# Patient Record
Sex: FEMALE | Race: WHITE | NOT HISPANIC OR LATINO | Employment: STUDENT | URBAN - METROPOLITAN AREA
[De-identification: names, ages, dates, MRNs, and addresses within clinical notes are randomized per-mention and may not be internally consistent; named-entity substitution may affect disease eponyms.]

---
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
## 2019-01-04 ENCOUNTER — OFFICE VISIT (OUTPATIENT)
Dept: OPHTHALMOLOGY | Facility: CLINIC | Age: 19
End: 2019-01-04
Attending: OPHTHALMOLOGY
Payer: COMMERCIAL

## 2019-01-04 DIAGNOSIS — Z96.1 PSEUDOPHAKIA, RIGHT EYE: ICD-10-CM

## 2019-01-04 DIAGNOSIS — M08.40 JIA (JUVENILE IDIOPATHIC ARTHRITIS), OLIGOARTHRITIS, PERSISTENT (H): ICD-10-CM

## 2019-01-04 DIAGNOSIS — H20.9 UVEITIS: ICD-10-CM

## 2019-01-04 DIAGNOSIS — H20.11 CHRONIC ANTERIOR UVEITIS OF RIGHT EYE: Primary | ICD-10-CM

## 2019-01-04 PROCEDURE — G0463 HOSPITAL OUTPT CLINIC VISIT: HCPCS | Mod: ZF

## 2019-01-04 RX ORDER — FOLIC ACID 1 MG/1
1 TABLET ORAL DAILY
Qty: 30 TABLET | Refills: 11 | Status: SHIPPED | OUTPATIENT
Start: 2019-01-04 | End: 2020-01-06

## 2019-01-04 RX ORDER — PREDNISOLONE ACETATE 10 MG/ML
1 SUSPENSION/ DROPS OPHTHALMIC 2 TIMES DAILY
Qty: 5 ML | Refills: 8 | Status: SHIPPED | OUTPATIENT
Start: 2019-01-04 | End: 2020-06-19

## 2019-01-04 ASSESSMENT — TONOMETRY
OD_IOP_MMHG: 22
IOP_METHOD: TONOPEN
OS_IOP_MMHG: 23

## 2019-01-04 ASSESSMENT — EXTERNAL EXAM - RIGHT EYE: OD_EXAM: NORMAL

## 2019-01-04 ASSESSMENT — CUP TO DISC RATIO
OD_RATIO: 0.5
OS_RATIO: 0.5

## 2019-01-04 ASSESSMENT — VISUAL ACUITY
OS_SC+: -1
OD_SC+: -1
METHOD: SNELLEN - LINEAR
OS_SC: 20/25
OD_SC: 20/20

## 2019-01-04 ASSESSMENT — CONF VISUAL FIELD
OS_NORMAL: 1
OD_NORMAL: 1

## 2019-01-04 ASSESSMENT — SLIT LAMP EXAM - LIDS
COMMENTS: NORMAL
COMMENTS: NORMAL

## 2019-01-04 ASSESSMENT — EXTERNAL EXAM - LEFT EYE: OS_EXAM: NORMAL

## 2019-01-04 NOTE — PROGRESS NOTES
HPI: Colette Marcos is a 18 year old female with a history of OMERO-associated anterior uveitis right eye here for follow up. No changes or eye concerns since last visit. No eye redness or pain. She has chronic floaters and has not noticed a change in the floaters. She has been unable to establish eye care in [place] because of difficulty with insurance coverage.    Current medications: prednisolone acetate twice a day right eye, oral methotrexate 15 mg (6 tab)/wk Saturday or Sunday (has been on and off methotrexate since age 8-9 years), folic acid 1 mg/day, Humira q2wk (started 6.7.18). Also takes meloxicam as needed for joints (not really taking).  IMT prescribed by rheum: Dr. Es Arnold at [facility]    Prior medications have included various oral NSAIDs for joints and a short course of oral prednisone for joints (patient did not tolerate well, felt sick per mother's report, patient does not recall because it was so long ago). For ease of use she switched to Humira from Remicade 700 mg (~9.5 mg/kg) q4wk (Fall 2013 - 5.7.18).    Ocular history:   1. 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 Joint disease (RF-negative, polyarticular) first diagnosed at age 5 years (though mother reports that she may have been symptomatic as early as age 2 years with ankle pain and swelling). Follows with Dr. Arnold ([facility] rheum) for joint disease and immunomodulatory therapy. Uveitis first diagnosed at age 8-9 years. Patient and mother report no history of uveitis left eye. Review of electronic records here (through 01/2014) shows no cell left eye.    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   Most recently had 0.5+ anterior chamber cell right eye in August 2017 (low trace, ~1 cell/hpf, was holding MTX at time of exam) and definite 0.5+ anterior chamber cell right eye in August 2016 (2-6 cells/hpf).  2. History of cystoid macular edema right eye, s/p intravitreal  corticosteroid right eye in September 2013 (Dr. Fofana).  3. Pseudophakia right eye s/p cataract extraction/IOL 12.09.14 ([facility]), s/p YAG capsulotomy 04.24.17.    Ocular Imaging:  Macular OCT 1.13.17: within normal limits both eyes    Impression/Plan:  1. OMERO-associated anterior uveitis right eye. Quiet today. Since most recent activity was in August 2016 (2-6 AC cells/hpf), tapering one of her medications is reasonable. Colette, Colette's mother, and I had an extensive discussion about the increased risk of Colette's uveitis flaring with a medication taper. Especially since Colette's uveitis is asymptomatic, it is critical that she have regular ophthalmology follow up during a medication taper.   - Continue methotrexate, will start tapering (12.5 mg/week for 1 month then 10 mg/week until next eye clinic visit)   - Continue Humira at q2wk   - Continue Predforte right eye twice a day   - Follow up in 2 months, over spring break. Will plan for future follow up at that visit, either here at [facility] or with an ophthalmologist near school (has had difficulty establishing care because of insurance coverage)    2. Pseudophakia right eye, stable.   - Monitor    Return to clinic in 2 months (over spring break) V/T. If there is no uveitis activity, plan to complete methotrexate taper (7.5 mg/week for 1 month then stop) with follow up in another 2 months - though may need to delay taper depending on scheduling.    Attending Physician Attestation:  Complete documentation of historical and exam elements from today's encounter can be found in the full encounter summary report (not reduplicated in this progress note).  I personally obtained the chief complaint(s) and history of present illness.  I confirmed and edited as necessary the review of systems, past medical/surgical history, family history, social history, and examination findings as documented by others; and I examined the patient myself.  I personally reviewed the relevant tests,  images, and reports as documented above.  I formulated and edited as necessary the assessment and plan and discussed the findings and management plan with the patient and family.  - Yaima Maynard M.D.

## 2019-01-04 NOTE — Clinical Note
Colette's uveitis was quiescent, so I think it is reasonable to try tapering the methotrexate. Please let me know if you do not agree with the proposed taper.Thank you, Yaima

## 2019-01-04 NOTE — PATIENT INSTRUCTIONS
Decrease methotrexate to 5 pills per week for 1 month then 4 pills per week until next eye clinic visit  Continue Predforte twice a day right eye  Continue Humira every 2 weeks

## 2019-03-14 ENCOUNTER — OFFICE VISIT (OUTPATIENT)
Dept: RHEUMATOLOGY | Facility: CLINIC | Age: 19
End: 2019-03-14
Attending: INTERNAL MEDICINE
Payer: COMMERCIAL

## 2019-03-14 VITALS
HEART RATE: 68 BPM | WEIGHT: 165.12 LBS | SYSTOLIC BLOOD PRESSURE: 130 MMHG | BODY MASS INDEX: 28.19 KG/M2 | TEMPERATURE: 98 F | HEIGHT: 64 IN | DIASTOLIC BLOOD PRESSURE: 82 MMHG

## 2019-03-14 DIAGNOSIS — H20.041 SECONDARY IRIDOCYCLITIS, NONINFECTIOUS, RIGHT: ICD-10-CM

## 2019-03-14 DIAGNOSIS — M08.40 JIA (JUVENILE IDIOPATHIC ARTHRITIS), OLIGOARTHRITIS, PERSISTENT (H): Primary | ICD-10-CM

## 2019-03-14 PROCEDURE — G0463 HOSPITAL OUTPT CLINIC VISIT: HCPCS | Mod: ZF

## 2019-03-14 ASSESSMENT — MIFFLIN-ST. JEOR: SCORE: 1513

## 2019-03-14 ASSESSMENT — PAIN SCALES - GENERAL: PAINLEVEL: NO PAIN (0)

## 2019-03-14 NOTE — PATIENT INSTRUCTIONS
[facility]    For Help:  The Pediatric Call Center at [phone] can help with scheduling of routine follow up visits.  Phil Baeza is the  for the Division of Pediatric Rheumatology and is available Monday through Friday from 7:00am to 3:30pm.  Please call Phil at [phone] to:    Schedule joint injections     Coordinate your follow up visits with other specialties or procedure for the same day    Request a call back from a nurse or your child s doctor    Request refills or lab and x-ray orders    Forward medical records    Schedule or cancel infusions (please give us 72 hours so other patients can benefit from this opening). Please try to schedule infusions 3 months in advance. Note: Insurance authorization must be obtained before any infusion can be scheduled. If you change health insurance, you must notify our office as soon as possible, so that the infusion can be reauthorized.  Amelia Don and Samantha Wynn are the Nurse Coordinators for the Division of Pediatric Rheumatology and can be reached directly at [phone]. They can help with questions about your child s rheumatic condition, medications, and test results.   For emergencies after hours or on the weekends, please call the page  at [phone] and ask to speak to the physician on-call for Pediatric Rheumatology. Please do not use [employer] for urgent requests.  Main  Services:  [phone]  o Hmong/[place]/Citizen of [place]: [phone]  o Senegalese: [phone]  [place]: [phone]

## 2019-03-14 NOTE — LETTER
3/14/2019    RE: Coletet Marcos  [address]  [facility] [address]         Rheumatology History:     Date of symptom onset:  4/1/2002  Date of first visit to center:  8/1/2005  Date of OMERO diagnosis:  8/1/2005  ILAR category:  polyarticular (RF-negative)  . 8/18/2017   WILLIAN Status Negative     . 8/18/2017   Rheumatoid Factor Status Negative         Ophthalmology History:     . 12/29/2018   (COIN) Iritis/Uveitis comorbidity? Yes     . 12/29/2018   (COIN) Active Uveitis? No     Uveitis chronicity:     Date of last eye exam: 1/4/1919  In compliance with eye screening (y/n):             Medications:   As of completion of this visit:  Current Outpatient Medications   Medication Sig Dispense Refill     adalimumab (HUMIRA PEN) 40 MG/0.8ML pen kit Inject 0.8 mLs (40 mg) Subcutaneous every 14 days 2 kit 3     folic acid (FOLVITE) 1 MG tablet Take 1 tablet (1 mg) by mouth daily 30 tablet 11     methotrexate 2.5 MG tablet Take 6 tablets (15 mg) by mouth once a week (Patient taking differently: Take 15 mg by mouth once a week Patient now taking 4 tablets=10mg once a week) 24 tablet 6     prednisoLONE acetate (PRED FORTE) 1 % ophthalmic suspension Place 1 drop into the right eye 2 times daily 5 mL 8     skin refrigerant (GEBAUERS PAIN EASE) AERO Use as directed prior to Humira injeciton. 103.5 mL 3     Prescribed medications have been administered regularly, without missed doses, and the medications have been tolerated well, without side effects.         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 Arnold at [facility]    The rheumatologist she will see while in college is:   Dr. Kelly Barney   Rheumatology   [facility]   [address]   [place] [address]   Phone: [phone]             Subjective:     Colette is a 18 year old female who was seen in Pediatric Rheumatology clinic today for follow up. Colette is accompanied today by her mom.  The primary encounter diagnosis was OMERO (juvenile idiopathic arthritis), oligoarthritis, persistent (H). A diagnosis of Secondary iridocyclitis, noninfectious, right was also pertinent to this visit. At the last visit 3 months ago, she was doing well thus we planned to taper methotrexate if Dr. Maynard agreed. She saw Dr. Maynard two months ago who agreed with tapering the methotrexate. Colette is now down to 10 mg weekly.    Since that time she has been doing well. She has not had joint pain or stiffness. She has her next eye exam tomorrow and Colette states that the plan was to stop methotrexate if the uveitis remains quiet. They are establishing care with a ophthalmologist in [place]. 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 She will be back for a short time in August and maybe May. School and swimming are going well. 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     She sprained her wrist while doing a crab walk in [place]. It is getting better.     A 14-point review of systems was negative.          Examination:   Blood pressure 130/82, pulse 68, temperature 98  F (36.7  C), temperature source Oral, height 1.632 m (5' 4.25\"), weight 74.9 kg (165 lb 2 oz), not currently breastfeeding.  90 %ile based on CDC (Girls, 2-20 Years) " weight-for-age data based on Weight recorded on 3/14/2019.  Blood pressure percentiles are not available for patients who are 18 years or older.  Gen: Pleasant, well-appearing, NAD  HEENT/Neck: TM's clear bilaterally, oropharynx is clear without lesions, neck is supple with no lymphadenopathy                  CV: Regular rate and rhythm, normal S1, S2, no murmurs  Resp: Clear to ascultation bilaterally  Abd: Soft, non-tender, non-distended, no hepatosplenomegaly  Skin: Clear, there is no rash  MSK: All joints were examined including TMJ, sternoclavicular, acromioclavicular, neck, shoulder, elbow, wrist, hips, knees, ankles, fingers, and toes, and all were normal except as follows:   JA Exam Details:  (COIN) Sacroiliac tenderness:: No  (COIN) Positive VELMA test:: No  (COIN) Modified Schober's Test:: No        Entheses  (COIN) Tender Entheses count: 0  Positive VELMA test:  No  Modified Schober s (yes/no, cm):  No    Total active joints:  0  Total limited joints:  0  Tender entheses count:  0       Imaging/ Lab Results:     None          Assessment:     Colette is a 18 year old female with 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 Colette is treated with oral methotrexate and Humira. We are mainly managing her uveitis. Two months ago her methtorexate was weaned down to 10 mg. She has a follow-up eye appointment tomorrow. If her uveitis remains quiet the plan is to stop her methotrexate. Colette, her mom, and me are all in agreement that if she is able to get off the methotrexate, we are in no hurry to get off the Humira any time soon.       Change Since Last Visit: Same  ACR Functional Class: Normal  (COIN) Provider Global Assessment Of Disease Activity: 0  (This is measured on the scale of 0 - 10)                  Plan:     1. Labs will be obtained in 1-2 months locally only if she does not stop methotrexate. She will find out tomorrow if she can stop methotrexate.  No need for labs if she gets of the methotrexate. We only do annual labs if she is on Humira only.   2. She has an eye exam tomorrow with Dr. Maynard.   3. Continue Humira.   4. Continue routine eye exams as per problem list above.   5. Return in about 5 months (around 8/14/2019). Call sooner with any concerns.     If there are any new questions or concerns, I would be glad to help and can be reached through our main office at [phone] or our paging  at [phone].    sE Arnold MD  Pediatric Rheumatology  [facility]  Patient Care Team:  Yaima Lopez MD as PCP - General (Family Practice)  Jaydon Fofana MD as MD (Ophthalmology)  Roula Matthews PT as Physical Therapist (Physical Therapy)  Es Arnold MD as MD (Pediatric Rheumatology)  Félix Anna MD (Ophthalmology)  Yaima Lopez MD as Assigned PCP  Jenny Laguerre MD as Referring Physician (Pediatrics)  Yaima Maynard MD as MD (Ophthalmology)  YAIMA LOPEZ    Copy to patient  Trisha Marcos Peter  [address] [facility] [address]

## 2019-03-14 NOTE — NURSING NOTE
"Chief Complaint   Patient presents with     Arthritis     OMERO       /82 (BP Location: Left arm, Patient Position: Sitting, Cuff Size: Adult Regular)   Pulse 68   Temp 98  F (36.7  C) (Oral)   Ht 5' 4.25\" (163.2 cm)   Wt 165 lb 2 oz (74.9 kg)   BMI 28.12 kg/m      Sheryl Guerrero CMA  March 14, 2019  "

## 2019-03-14 NOTE — PROGRESS NOTES
Rheumatology History:     Date of symptom onset:  4/1/2002  Date of first visit to center:  8/1/2005  Date of OMERO diagnosis:  8/1/2005  ILAR category:  polyarticular (RF-negative)  . 8/18/2017   WILLIAN Status Negative     . 8/18/2017   Rheumatoid Factor Status Negative         Ophthalmology History:     . 12/29/2018   (COIN) Iritis/Uveitis comorbidity? Yes     . 12/29/2018   (COIN) Active Uveitis? No     Uveitis chronicity:     Date of last eye exam: 1/4/1919  In compliance with eye screening (y/n):             Medications:   As of completion of this visit:  Current Outpatient Medications   Medication Sig Dispense Refill     adalimumab (HUMIRA PEN) 40 MG/0.8ML pen kit Inject 0.8 mLs (40 mg) Subcutaneous every 14 days 2 kit 3     folic acid (FOLVITE) 1 MG tablet Take 1 tablet (1 mg) by mouth daily 30 tablet 11     methotrexate 2.5 MG tablet Take 6 tablets (15 mg) by mouth once a week (Patient taking differently: Take 15 mg by mouth once a week Patient now taking 4 tablets=10mg once a week) 24 tablet 6     prednisoLONE acetate (PRED FORTE) 1 % ophthalmic suspension Place 1 drop into the right eye 2 times daily 5 mL 8     skin refrigerant (GEBAUERS PAIN EASE) AERO Use as directed prior to Humira injeciton. 103.5 mL 3     Prescribed medications have been administered regularly, without missed doses, and the medications have been tolerated well, without side effects.         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 Arnold at [facility]    The rheumatologist she will see while in college is:   Dr. Kelly Barney   Rheumatology   [facility]   [address]   [place] [address]   Phone: [phone]             Subjective:     Colette is a 18 year old female who was seen in Pediatric Rheumatology clinic today for follow up. Colette is accompanied today by her mom.  The primary encounter diagnosis was OMERO (juvenile idiopathic arthritis), oligoarthritis, persistent (H). A diagnosis of Secondary iridocyclitis, noninfectious, right was also pertinent to this visit. At the last visit 3 months ago, she was doing well thus we planned to taper methotrexate if Dr. Maynard agreed. She saw Dr. Maynard two months ago who agreed with tapering the methotrexate. Colette is now down to 10 mg weekly.    Since that time she has been doing well. She has not had joint pain or stiffness. She has her next eye exam tomorrow and Colette states that the plan was to stop methotrexate if the uveitis remains quiet. They are establishing care with a ophthalmologist in [place]. 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 She will be back for a short time in August and maybe May. School and swimming are going well. 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     She sprained her wrist while doing a crab walk in [place]. It is getting better.     A 14-point review of systems was negative.          Examination:   Blood pressure 130/82, pulse 68, temperature 98  F (36.7  C), temperature source Oral, height 1.632 m (5' 4.25\"), weight 74.9 kg (165 lb 2 oz), not currently breastfeeding.  90 %ile based on CDC (Girls, 2-20 Years) weight-for-age data based on Weight recorded on 3/14/2019.  Blood pressure percentiles are not " available for patients who are 18 years or older.  Gen: Pleasant, well-appearing, NAD  HEENT/Neck: TM's clear bilaterally, oropharynx is clear without lesions, neck is supple with no lymphadenopathy                  CV: Regular rate and rhythm, normal S1, S2, no murmurs  Resp: Clear to ascultation bilaterally  Abd: Soft, non-tender, non-distended, no hepatosplenomegaly  Skin: Clear, there is no rash  MSK: All joints were examined including TMJ, sternoclavicular, acromioclavicular, neck, shoulder, elbow, wrist, hips, knees, ankles, fingers, and toes, and all were normal except as follows:   JA Exam Details:  (COIN) Sacroiliac tenderness:: No  (COIN) Positive VELMA test:: No  (COIN) Modified Schober's Test:: No        Entheses  (COIN) Tender Entheses count: 0  Positive VELMA test:  No  Modified Schober s (yes/no, cm):  No    Total active joints:  0  Total limited joints:  0  Tender entheses count:  0       Imaging/ Lab Results:     None          Assessment:     Colette is a 18 year old female with 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 Colette is treated with oral methotrexate and Humira. We are mainly managing her uveitis. Two months ago her methtorexate was weaned down to 10 mg. She has a follow-up eye appointment tomorrow. If her uveitis remains quiet the plan is to stop her methotrexate. Colette, her mom, and me are all in agreement that if she is able to get off the methotrexate, we are in no hurry to get off the Humira any time soon.       Change Since Last Visit: Same  ACR Functional Class: Normal  (COIN) Provider Global Assessment Of Disease Activity: 0  (This is measured on the scale of 0 - 10)                  Plan:     1. Labs will be obtained in 1-2 months locally only if she does not stop methotrexate. She will find out tomorrow if she can stop methotrexate. No need for labs if she gets of the methotrexate. We only do annual labs if she is on Humira  only.   2. She has an eye exam tomorrow with Dr. Maynard.   3. Continue Humira.   4. Continue routine eye exams as per problem list above.   5. Return in about 5 months (around 8/14/2019). Call sooner with any concerns.     If there are any new questions or concerns, I would be glad to help and can be reached through our main office at [phone] or our paging  at [phone].    Es Arnold MD  Pediatric Rheumatology  [facility]  Patient Care Team:  Yaima Lopez MD as PCP - General (Family Practice)  Jaydon Fofana MD as MD (Ophthalmology)  Roula Matthews PT as Physical Therapist (Physical Therapy)  Es Arnold MD as MD (Pediatric Rheumatology)  Félix Anna MD (Ophthalmology)  Yaima Lopez MD as Assigned PCP  Jenny Laguerre MD as Referring Physician (Pediatrics)  Yaima Maynard MD as MD (Ophthalmology)  YAIMA LOPEZ    Copy to patient  Trisha Marcos Peter  [address] [facility] [address]

## 2019-03-15 ENCOUNTER — OFFICE VISIT (OUTPATIENT)
Dept: OPHTHALMOLOGY | Facility: CLINIC | Age: 19
End: 2019-03-15
Attending: OPHTHALMOLOGY
Payer: COMMERCIAL

## 2019-03-15 DIAGNOSIS — Z96.1 PSEUDOPHAKIA, RIGHT EYE: ICD-10-CM

## 2019-03-15 DIAGNOSIS — H20.11 CHRONIC ANTERIOR UVEITIS OF RIGHT EYE: Primary | ICD-10-CM

## 2019-03-15 PROCEDURE — G0463 HOSPITAL OUTPT CLINIC VISIT: HCPCS | Mod: ZF

## 2019-03-15 ASSESSMENT — TONOMETRY
OS_IOP_MMHG: 21
IOP_METHOD: TONOPEN
OD_IOP_MMHG: 21
OS_IOP_MMHG: 20
IOP_METHOD: ICARE
OD_IOP_MMHG: 24

## 2019-03-15 ASSESSMENT — VISUAL ACUITY
OD_SC: 20/25
METHOD: SNELLEN - LINEAR
OS_SC: 20/20

## 2019-03-15 ASSESSMENT — SLIT LAMP EXAM - LIDS
COMMENTS: NORMAL
COMMENTS: NORMAL

## 2019-03-15 ASSESSMENT — EXTERNAL EXAM - LEFT EYE: OS_EXAM: NORMAL

## 2019-03-15 ASSESSMENT — CUP TO DISC RATIO
OD_RATIO: 0.5
OS_RATIO: 0.5

## 2019-03-15 ASSESSMENT — EXTERNAL EXAM - RIGHT EYE: OD_EXAM: NORMAL

## 2019-03-15 NOTE — NURSING NOTE
Patient presents for 2mo f/u of Chronic anterior uveitis of right eye (Primary Dx); Uveitis. Since the last visit the vision has been stable. No pain or discomfort, no redness itching or tears. No f/f. Consistent w/PF eye drops. H/o Secondary iridocyclitis, noninfectious, right, Pseudophakia of right eye & Posterior capsular opacification visually significant, right. Jasmin Fowler COT 11:46 AM March 15, 2019

## 2019-03-15 NOTE — PROGRESS NOTES
HPI: Colette Marcos is a 18 year old female with a history of OMERO-associated anterior uveitis right eye here for follow up. No changes or eye concerns since last visit. No eye redness or pain. She has chronic floaters and has not noticed a change in the floaters. Since last visit, Colette has slowly tapered methotrexate as directed (was at 15 mg/wk, then 12.5 mg/wk Jan 2019, then 10 mg/wk since Feb 2019). Denies any change in symptoms with methotrexate taper. Also still on pred forte 2x/day right eye.    Current medications: prednisolone acetate twice a day right eye, oral methotrexate 10 mg (4 tab)/wk Saturday or Sunday (has been on and off methotrexate since age 8-9 years, previously on 15 mg/wk with taper starting early Jan 2019), folic acid 1 mg/day, Humira q2wk (started 6.7.18). Also takes meloxicam as needed for joints (not really taking).  IMT prescribed by rheum: Dr. Es Arnold at [facility]    Prior medications have included various oral NSAIDs for joints and a short course of oral prednisone for joints (patient did not tolerate well, felt sick per mother's report, patient does not recall because it was so long ago). For ease of use she switched to Humira from Remicade 700 mg (~9.5 mg/kg) q4wk (Fall 2013 - 5.7.18).    Ocular history:   1. 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 Joint disease (RF-negative, polyarticular) first diagnosed at age 5 years (though mother reports that she may have been symptomatic as early as age 2 years with ankle pain and swelling). Follows with Dr. Arnold ([facility] rheum) for joint disease and immunomodulatory therapy. Uveitis first diagnosed at age 8-9 years. Patient and mother report no history of uveitis left eye. Review of electronic records here (through 01/2014) shows no cell left eye.    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   Most recently had 0.5+ anterior chamber cell right eye in August 2017 (low trace,  ~1 cell/hpf, was holding MTX at time of exam) and definite 0.5+ anterior chamber cell right eye in August 2016 (2-6 cells/hpf).  2. History of cystoid macular edema right eye, s/p intravitreal corticosteroid right eye in September 2013 (Dr. Fofana).  3. Pseudophakia right eye s/p cataract extraction/IOL 12.09.14 ([facility]), s/p YAG capsulotomy 04.24.17.    Ocular Imaging:  Macular OCT 1.13.17: within normal limits both eyes    Impression/Plan:  1. OMERO-associated anterior uveitis right eye. There is new low grade 0.5+ AC cell right eye today with tapering of methotrexate.   - Continue methotrexate at 10 mg/week until next eye clinic visit. I do not recommend further taper at this time   - Continue Humira at q2wk   - Continue Predforte right eye twice a day   - Follow up in 2 months, over spring break. Will plan for future follow up at that visit, either here at [facility] or with an ophthalmologist near school (has had difficulty establishing care because of insurance coverage)    2. Pseudophakia right eye, stable.   - Monitor    Return to clinic in 2 months (over spring break) V/T. If there is no uveitis activity, plan to complete methotrexate taper (7.5 mg/week for 1 month then stop) with follow up in another 2 months - though may need to delay taper depending on scheduling.    Attending Physician Attestation:  Complete documentation of historical and exam elements from today's encounter can be found in the full encounter summary report (not reduplicated in this progress note).  I personally obtained the chief complaint(s) and history of present illness.  I confirmed and edited as necessary the review of systems, past medical/surgical history, family history, social history, and examination findings as documented by others; and I examined the patient myself.  I personally reviewed the relevant tests, images, and reports as documented above.  I formulated and edited as necessary the assessment and plan and discussed the  findings and management plan with the patient and family.  - Yaima Maynard M.D.

## 2019-03-15 NOTE — Clinical Note
I recommend continuing methotrexate for now at 10 mg/wk since there was just a hint of anterior uveitis right eye today (low grade 0.5+ AC cell), with the plan to reevaluate in May.Thanks, Yaima

## 2019-05-15 ENCOUNTER — TELEPHONE (OUTPATIENT)
Dept: RHEUMATOLOGY | Facility: CLINIC | Age: 19
End: 2019-05-15

## 2019-05-15 NOTE — TELEPHONE ENCOUNTER
Prior Authorization Approval    Authorization Effective Date: 4/14/2019  Authorization Expiration Date: 5/13/2021  Medication: Humira- Approved  Approved Dose/Quantity: 40mg/ 2  Reference #:     Insurance Company: [employer] - Phone [phone] Fax [phone]  Expected CoPay:       CoPay Card Available:      Foundation Assistance Needed:    Which Pharmacy is filling the prescription (Not needed for infusion/clinic administered): [facility] - [place], [place]  [facility] [address]  Pharmacy Notified: Yes  Patient Notified: Yes

## 2019-05-24 ENCOUNTER — OFFICE VISIT (OUTPATIENT)
Dept: OPHTHALMOLOGY | Facility: CLINIC | Age: 19
End: 2019-05-24
Attending: OPHTHALMOLOGY
Payer: COMMERCIAL

## 2019-05-24 DIAGNOSIS — Z96.1 PSEUDOPHAKIA, RIGHT EYE: ICD-10-CM

## 2019-05-24 DIAGNOSIS — Z79.899 MEDICATION MANAGEMENT: ICD-10-CM

## 2019-05-24 DIAGNOSIS — H20.11 CHRONIC ANTERIOR UVEITIS OF RIGHT EYE: Primary | ICD-10-CM

## 2019-05-24 PROCEDURE — G0463 HOSPITAL OUTPT CLINIC VISIT: HCPCS | Mod: ZF

## 2019-05-24 ASSESSMENT — VISUAL ACUITY
OD_PH_SC: 20/30
METHOD: SNELLEN - LINEAR
OD_PH_SC+: -1
OD_SC+: -2
OS_PH_SC+: -1
OS_SC: 20/30
OD_SC: 20/40
OS_PH_SC: 20/20

## 2019-05-24 ASSESSMENT — CONF VISUAL FIELD
METHOD: COUNTING FINGERS
OS_NORMAL: 1
OD_NORMAL: 1

## 2019-05-24 ASSESSMENT — TONOMETRY
OS_IOP_MMHG: 18
IOP_METHOD: APPLANATION
OS_IOP_MMHG: 18
OD_IOP_MMHG: 21
OD_IOP_MMHG: 24
IOP_METHOD: ICARE

## 2019-05-24 ASSESSMENT — EXTERNAL EXAM - LEFT EYE: OS_EXAM: NORMAL

## 2019-05-24 ASSESSMENT — SLIT LAMP EXAM - LIDS
COMMENTS: NORMAL
COMMENTS: NORMAL

## 2019-05-24 ASSESSMENT — EXTERNAL EXAM - RIGHT EYE: OD_EXAM: NORMAL

## 2019-05-24 ASSESSMENT — CUP TO DISC RATIO
OD_RATIO: 0.5
OS_RATIO: 0.5

## 2019-05-24 NOTE — Clinical Note
Colette's eyes were doing well, so I think she can try stopping the methotrexate and seeing me within a month of stopping. 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 Does this plan seem ok to you? Thanks, Yaima

## 2019-05-24 NOTE — PROGRESS NOTES
HPI: Colette Marcos is a 19 year old female with a history of OMERO-associated anterior uveitis right eye here for follow up. No changes or eye concerns since last visit. No eye redness or pain. She has chronic floaters and has not noticed a change in the floaters.    Current medications: prednisolone acetate twice a day right eye, oral methotrexate 10 mg (4 tab)/wk Saturday or Sunday (has been on and off methotrexate since age 8-9 years, previously on 15 mg/wk with taper starting early Jan 2019), folic acid 1 mg/day, Humira q2wk (started 6.7.18). Also takes meloxicam as needed for joints (not really taking).  IMT prescribed by rheum: Dr. Es Arnold at [facility]    Prior medications have included various oral NSAIDs for joints and a short course of oral prednisone for joints (patient did not tolerate well, felt sick per mother's report, patient does not recall because it was so long ago). For ease of use she switched to Humira from Remicade 700 mg (~9.5 mg/kg) q4wk (Fall 2013 - 5.7.18).    Ocular history:   1. 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 Joint disease (RF-negative, polyarticular) first diagnosed at age 5 years (though mother reports that she may have been symptomatic as early as age 2 years with ankle pain and swelling). Follows with Dr. Arnold ([facility] rheum) for joint disease and immunomodulatory therapy. Uveitis first diagnosed at age 8-9 years. Patient and mother report no history of uveitis left eye. Review of electronic records here (through 01/2014) shows no cell left eye.    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   Most recently had 0.5+ anterior chamber cell right eye in August 2017 (low trace, ~1 cell/hpf, was holding MTX at time of exam) and definite 0.5+ anterior chamber cell right eye in August 2016 (2-6 cells/hpf).  2. History of cystoid macular edema right eye, s/p intravitreal corticosteroid right eye in September 2013 (  [facility]).  3. Pseudophakia right eye s/p cataract extraction/IOL 12.09.14 ([facility]), s/p YAG capsulotomy 04.24.17.    Ocular Imaging:  Macular OCT 1.13.17: within normal limits both eyes    Impression/Plan:  1. OMERO-associated anterior uveitis right eye. There is rare AC cell right eye (not gradable level and improved from last visit), so will have Colette proceed with trial off methotrexate; she should be seen within a month of stopping methotrexate so will plan for her to stop in July.   - Continue methotrexate at 10 mg/week for now, then stop 1 month prior to next eye clinic visit   - Discussed increased risk of uveitis flare with stopping methotrexate   - Continue Humira at q2wk - Colette is due for safety labs, so ordered today - she will have these done at a [place] closer to her home   - Continue Predforte right eye twice a day   - Follow up in 3 months, V/T    2. Pseudophakia right eye, stable.   - Monitor    Attending Physician Attestation:  Complete documentation of historical and exam elements from today's encounter can be found in the full encounter summary report (not reduplicated in this progress note).  I personally obtained the chief complaint(s) and history of present illness.  I confirmed and edited as necessary the review of systems, past medical/surgical history, family history, social history, and examination findings as documented by others; and I examined the patient myself.  I personally reviewed the relevant tests, images, and reports as documented above.  I formulated and edited as necessary the assessment and plan and discussed the findings and management plan with the patient and family.  - Yaima Maynard M.D.

## 2019-05-24 NOTE — NURSING NOTE
Chief Complaint(s) and History of Present Illness(es)     Follow Up     In right eye.  Associated symptoms include Negative for dryness, eye pain, redness and tearing.              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 Pt notes no changes in vision each eye x 2 months.     SOLITARIO Lui 3:01 PM May 24, 2019

## 2019-05-29 DIAGNOSIS — H20.11 CHRONIC ANTERIOR UVEITIS OF RIGHT EYE: ICD-10-CM

## 2019-05-29 DIAGNOSIS — Z79.899 MEDICATION MANAGEMENT: ICD-10-CM

## 2019-05-29 LAB
ALBUMIN UR-MCNC: NEGATIVE MG/DL
APPEARANCE UR: CLEAR
BASOPHILS # BLD AUTO: 0 10E9/L (ref 0–0.2)
BASOPHILS NFR BLD AUTO: 0.4 %
BILIRUB UR QL STRIP: NEGATIVE
COLOR UR AUTO: YELLOW
CRP SERPL-MCNC: <2.9 MG/L (ref 0–8)
DIFFERENTIAL METHOD BLD: NORMAL
EOSINOPHIL # BLD AUTO: 0.4 10E9/L (ref 0–0.7)
EOSINOPHIL NFR BLD AUTO: 3.9 %
ERYTHROCYTE [DISTWIDTH] IN BLOOD BY AUTOMATED COUNT: 13 % (ref 10–15)
ERYTHROCYTE [SEDIMENTATION RATE] IN BLOOD BY WESTERGREN METHOD: 9 MM/H (ref 0–20)
GLUCOSE UR STRIP-MCNC: NEGATIVE MG/DL
HCT VFR BLD AUTO: 39.6 % (ref 35–47)
HGB BLD-MCNC: 13.3 G/DL (ref 11.7–15.7)
HGB UR QL STRIP: NEGATIVE
KETONES UR STRIP-MCNC: ABNORMAL MG/DL
LEUKOCYTE ESTERASE UR QL STRIP: NEGATIVE
LYMPHOCYTES # BLD AUTO: 2.1 10E9/L (ref 0.8–5.3)
LYMPHOCYTES NFR BLD AUTO: 22.3 %
MCH RBC QN AUTO: 30.5 PG (ref 26.5–33)
MCHC RBC AUTO-ENTMCNC: 33.6 G/DL (ref 31.5–36.5)
MCV RBC AUTO: 91 FL (ref 78–100)
MONOCYTES # BLD AUTO: 0.8 10E9/L (ref 0–1.3)
MONOCYTES NFR BLD AUTO: 8.5 %
NEUTROPHILS # BLD AUTO: 6 10E9/L (ref 1.6–8.3)
NEUTROPHILS NFR BLD AUTO: 64.9 %
NITRATE UR QL: NEGATIVE
PH UR STRIP: 7 PH (ref 5–7)
PLATELET # BLD AUTO: 294 10E9/L (ref 150–450)
RBC # BLD AUTO: 4.36 10E12/L (ref 3.8–5.2)
RBC #/AREA URNS AUTO: ABNORMAL /HPF
SOURCE: ABNORMAL
SP GR UR STRIP: 1.02 (ref 1–1.03)
UROBILINOGEN UR STRIP-ACNC: 0.2 EU/DL (ref 0.2–1)
WBC # BLD AUTO: 9.2 10E9/L (ref 4–11)
WBC #/AREA URNS AUTO: ABNORMAL /HPF

## 2019-05-29 PROCEDURE — 36415 COLL VENOUS BLD VENIPUNCTURE: CPT | Performed by: OPHTHALMOLOGY

## 2019-05-29 PROCEDURE — 82565 ASSAY OF CREATININE: CPT | Performed by: OPHTHALMOLOGY

## 2019-05-29 PROCEDURE — 86140 C-REACTIVE PROTEIN: CPT | Performed by: OPHTHALMOLOGY

## 2019-05-29 PROCEDURE — 85652 RBC SED RATE AUTOMATED: CPT | Performed by: OPHTHALMOLOGY

## 2019-05-29 PROCEDURE — 80076 HEPATIC FUNCTION PANEL: CPT | Performed by: OPHTHALMOLOGY

## 2019-05-29 PROCEDURE — 85025 COMPLETE CBC W/AUTO DIFF WBC: CPT | Performed by: OPHTHALMOLOGY

## 2019-05-29 PROCEDURE — 81001 URINALYSIS AUTO W/SCOPE: CPT | Performed by: OPHTHALMOLOGY

## 2019-05-30 LAB
ALBUMIN SERPL-MCNC: 3.9 G/DL (ref 3.4–5)
ALP SERPL-CCNC: 61 U/L (ref 40–150)
ALT SERPL W P-5'-P-CCNC: 29 U/L (ref 0–50)
AST SERPL W P-5'-P-CCNC: 23 U/L (ref 0–45)
BILIRUB DIRECT SERPL-MCNC: <0.1 MG/DL (ref 0–0.2)
BILIRUB SERPL-MCNC: 0.2 MG/DL (ref 0.2–1.3)
CREAT SERPL-MCNC: 0.78 MG/DL (ref 0.52–1.04)
GFR SERPL CREATININE-BSD FRML MDRD: >90 ML/MIN/{1.73_M2}
PROT SERPL-MCNC: 7.6 G/DL (ref 6.8–8.8)

## 2019-06-11 ENCOUNTER — TELEPHONE (OUTPATIENT)
Dept: RHEUMATOLOGY | Facility: CLINIC | Age: 19
End: 2019-06-11

## 2019-06-11 DIAGNOSIS — H20.9 UVEITIS: ICD-10-CM

## 2019-06-11 DIAGNOSIS — M08.40 JIA (JUVENILE IDIOPATHIC ARTHRITIS), OLIGOARTHRITIS, PERSISTENT (H): ICD-10-CM

## 2019-08-16 ENCOUNTER — OFFICE VISIT (OUTPATIENT)
Dept: OPHTHALMOLOGY | Facility: CLINIC | Age: 19
End: 2019-08-16
Attending: OPHTHALMOLOGY
Payer: COMMERCIAL

## 2019-08-16 DIAGNOSIS — H20.11 CHRONIC ANTERIOR UVEITIS OF RIGHT EYE: Primary | ICD-10-CM

## 2019-08-16 DIAGNOSIS — Z96.1 PSEUDOPHAKIA, RIGHT EYE: ICD-10-CM

## 2019-08-16 PROCEDURE — G0463 HOSPITAL OUTPT CLINIC VISIT: HCPCS | Mod: ZF

## 2019-08-16 ASSESSMENT — CONF VISUAL FIELD
OS_NORMAL: 1
OD_NORMAL: 1
METHOD: COUNTING FINGERS

## 2019-08-16 ASSESSMENT — TONOMETRY
OS_IOP_MMHG: 22
IOP_METHOD: APPLANATION
OD_IOP_MMHG: 22

## 2019-08-16 ASSESSMENT — SLIT LAMP EXAM - LIDS
COMMENTS: NORMAL
COMMENTS: NORMAL

## 2019-08-16 ASSESSMENT — VISUAL ACUITY
OD_PH_SC+: -2
OS_SC+: -2
OS_SC: 20/30
CORRECTION_TYPE: GLASSES
OD_SC: 20/40
OD_PH_SC: 20/25
METHOD: SNELLEN - LINEAR
OS_PH_SC: 20/20

## 2019-08-16 ASSESSMENT — CUP TO DISC RATIO
OD_RATIO: 0.5
OS_RATIO: 0.5

## 2019-08-16 ASSESSMENT — EXTERNAL EXAM - LEFT EYE: OS_EXAM: NORMAL

## 2019-08-16 ASSESSMENT — EXTERNAL EXAM - RIGHT EYE: OD_EXAM: NORMAL

## 2019-08-16 NOTE — NURSING NOTE
Chief Complaints and History of Present Illnesses   Patient presents with     Uveitis Follow-Up     Chief Complaint(s) and History of Present Illness(es)     Uveitis Follow-Up     Laterality: right eye    Course: stable    Associated symptoms: dryness.  Negative for eye pain, redness and tearing    Pain scale: 0/10              Comments     Chronic anterior uveitis of RE.  Pt denies pain or VA changes.  Prednisolone BID to RE.  Denies use of art tears.  Kathya Almonte COT 1:18 PM 08/16/2019

## 2019-08-16 NOTE — PROGRESS NOTES
HPI: Colette Marcos is a 19 year old female with a history of OMERO-associated anterior uveitis right eye here for follow up. No changes or eye concerns since last visit. No eye redness or pain. She has chronic floaters and has not noticed a change in the floaters.    Current medications: prednisolone acetate twice a day right eye, Humira q2wk (started 6.7.18). Also takes meloxicam as needed for joints (not really taking).  IMT prescribed by rheum: Dr. Es Arnold at [facility]    Prior medications have included various oral NSAIDs for joints and a short course of oral prednisone for joints (patient did not tolerate well, felt sick per mother's report, patient does not recall because it was so long ago). For ease of use she switched to Humira from Remicade 700 mg (~9.5 mg/kg) q4wk (Fall 2013 - 5.7.18), oral methotrexate 10 mg (was on and off methotrexate since age 8-9 years, previously on 15 mg/wk with taper starting early Jan 2019 - stopped July 20, 2019).    Ocular history:   1. 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 Joint disease (RF-negative, polyarticular) first diagnosed at age 5 years (though mother reports that she may have been symptomatic as early as age 2 years with ankle pain and swelling). Follows with Dr. Arnold ([facility] rheum) for joint disease and immunomodulatory therapy. Uveitis first diagnosed at age 8-9 years. Patient and mother report no history of uveitis left eye. Review of electronic records here (through 01/2014) shows no cell left eye.    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   Most recently had 0.5+ anterior chamber cell right eye in August 2017 (low trace, ~1 cell/hpf, was holding MTX at time of exam) and definite 0.5+ anterior chamber cell right eye in August 2016 (2-6 cells/hpf).  2. History of cystoid macular edema right eye, s/p intravitreal corticosteroid right eye in September 2013 (Dr. Fofana).  3. Pseudophakia  right eye s/p cataract extraction/IOL 12.09.14 ([facility]), s/p YAG capsulotomy 04.24.17.    Ocular Imaging:  Macular OCT 1.13.17: within normal limits both eyes    Impression/Plan:  1. OMERO-associated anterior uveitis right eye. There is rare AC cell right eye (not gradable level and stable from last visit), so will have Colette continue off methotrexate.   - Continue Humira at q2wk   - Continue Predforte right eye twice a day, may increase if needed for additional uveitis control   - Follow up in 2 months near [facility] and in 4 months here, V/T    2. Pseudophakia right eye, stable.   - Monitor    Attending Physician Attestation:  Complete documentation of historical and exam elements from today's encounter can be found in the full encounter summary report (not reduplicated in this progress note).  I personally obtained the chief complaint(s) and history of present illness.  I confirmed and edited as necessary the review of systems, past medical/surgical history, family history, social history, and examination findings as documented by others; and I examined the patient myself.  I personally reviewed the relevant tests, images, and reports as documented above.  I formulated and edited as necessary the assessment and plan and discussed the findings and management plan with the patient and family.  - Yaima Maynard M.D.

## 2019-08-22 ENCOUNTER — OFFICE VISIT (OUTPATIENT)
Dept: RHEUMATOLOGY | Facility: CLINIC | Age: 19
End: 2019-08-22
Attending: INTERNAL MEDICINE
Payer: COMMERCIAL

## 2019-08-22 VITALS
WEIGHT: 171.3 LBS | HEART RATE: 60 BPM | DIASTOLIC BLOOD PRESSURE: 73 MMHG | TEMPERATURE: 98 F | BODY MASS INDEX: 29.24 KG/M2 | HEIGHT: 64 IN | SYSTOLIC BLOOD PRESSURE: 119 MMHG

## 2019-08-22 DIAGNOSIS — H20.041 SECONDARY IRIDOCYCLITIS, NONINFECTIOUS, RIGHT: ICD-10-CM

## 2019-08-22 DIAGNOSIS — M08.40 JIA (JUVENILE IDIOPATHIC ARTHRITIS), OLIGOARTHRITIS, PERSISTENT (H): Primary | ICD-10-CM

## 2019-08-22 PROCEDURE — G0463 HOSPITAL OUTPT CLINIC VISIT: HCPCS | Mod: ZF

## 2019-08-22 ASSESSMENT — MIFFLIN-ST. JEOR: SCORE: 1536

## 2019-08-22 ASSESSMENT — PAIN SCALES - GENERAL: PAINLEVEL: NO PAIN (0)

## 2019-08-22 NOTE — PROGRESS NOTES
Rheumatology History:     Date of symptom onset:  4/1/2002  Date of first visit to center:  8/1/2005  Date of OMERO diagnosis:  8/1/2005  ILAR category:  polyarticular (RF-negative)  . 8/18/2017   WILLIAN Status Negative     . 8/18/2017   Rheumatoid Factor Status Negative         Ophthalmology History:     . 12/29/2018   (COIN) Iritis/Uveitis comorbidity? Yes     . 12/29/2018   (COIN) Active Uveitis? No     Date of last eye exam: 1/4/1919  In compliance with eye screening (y/n):             Medications:   As of completion of this visit:  Current Outpatient Medications   Medication Sig Dispense Refill     adalimumab (HUMIRA PEN) 40 MG/0.8ML pen kit Inject 0.8 mLs (40 mg) Subcutaneous every 14 days 2 kit 3     folic acid (FOLVITE) 1 MG tablet Take 1 tablet (1 mg) by mouth daily 30 tablet 11     prednisoLONE acetate (PRED FORTE) 1 % ophthalmic suspension Place 1 drop into the right eye 2 times daily 5 mL 8     skin refrigerant (GEBAUERS PAIN EASE) AERO Use as directed prior to Humira injeciton. 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 Arnold at [facility]    The rheumatologist she will see while in college is:   Dr. Kelly Barney   Rheumatology   [facility]   [address]   [place],  "[place] [address]   Phone: [phone]             Subjective:     Colette is a 19 year old female who was seen in Pediatric Rheumatology clinic today for follow up. Colette is unaccompanied today.  The primary encounter diagnosis was OMERO (juvenile idiopathic arthritis), oligoarthritis, persistent (H). A diagnosis of Secondary iridocyclitis, noninfectious, right was also pertinent to this visit. At the last visit 5 months ago, she was doing well thus we planned to stop the methotrexate if her upcoming eye exam was stable. Since that time she has been doing well. She's been off methotrexate for one month. She will see ophthalmology in October near school then she will see Dr. Maynard again in December.     A 14-point review of systems was negative.     Information per our standardized questionnaire is as below:   Self Report  (COIN) Patient Pain Status: 0  (COIN) Patient Global Assessment Of Disease Activity: 0  Score Reported By: Self  Arthritis History  (COIN) Morning stiffness in the past week: no stiffness  Has your arthritis stopped from trying any athletic or rigorous activities, or interfaced with your ability to do these activities: No  Have you been limited your ability to do normal daily activities in the past week: No  Did you needed help from other people to do normal activities in the past week: No  Have you used any aids or devices to help you do normal daily activities in the past week: No            Examination:   Blood pressure 119/73, pulse 60, temperature 98  F (36.7  C), temperature source Oral, height 1.632 m (5' 4.25\"), weight 77.7 kg (171 lb 4.8 oz), not currently breastfeeding.  Normalized weight-for-age data not available for patients older than 20 years.  Blood pressure percentiles are not available for patients who are 18 years or older.  Gen: Pleasant, well-appearing, NAD  HEENT/Neck: TM's clear bilaterally, oropharynx is clear without lesions, neck is supple with no lymphadenopathy                "   CV: Regular rate and rhythm, normal S1, S2, no murmurs  Resp: Clear to ascultation bilaterally  Abd: Soft, non-tender, non-distended, no hepatosplenomegaly  Skin: Clear, there is no rash  MSK: All joints were examined including TMJ, sternoclavicular, acromioclavicular, neck, shoulder, elbow, wrist, hips, knees, ankles, fingers, and toes, and all were normal except as follows:   JA Exam Details:  (COIN) Sacroiliac tenderness:: No  (COIN) Positive VELMA test:: No  (COIN) Modified Schober's Test:: No        Entheses  (COIN) Tender Entheses count: 0  Positive VELMA test:  No  Modified Schober s (yes/no, cm):  No    Total active joints:  0  Total limited joints:  0  Tender entheses count:  0       Imaging/ Lab Results:     No visits with results within 1 Day(s) from this visit.   Latest known visit with results is:   Orders Only on 05/29/2019   Component Date Value Ref Range Status     Creatinine 05/29/2019 0.78  0.52 - 1.04 mg/dL Final     GFR Estimate 05/29/2019 >90  >60 mL/min/[1.73_m2] Final    Comment: Non  GFR Calc  Starting 12/18/2018, serum creatinine based estimated GFR (eGFR) will be   calculated using the Chronic Kidney Disease Epidemiology Collaboration   (CKD-EPI) equation.       GFR Estimate If Black 05/29/2019 >90  >60 mL/min/[1.73_m2] Final    Comment:  GFR Calc  Starting 12/18/2018, serum creatinine based estimated GFR (eGFR) will be   calculated using the Chronic Kidney Disease Epidemiology Collaboration   (CKD-EPI) equation.       Sed Rate 05/29/2019 9  0 - 20 mm/h Final     Bilirubin Direct 05/29/2019 <0.1  0.0 - 0.2 mg/dL Final     Bilirubin Total 05/29/2019 0.2  0.2 - 1.3 mg/dL Final     Albumin 05/29/2019 3.9  3.4 - 5.0 g/dL Final     Protein Total 05/29/2019 7.6  6.8 - 8.8 g/dL Final     Alkaline Phosphatase 05/29/2019 61  40 - 150 U/L Final     ALT 05/29/2019 29  0 - 50 U/L Final     AST 05/29/2019 23  0 - 45 U/L Final     CRP Inflammation 05/29/2019 <2.9  0.0 -  8.0 mg/L Final     WBC 05/29/2019 9.2  4.0 - 11.0 10e9/L Final     RBC Count 05/29/2019 4.36  3.8 - 5.2 10e12/L Final     Hemoglobin 05/29/2019 13.3  11.7 - 15.7 g/dL Final     Hematocrit 05/29/2019 39.6  35.0 - 47.0 % Final     MCV 05/29/2019 91  78 - 100 fl Final     MCH 05/29/2019 30.5  26.5 - 33.0 pg Final     MCHC 05/29/2019 33.6  31.5 - 36.5 g/dL Final     RDW 05/29/2019 13.0  10.0 - 15.0 % Final     Platelet Count 05/29/2019 294  150 - 450 10e9/L Final     % Neutrophils 05/29/2019 64.9  % Final     % Lymphocytes 05/29/2019 22.3  % Final     % Monocytes 05/29/2019 8.5  % Final     % Eosinophils 05/29/2019 3.9  % Final     % Basophils 05/29/2019 0.4  % Final     Absolute Neutrophil 05/29/2019 6.0  1.6 - 8.3 10e9/L Final     Absolute Lymphocytes 05/29/2019 2.1  0.8 - 5.3 10e9/L Final     Absolute Monocytes 05/29/2019 0.8  0.0 - 1.3 10e9/L Final     Absolute Eosinophils 05/29/2019 0.4  0.0 - 0.7 10e9/L Final     Absolute Basophils 05/29/2019 0.0  0.0 - 0.2 10e9/L Final     Diff Method 05/29/2019 Automated Method   Final     Color Urine 05/29/2019 Yellow   Final     Appearance Urine 05/29/2019 Clear   Final     Glucose Urine 05/29/2019 Negative  NEG^Negative mg/dL Final     Bilirubin Urine 05/29/2019 Negative  NEG^Negative Final     Ketones Urine 05/29/2019 Trace* NEG^Negative mg/dL Final     Specific Gravity Urine 05/29/2019 1.020  1.003 - 1.035 Final     pH Urine 05/29/2019 7.0  5.0 - 7.0 pH Final     Protein Albumin Urine 05/29/2019 Negative  NEG^Negative mg/dL Final     Urobilinogen Urine 05/29/2019 0.2  0.2 - 1.0 EU/dL Final     Nitrite Urine 05/29/2019 Negative  NEG^Negative Final     Blood Urine 05/29/2019 Negative  NEG^Negative Final     Leukocyte Esterase Urine 05/29/2019 Negative  NEG^Negative Final     Source 05/29/2019 Midstream Urine   Final     WBC Urine 05/29/2019 0 - 5  OTO5^0 - 5 /HPF Final     RBC Urine 05/29/2019 O - 2  OTO2^O - 2 /HPF Final          Assessment:     Colette is a 19 year old female  with 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 Colette is treated with Humira. She stopped her methotrexate ~5 months ago. Her OMERO and uveitis remain under good control. We've decided at this point to continue Humira monotherapy for the time being.          Plan:     1. Continue current therapies.   2. Continue routine eye exams per ophthalmology.  3. Return in about 4 months (around 12/22/2019). Call sooner with any concerns.     If there are any new questions or concerns, I would be glad to help and can be reached through our main office at [phone] or our paging  at [phone].    Es Arnold MD  Pediatric Rheumatology  [facility]          CC  Patient Care Team:  Yaima Lopez MD as PCP - General (Family Practice)  Jaydon Fofana MD as MD (Ophthalmology)  Roula Matthews PT as Physical Therapist (Physical Therapy)  Es Arnold MD as MD (Pediatric Rheumatology)  Félix Anna MD (Ophthalmology)  Yaima Lopez MD as Assigned PCP  Jenny Laguerre MD as Referring Physician (Pediatrics)  Yaima Maynard MD as MD (Ophthalmology)  YAIMA LOPEZ    Copy to patient  HemantTrisha Peter  [address] [facility] [address]

## 2019-08-22 NOTE — LETTER
8/22/2019      RE: Colette Marcos  [address]  [place] [address]           Rheumatology History:     Date of symptom onset:  4/1/2002  Date of first visit to center:  8/1/2005  Date of OMERO diagnosis:  8/1/2005  ILAR category:  polyarticular (RF-negative)  . 8/18/2017   WILLIAN Status Negative     . 8/18/2017   Rheumatoid Factor Status Negative         Ophthalmology History:     . 12/29/2018   (COIN) Iritis/Uveitis comorbidity? Yes     . 12/29/2018   (COIN) Active Uveitis? No     Date of last eye exam: 1/4/1919  In compliance with eye screening (y/n):             Medications:   As of completion of this visit:  Current Outpatient Medications   Medication Sig Dispense Refill     adalimumab (HUMIRA PEN) 40 MG/0.8ML pen kit Inject 0.8 mLs (40 mg) Subcutaneous every 14 days 2 kit 3     folic acid (FOLVITE) 1 MG tablet Take 1 tablet (1 mg) by mouth daily 30 tablet 11     prednisoLONE acetate (PRED FORTE) 1 % ophthalmic suspension Place 1 drop into the right eye 2 times daily 5 mL 8     skin refrigerant (GEBAUERS PAIN EASE) AERO Use as directed prior to Humira injeciton. 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 Arnold at [facility]    The rheumatologist she will see while in college is:   Dr. Kelly Barney  "  Rheumatology   [facility]   [address]   [place] [address]   Phone: [phone]             Subjective:     Colette is a 19 year old female who was seen in Pediatric Rheumatology clinic today for follow up. Colette is unaccompanied today.  The primary encounter diagnosis was OMERO (juvenile idiopathic arthritis), oligoarthritis, persistent (H). A diagnosis of Secondary iridocyclitis, noninfectious, right was also pertinent to this visit. At the last visit 5 months ago, she was doing well thus we planned to stop the methotrexate if her upcoming eye exam was stable. Since that time she has been doing well. She's been off methotrexate for one month. She will see ophthalmology in October near school then she will see Dr. Maynard again in December.     A 14-point review of systems was negative.     Information per our standardized questionnaire is as below:   Self Report  (COIN) Patient Pain Status: 0  (COIN) Patient Global Assessment Of Disease Activity: 0  Score Reported By: Self  Arthritis History  (COIN) Morning stiffness in the past week: no stiffness  Has your arthritis stopped from trying any athletic or rigorous activities, or interfaced with your ability to do these activities: No  Have you been limited your ability to do normal daily activities in the past week: No  Did you needed help from other people to do normal activities in the past week: No  Have you used any aids or devices to help you do normal daily activities in the past week: No            Examination:   Blood pressure 119/73, pulse 60, temperature 98  F (36.7  C), temperature source Oral, height 1.632 m (5' 4.25\"), weight 77.7 kg (171 lb 4.8 oz), not currently breastfeeding.  Normalized weight-for-age data not available for patients older than 20 years.  Blood pressure percentiles are not available for patients who are 18 years or older.  Gen: Pleasant, well-appearing, NAD  HEENT/Neck: TM's clear " bilaterally, oropharynx is clear without lesions, neck is supple with no lymphadenopathy                  CV: Regular rate and rhythm, normal S1, S2, no murmurs  Resp: Clear to ascultation bilaterally  Abd: Soft, non-tender, non-distended, no hepatosplenomegaly  Skin: Clear, there is no rash  MSK: All joints were examined including TMJ, sternoclavicular, acromioclavicular, neck, shoulder, elbow, wrist, hips, knees, ankles, fingers, and toes, and all were normal except as follows:   JA Exam Details:  (COIN) Sacroiliac tenderness:: No  (COIN) Positive VELMA test:: No  (COIN) Modified Schober's Test:: No        Entheses  (COIN) Tender Entheses count: 0  Positive VELMA test:  No  Modified Schober s (yes/no, cm):  No    Total active joints:  0  Total limited joints:  0  Tender entheses count:  0       Imaging/ Lab Results:     No visits with results within 1 Day(s) from this visit.   Latest known visit with results is:   Orders Only on 05/29/2019   Component Date Value Ref Range Status     Creatinine 05/29/2019 0.78  0.52 - 1.04 mg/dL Final     GFR Estimate 05/29/2019 >90  >60 mL/min/[1.73_m2] Final    Comment: Non  GFR Calc  Starting 12/18/2018, serum creatinine based estimated GFR (eGFR) will be   calculated using the Chronic Kidney Disease Epidemiology Collaboration   (CKD-EPI) equation.       GFR Estimate If Black 05/29/2019 >90  >60 mL/min/[1.73_m2] Final    Comment:  GFR Calc  Starting 12/18/2018, serum creatinine based estimated GFR (eGFR) will be   calculated using the Chronic Kidney Disease Epidemiology Collaboration   (CKD-EPI) equation.       Sed Rate 05/29/2019 9  0 - 20 mm/h Final     Bilirubin Direct 05/29/2019 <0.1  0.0 - 0.2 mg/dL Final     Bilirubin Total 05/29/2019 0.2  0.2 - 1.3 mg/dL Final     Albumin 05/29/2019 3.9  3.4 - 5.0 g/dL Final     Protein Total 05/29/2019 7.6  6.8 - 8.8 g/dL Final     Alkaline Phosphatase 05/29/2019 61  40 - 150 U/L Final     ALT 05/29/2019  29  0 - 50 U/L Final     AST 05/29/2019 23  0 - 45 U/L Final     CRP Inflammation 05/29/2019 <2.9  0.0 - 8.0 mg/L Final     WBC 05/29/2019 9.2  4.0 - 11.0 10e9/L Final     RBC Count 05/29/2019 4.36  3.8 - 5.2 10e12/L Final     Hemoglobin 05/29/2019 13.3  11.7 - 15.7 g/dL Final     Hematocrit 05/29/2019 39.6  35.0 - 47.0 % Final     MCV 05/29/2019 91  78 - 100 fl Final     MCH 05/29/2019 30.5  26.5 - 33.0 pg Final     MCHC 05/29/2019 33.6  31.5 - 36.5 g/dL Final     RDW 05/29/2019 13.0  10.0 - 15.0 % Final     Platelet Count 05/29/2019 294  150 - 450 10e9/L Final     % Neutrophils 05/29/2019 64.9  % Final     % Lymphocytes 05/29/2019 22.3  % Final     % Monocytes 05/29/2019 8.5  % Final     % Eosinophils 05/29/2019 3.9  % Final     % Basophils 05/29/2019 0.4  % Final     Absolute Neutrophil 05/29/2019 6.0  1.6 - 8.3 10e9/L Final     Absolute Lymphocytes 05/29/2019 2.1  0.8 - 5.3 10e9/L Final     Absolute Monocytes 05/29/2019 0.8  0.0 - 1.3 10e9/L Final     Absolute Eosinophils 05/29/2019 0.4  0.0 - 0.7 10e9/L Final     Absolute Basophils 05/29/2019 0.0  0.0 - 0.2 10e9/L Final     Diff Method 05/29/2019 Automated Method   Final     Color Urine 05/29/2019 Yellow   Final     Appearance Urine 05/29/2019 Clear   Final     Glucose Urine 05/29/2019 Negative  NEG^Negative mg/dL Final     Bilirubin Urine 05/29/2019 Negative  NEG^Negative Final     Ketones Urine 05/29/2019 Trace* NEG^Negative mg/dL Final     Specific Gravity Urine 05/29/2019 1.020  1.003 - 1.035 Final     pH Urine 05/29/2019 7.0  5.0 - 7.0 pH Final     Protein Albumin Urine 05/29/2019 Negative  NEG^Negative mg/dL Final     Urobilinogen Urine 05/29/2019 0.2  0.2 - 1.0 EU/dL Final     Nitrite Urine 05/29/2019 Negative  NEG^Negative Final     Blood Urine 05/29/2019 Negative  NEG^Negative Final     Leukocyte Esterase Urine 05/29/2019 Negative  NEG^Negative Final     Source 05/29/2019 Midstream Urine   Final     WBC Urine 05/29/2019 0 - 5  OTO5^0 - 5 /HPF Final      RBC Urine 05/29/2019 O - 2  OTO2^O - 2 /HPF Final          Assessment:     Colette is a 19 year old female with 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 Colette is treated with Humira. She stopped her methotrexate ~5 months ago. Her OMERO and uveitis remain under good control. We've decided at this point to continue Humira monotherapy for the time being.          Plan:     1. Continue current therapies.   2. Continue routine eye exams per ophthalmology.  3. Return in about 4 months (around 12/22/2019). Call sooner with any concerns.     If there are any new questions or concerns, I would be glad to help and can be reached through our main office at [phone] or our paging  at [phone].    Es Arnold MD  Pediatric Rheumatology  [facility]    CC  Patient Care Team:  Yaima Jaime MD as PCP - General (Family Practice)  Jaydon Fofana MD as MD (Ophthalmology)  Roula Matthews PT as Physical Therapist (Physical Therapy)  Félix Anna MD (Ophthalmology)  Jenny Laguerre MD as Referring Physician (Pediatrics)  Yaima Maynard MD as MD (Ophthalmology)    Copy to patient  Trisha Marcos Peter  [address] [facility] [address]

## 2019-08-22 NOTE — PATIENT INSTRUCTIONS
[facility] Pediatric Rheumatology    For Help:  The Pediatric Call Center at [phone] can help with scheduling of routine follow up visits.  Samantha Wynn and Darlyn Ramires are the Nurse Coordinators for the Division of Pediatric Rheumatology and can be reached directly at [phone]. They can help with questions about your child s rheumatic condition, medications, and test results.  For emergencies after hours or on the weekends, please call the page  at [phone] and ask to speak to the physician on-call for Pediatric Rheumatology. Please do not use Call Loop for urgent requests.  Main  Services:  [phone]  o Hmong/Estonian/Estonian: [phone]  o Marshallese: [phone]  o Kinyarwanda: [phone]    For Patient Education Materials:  [email or web address]

## 2019-08-22 NOTE — NURSING NOTE
"Chief Complaint   Patient presents with     Follow Up     OMERO     Vitals:    08/22/19 1103   BP: 119/73   BP Location: Left arm   Patient Position: Sitting   Cuff Size: Adult Large   Pulse: 60   Temp: 98  F (36.7  C)   TempSrc: Oral   Weight: 171 lb 4.8 oz (77.7 kg)   Height: 5' 4.25\" (163.2 cm)     Thea Peck LPN  August 22, 2019  "

## 2019-10-25 ENCOUNTER — TRANSFERRED RECORDS (OUTPATIENT)
Dept: HEALTH INFORMATION MANAGEMENT | Facility: CLINIC | Age: 19
End: 2019-10-25

## 2019-12-20 ENCOUNTER — OFFICE VISIT (OUTPATIENT)
Dept: OPHTHALMOLOGY | Facility: CLINIC | Age: 19
End: 2019-12-20
Attending: OPHTHALMOLOGY
Payer: COMMERCIAL

## 2019-12-20 DIAGNOSIS — H20.11 CHRONIC ANTERIOR UVEITIS OF RIGHT EYE: Primary | ICD-10-CM

## 2019-12-20 DIAGNOSIS — Z96.1 PSEUDOPHAKIA, RIGHT EYE: ICD-10-CM

## 2019-12-20 PROCEDURE — G0463 HOSPITAL OUTPT CLINIC VISIT: HCPCS | Mod: ZF

## 2019-12-20 ASSESSMENT — CUP TO DISC RATIO
OD_RATIO: 0.5
OS_RATIO: 0.5

## 2019-12-20 ASSESSMENT — EXTERNAL EXAM - RIGHT EYE: OD_EXAM: NORMAL

## 2019-12-20 ASSESSMENT — TONOMETRY
OS_IOP_MMHG: 20
IOP_METHOD: TONOPEN
OD_IOP_MMHG: 24
OS_IOP_MMHG: 25
OD_IOP_MMHG: 22
IOP_METHOD: TONOPEN

## 2019-12-20 ASSESSMENT — VISUAL ACUITY
OS_PH_SC: 20/20
OS_SC: 20/30
METHOD: SNELLEN - LINEAR
OD_SC+: -3
OD_SC: 20/25
OS_SC+: -2

## 2019-12-20 ASSESSMENT — CONF VISUAL FIELD
OD_NORMAL: 1
OS_NORMAL: 1

## 2019-12-20 ASSESSMENT — SLIT LAMP EXAM - LIDS
COMMENTS: NORMAL
COMMENTS: NORMAL

## 2019-12-20 ASSESSMENT — EXTERNAL EXAM - LEFT EYE: OS_EXAM: NORMAL

## 2019-12-20 NOTE — PROGRESS NOTES
HPI: Colette Marcos is a 19 year old female with a history of OMERO-associated anterior uveitis right eye here for follow up. No changes or eye concerns since last visit. No eye redness or pain. She has chronic floaters and has not noticed a change in the floaters. She has established eye care near [facility], for easier access if she develops symptoms of a uveitis flare.    Current medications: prednisolone acetate twice a day right eye, Humira q2wk (started 6.7.18). Also takes meloxicam as needed for joints (not really taking). IMT prescribed by rheum: Dr. Es Arnold at [facility].    Prior medications have included various oral NSAIDs for joints and a short course of oral prednisone for joints (patient did not tolerate well, felt sick per mother's report, patient does not recall because it was so long ago). For ease of use she switched to Humira from Remicade 700 mg (~9.5 mg/kg) q4wk (Fall 2013 - 5.7.18), oral methotrexate 10 mg (was on and off methotrexate since age 8-9 years, previously on 15 mg/wk with taper starting early Jan 2019 - stopped July 20, 2019).    Ocular history:   1. 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 Joint disease (RF-negative, polyarticular) first diagnosed at age 5 years (though mother reports that she may have been symptomatic as early as age 2 years with ankle pain and swelling). Follows with Dr. Arnold ([facility] rheum) for joint disease and immunomodulatory therapy. Uveitis first diagnosed at age 8-9 years. Patient and mother report no history of uveitis left eye. Review of electronic records here (through 01/2014) shows no cell left eye.    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   Most recently had 0.5+ anterior chamber cell right eye in August 2017 (low trace, ~1 cell/hpf, was holding MTX at time of exam) and definite 0.5+ anterior chamber cell right eye in August 2016 (2-6 cells/hpf).  2. History of cystoid  macular edema right eye, s/p intravitreal corticosteroid right eye in September 2013 (Dr. Fofana).  3. Pseudophakia right eye s/p cataract extraction/IOL 12.09.14 ([facility]), s/p YAG capsulotomy 04.24.17.    Ocular Imaging:  Macular OCT 1.13.17: within normal limits both eyes    Impression/Plan:  1. OMERO-associated anterior uveitis right eye. There is rare AC cell right eye (not gradable level and stable from last visit), so will have Colette continue with current medications.   - Continue Humira q2wk   - Continue Predforte right eye twice a day, may increase if needed for additional uveitis control   - Follow up in 3 months here, V/T    2. Pseudophakia right eye, stable.   - Monitor    Attending Physician Attestation:  Complete documentation of historical and exam elements from today's encounter can be found in the full encounter summary report (not reduplicated in this progress note).  I personally obtained the chief complaint(s) and history of present illness.  I confirmed and edited as necessary the review of systems, past medical/surgical history, family history, social history, and examination findings as documented by others; and I examined the patient myself.  I personally reviewed the relevant tests, images, and reports as documented above.  I formulated and edited as necessary the assessment and plan and discussed the findings and management plan with the patient and family.  - Yaima Maynard M.D.

## 2019-12-20 NOTE — NURSING NOTE
Chief Complaint(s) and History of Present Illness(es)     Uveitis Follow-Up     In right eye.  Associated symptoms include Negative for dryness, redness and tearing.  Pain was noted as 0/10.              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 Pt notes vision has been good x the last 4 months. Pt denies any other changes.     Ocular meds: prednisolone acetate twice a day right eye, Humira q2wk    SOLITARIO Lui 1:31 PM December 20, 2019

## 2020-01-02 ENCOUNTER — OFFICE VISIT (OUTPATIENT)
Dept: RHEUMATOLOGY | Facility: CLINIC | Age: 20
End: 2020-01-02
Attending: INTERNAL MEDICINE
Payer: COMMERCIAL

## 2020-01-02 VITALS
TEMPERATURE: 97.5 F | SYSTOLIC BLOOD PRESSURE: 122 MMHG | WEIGHT: 177.47 LBS | HEART RATE: 67 BPM | HEIGHT: 64 IN | DIASTOLIC BLOOD PRESSURE: 73 MMHG | BODY MASS INDEX: 30.3 KG/M2

## 2020-01-02 DIAGNOSIS — H20.041 SECONDARY IRIDOCYCLITIS, NONINFECTIOUS, RIGHT: ICD-10-CM

## 2020-01-02 DIAGNOSIS — M08.3 JIA (JUVENILE IDIOPATHIC ARTHRITIS), POLYARTHRITIS, RHEUMAT FACTOR NEG (H): Primary | ICD-10-CM

## 2020-01-02 PROCEDURE — G0463 HOSPITAL OUTPT CLINIC VISIT: HCPCS | Mod: ZF

## 2020-01-02 PROCEDURE — 25000128 H RX IP 250 OP 636: Mod: ZF

## 2020-01-02 PROCEDURE — 90686 IIV4 VACC NO PRSV 0.5 ML IM: CPT | Mod: ZF

## 2020-01-02 PROCEDURE — G0008 ADMIN INFLUENZA VIRUS VAC: HCPCS | Mod: ZF

## 2020-01-02 ASSESSMENT — PAIN SCALES - GENERAL: PAINLEVEL: NO PAIN (0)

## 2020-01-02 ASSESSMENT — MIFFLIN-ST. JEOR: SCORE: 1566.49

## 2020-01-02 NOTE — PROGRESS NOTES
Rheumatology History:     Date of symptom onset:  4/1/2002  Date of first visit to center:  8/1/2005  Date of OMERO diagnosis:  8/1/2005  ILAR category:  polyarticular (RF-negative)  . 8/18/2017   WILLIAN Status Negative     . 8/18/2017   Rheumatoid Factor Status Negative         Ophthalmology History:     . 12/29/2018   (COIN) Iritis/Uveitis comorbidity? Yes     . 12/29/2018   (COIN) Active Uveitis? No     Date of last eye exam: 1/4/1919  In compliance with eye screening (y/n):             Medications:   As of completion of this visit:  Current Outpatient Medications   Medication Sig Dispense Refill     adalimumab (HUMIRA PEN) 40 MG/0.8ML pen kit Inject 0.8 mLs (40 mg) Subcutaneous every 14 days 2 kit 3     prednisoLONE acetate (PRED FORTE) 1 % ophthalmic suspension Place 1 drop into the right eye 2 times daily 5 mL 8     skin refrigerant (GEBAUERS PAIN EASE) AERO Use as directed prior to Humira injeciton. 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 Arnold at [facility]    The rheumatologist she will see while in college is:   Dr. Kelly Barney   Rheumatology   [facility]   [address]   [place] [address]   Phone: [phone]             Subjective:     Colette is a 19 year old female who was seen in Pediatric Rheumatology clinic today for follow up. Colette is unaccompanied. 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 A diagnosis of Secondary iridocyclitis, noninfectious, right was also pertinent to this visit. At the last visit 4 months ago, she was doing well thus we made no changes to therapies. Since that time she has been doing well. She saw Dr. Maynard in ophthalmology on 12/20/19. Her eye disease was stable. They planned to continue current therapies.     Today she reports that she's doing well. She has not had any joint issues.     She's a math major. She had a rash around the eyes from her swimming goggles. She used OTC hydrocortisone cream and this helped. It resolved.     A 14-point review of systems was negative.     Information per our standardized questionnaire is as below:   Self Report  (COIN) Patient Pain Status: 0  (COIN) Patient Global Assessment Of Disease Activity: 0  Arthritis History  (COIN) Morning stiffness in the past week: 15 minutes or less  Has your arthritis stopped from trying any athletic or rigorous activities, or interfaced with your ability to do these activities: No  Have you been limited your ability to do normal daily activities in the past week: No  Did you needed help from other people to do normal activities in the past week: No  Have you used any aids or devices to help you do normal daily activities in the past week: No            Examination:   Blood pressure 122/73, pulse 67, temperature 97.5  F (36.4  C), temperature source Oral, height 1.636 m (5' 4.41\"), weight 80.5 kg (177 lb 7.5 oz), not currently breastfeeding.  Normalized weight-for-age data not available for patients older than 20 years.  Blood pressure percentiles are not available for " patients who are 18 years or older.  Gen: Pleasant, well-appearing, NAD  HEENT/Neck: TM's clear bilaterally, oropharynx is clear without lesions, neck is supple with no lymphadenopathy                  CV: Regular rate and rhythm, normal S1, S2, no murmurs  Resp: Clear to ascultation bilaterally  Abd: Soft, non-tender, non-distended, no hepatosplenomegaly  Skin: Clear, there is no rash  MSK: All joints were examined including TMJ, sternoclavicular, acromioclavicular, neck, shoulder, elbow, wrist, hips, knees, ankles, fingers, and toes, and all were normal except as follows:   JA Exam Details:  (COIN) Sacroiliac tenderness:: No  (COIN) Positive VELMA test:: No  (COIN) Modified Schober's Test:: No        Entheses  (COIN) Tender Entheses count: 0  Positive VELMA test:  No  Modified Schober s (yes/no, cm):  No    Total active joints:  0  Total limited joints:  0  Tender entheses count:  0       Imaging/ Lab Results:     No visits with results within 1 Day(s) from this visit.   Latest known visit with results is:   Orders Only on 05/29/2019   Component Date Value Ref Range Status     Creatinine 05/29/2019 0.78  0.52 - 1.04 mg/dL Final     GFR Estimate 05/29/2019 >90  >60 mL/min/[1.73_m2] Final    Comment: Non  GFR Calc  Starting 12/18/2018, serum creatinine based estimated GFR (eGFR) will be   calculated using the Chronic Kidney Disease Epidemiology Collaboration   (CKD-EPI) equation.       GFR Estimate If Black 05/29/2019 >90  >60 mL/min/[1.73_m2] Final    Comment:  GFR Calc  Starting 12/18/2018, serum creatinine based estimated GFR (eGFR) will be   calculated using the Chronic Kidney Disease Epidemiology Collaboration   (CKD-EPI) equation.       Sed Rate 05/29/2019 9  0 - 20 mm/h Final     Bilirubin Direct 05/29/2019 <0.1  0.0 - 0.2 mg/dL Final     Bilirubin Total 05/29/2019 0.2  0.2 - 1.3 mg/dL Final     Albumin 05/29/2019 3.9  3.4 - 5.0 g/dL Final     Protein Total 05/29/2019 7.6  6.8  - 8.8 g/dL Final     Alkaline Phosphatase 05/29/2019 61  40 - 150 U/L Final     ALT 05/29/2019 29  0 - 50 U/L Final     AST 05/29/2019 23  0 - 45 U/L Final     CRP Inflammation 05/29/2019 <2.9  0.0 - 8.0 mg/L Final     WBC 05/29/2019 9.2  4.0 - 11.0 10e9/L Final     RBC Count 05/29/2019 4.36  3.8 - 5.2 10e12/L Final     Hemoglobin 05/29/2019 13.3  11.7 - 15.7 g/dL Final     Hematocrit 05/29/2019 39.6  35.0 - 47.0 % Final     MCV 05/29/2019 91  78 - 100 fl Final     MCH 05/29/2019 30.5  26.5 - 33.0 pg Final     MCHC 05/29/2019 33.6  31.5 - 36.5 g/dL Final     RDW 05/29/2019 13.0  10.0 - 15.0 % Final     Platelet Count 05/29/2019 294  150 - 450 10e9/L Final     % Neutrophils 05/29/2019 64.9  % Final     % Lymphocytes 05/29/2019 22.3  % Final     % Monocytes 05/29/2019 8.5  % Final     % Eosinophils 05/29/2019 3.9  % Final     % Basophils 05/29/2019 0.4  % Final     Absolute Neutrophil 05/29/2019 6.0  1.6 - 8.3 10e9/L Final     Absolute Lymphocytes 05/29/2019 2.1  0.8 - 5.3 10e9/L Final     Absolute Monocytes 05/29/2019 0.8  0.0 - 1.3 10e9/L Final     Absolute Eosinophils 05/29/2019 0.4  0.0 - 0.7 10e9/L Final     Absolute Basophils 05/29/2019 0.0  0.0 - 0.2 10e9/L Final     Diff Method 05/29/2019 Automated Method   Final     Color Urine 05/29/2019 Yellow   Final     Appearance Urine 05/29/2019 Clear   Final     Glucose Urine 05/29/2019 Negative  NEG^Negative mg/dL Final     Bilirubin Urine 05/29/2019 Negative  NEG^Negative Final     Ketones Urine 05/29/2019 Trace* NEG^Negative mg/dL Final     Specific Gravity Urine 05/29/2019 1.020  1.003 - 1.035 Final     pH Urine 05/29/2019 7.0  5.0 - 7.0 pH Final     Protein Albumin Urine 05/29/2019 Negative  NEG^Negative mg/dL Final     Urobilinogen Urine 05/29/2019 0.2  0.2 - 1.0 EU/dL Final     Nitrite Urine 05/29/2019 Negative  NEG^Negative Final     Blood Urine 05/29/2019 Negative  NEG^Negative Final     Leukocyte Esterase Urine 05/29/2019 Negative  NEG^Negative Final      Source 05/29/2019 Midstream Urine   Final     WBC Urine 05/29/2019 0 - 5  OTO5^0 - 5 /HPF Final     RBC Urine 05/29/2019 O - 2  OTO2^O - 2 /HPF Final          Assessment:     Colette is a 19 year old female with 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 Colette is treated with Humira and Predforte eye drops. The disease is under good control. Therefore we will continue current management. .     Change Since Last Visit: Same  ACR Functional Class: Normal  (COIN) Provider Global Assessment Of Disease Activity: 0  (This is measured on the scale of 0 - 10)         Plan:     1. Monitoring labs were obtained today. They were normal.   2. Continue current therapies.   3. Flu shot today.   4. Continue routine eye exams as per problem list above.   5. Follow-up when home from school sometime in Spring or Summer. Call sooner with any concerns.     If there are any new questions or concerns, I would be glad to help and can be reached through our main office at [phone] or our paging  at [phone].    Es Arnold MD  Pediatric Rheumatology  [facility]  Patient Care Team:  Yaima Lopez MD as PCP - General (Family Practice)  Jaydon Fofana MD as MD (Ophthalmology)  Roula Matthews PT as Physical Therapist (Physical Therapy)  Es Arnold MD as MD (Pediatric Rheumatology)  Félix Anna MD (Ophthalmology)  Yaima Lopez MD as Assigned PCP  Jenny Laguerre MD as Referring Physician (Pediatrics)  Yaima Maynard MD as MD (Ophthalmology)  YAIMA LOPEZ    Copy to patient  Trisha Marcos Peter  [address] [place] [id number]

## 2020-01-02 NOTE — PATIENT INSTRUCTIONS
[facility] Pediatric [facility]umatology    For Help:  The Pediatric Call Center at [phone] can help with scheduling of routine follow up visits.  Samantha Wynn and Darlyn Ramires are the Nurse Coordinators for the Division of Pediatric Rheumatology and can be reached directly at [phone]. They can help with questions about your child s rheumatic condition, medications, and test results.  For emergencies after hours or on the weekends, please call the page  at [phone] and ask to speak to the physician on-call for Pediatric Rheumatology. Please do not use [email or web address] for urgent requests.  Main  Services:  [phone]  o Hmong/Uzbek/Nepali: [phone]  o Grenadian: [phone]  o Tamazight: [phone]    For Patient Education Materials:  [email or web address]/juanita

## 2020-01-02 NOTE — LETTER
1/2/2020      RE: Colette Marcos  [address] [place]  [facility] [address]           Rheumatology History:     Date of symptom onset:  4/1/2002  Date of first visit to center:  8/1/2005  Date of OMERO diagnosis:  8/1/2005  ILAR category:  polyarticular (RF-negative)  . 8/18/2017   WILLIAN Status Negative     . 8/18/2017   Rheumatoid Factor Status Negative         Ophthalmology History:     . 12/29/2018   (COIN) Iritis/Uveitis comorbidity? Yes     . 12/29/2018   (COIN) Active Uveitis? No     Date of last eye exam: 1/4/1919  In compliance with eye screening (y/n):             Medications:   As of completion of this visit:  Current Outpatient Medications   Medication Sig Dispense Refill     adalimumab (HUMIRA PEN) 40 MG/0.8ML pen kit Inject 0.8 mLs (40 mg) Subcutaneous every 14 days 2 kit 3     prednisoLONE acetate (PRED FORTE) 1 % ophthalmic suspension Place 1 drop into the right eye 2 times daily 5 mL 8     skin refrigerant (GEBAUERS PAIN EASE) AERO Use as directed prior to Humira injeciton. 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 Arnold at [facility]    The rheumatologist she will see while in  "college is:   Dr. Kelly Barney   Rheumatology   [facility] [facility]   [address]   [place] [address]   Phone: [phone]             Subjective:     Colette is a 19 year old female who was seen in Pediatric Rheumatology clinic today for follow up. Colette is unaccompanied. 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 A diagnosis of Secondary iridocyclitis, noninfectious, right was also pertinent to this visit. At the last visit 4 months ago, she was doing well thus we made no changes to therapies. Since that time she has been doing well. She saw Dr. Maynard in ophthalmology on 12/20/19. Her eye disease was stable. They planned to continue current therapies.     Today she reports that she's doing well. She has not had any joint issues.     She's a math major. She had a rash around the eyes from her swimming goggles. She used OTC hydrocortisone cream and this helped. It resolved.     A 14-point review of systems was negative.     Information per our standardized questionnaire is as below:   Self Report  (COIN) Patient Pain Status: 0  (COIN) Patient Global Assessment Of Disease Activity: 0  Arthritis History  (COIN) Morning stiffness in the past week: 15 minutes or less  Has your arthritis stopped from trying any athletic or rigorous activities, or interfaced with your ability to do these activities: No  Have you been limited your ability to do normal daily activities in the past week: No  Did you needed help from other people to do normal activities in the past week: No  Have you used any aids or devices to help you do normal daily activities in the past week: No          Examination:   Blood pressure 122/73, pulse 67, temperature 97.5  F (36.4  C), temperature source Oral, height 1.636 m (5' 4.41\"), weight 80.5 kg (177 lb 7.5 oz), not currently breastfeeding.  Normalized weight-for-age data not available for " patients older than 20 years.  Blood pressure percentiles are not available for patients who are 18 years or older.  Gen: Pleasant, well-appearing, NAD  HEENT/Neck: TM's clear bilaterally, oropharynx is clear without lesions, neck is supple with no lymphadenopathy                  CV: Regular rate and rhythm, normal S1, S2, no murmurs  Resp: Clear to ascultation bilaterally  Abd: Soft, non-tender, non-distended, no hepatosplenomegaly  Skin: Clear, there is no rash  MSK: All joints were examined including TMJ, sternoclavicular, acromioclavicular, neck, shoulder, elbow, wrist, hips, knees, ankles, fingers, and toes, and all were normal except as follows:   JA Exam Details:  (COIN) Sacroiliac tenderness:: No  (COIN) Positive VELMA test:: No  (COIN) Modified Schober's Test:: No     Entheses  (COIN) Tender Entheses count: 0  Positive VELMA test:  No  Modified Schober s (yes/no, cm):  No    Total active joints:  0  Total limited joints:  0  Tender entheses count:  0       Imaging/ Lab Results:     No visits with results within 1 Day(s) from this visit.   Latest known visit with results is:   Orders Only on 05/29/2019   Component Date Value Ref Range Status     Creatinine 05/29/2019 0.78  0.52 - 1.04 mg/dL Final     GFR Estimate 05/29/2019 >90  >60 mL/min/[1.73_m2] Final    Comment: Non  GFR Calc  Starting 12/18/2018, serum creatinine based estimated GFR (eGFR) will be   calculated using the Chronic Kidney Disease Epidemiology Collaboration   (CKD-EPI) equation.       GFR Estimate If Black 05/29/2019 >90  >60 mL/min/[1.73_m2] Final    Comment:  GFR Calc  Starting 12/18/2018, serum creatinine based estimated GFR (eGFR) will be   calculated using the Chronic Kidney Disease Epidemiology Collaboration   (CKD-EPI) equation.       Sed Rate 05/29/2019 9  0 - 20 mm/h Final     Bilirubin Direct 05/29/2019 <0.1  0.0 - 0.2 mg/dL Final     Bilirubin Total 05/29/2019 0.2  0.2 - 1.3 mg/dL Final      Albumin 05/29/2019 3.9  3.4 - 5.0 g/dL Final     Protein Total 05/29/2019 7.6  6.8 - 8.8 g/dL Final     Alkaline Phosphatase 05/29/2019 61  40 - 150 U/L Final     ALT 05/29/2019 29  0 - 50 U/L Final     AST 05/29/2019 23  0 - 45 U/L Final     CRP Inflammation 05/29/2019 <2.9  0.0 - 8.0 mg/L Final     WBC 05/29/2019 9.2  4.0 - 11.0 10e9/L Final     RBC Count 05/29/2019 4.36  3.8 - 5.2 10e12/L Final     Hemoglobin 05/29/2019 13.3  11.7 - 15.7 g/dL Final     Hematocrit 05/29/2019 39.6  35.0 - 47.0 % Final     MCV 05/29/2019 91  78 - 100 fl Final     MCH 05/29/2019 30.5  26.5 - 33.0 pg Final     MCHC 05/29/2019 33.6  31.5 - 36.5 g/dL Final     RDW 05/29/2019 13.0  10.0 - 15.0 % Final     Platelet Count 05/29/2019 294  150 - 450 10e9/L Final     % Neutrophils 05/29/2019 64.9  % Final     % Lymphocytes 05/29/2019 22.3  % Final     % Monocytes 05/29/2019 8.5  % Final     % Eosinophils 05/29/2019 3.9  % Final     % Basophils 05/29/2019 0.4  % Final     Absolute Neutrophil 05/29/2019 6.0  1.6 - 8.3 10e9/L Final     Absolute Lymphocytes 05/29/2019 2.1  0.8 - 5.3 10e9/L Final     Absolute Monocytes 05/29/2019 0.8  0.0 - 1.3 10e9/L Final     Absolute Eosinophils 05/29/2019 0.4  0.0 - 0.7 10e9/L Final     Absolute Basophils 05/29/2019 0.0  0.0 - 0.2 10e9/L Final     Diff Method 05/29/2019 Automated Method   Final     Color Urine 05/29/2019 Yellow   Final     Appearance Urine 05/29/2019 Clear   Final     Glucose Urine 05/29/2019 Negative  NEG^Negative mg/dL Final     Bilirubin Urine 05/29/2019 Negative  NEG^Negative Final     Ketones Urine 05/29/2019 Trace* NEG^Negative mg/dL Final     Specific Gravity Urine 05/29/2019 1.020  1.003 - 1.035 Final     pH Urine 05/29/2019 7.0  5.0 - 7.0 pH Final     Protein Albumin Urine 05/29/2019 Negative  NEG^Negative mg/dL Final     Urobilinogen Urine 05/29/2019 0.2  0.2 - 1.0 EU/dL Final     Nitrite Urine 05/29/2019 Negative  NEG^Negative Final     Blood Urine 05/29/2019 Negative  NEG^Negative  Final     Leukocyte Esterase Urine 05/29/2019 Negative  NEG^Negative Final     Source 05/29/2019 Midstream Urine   Final     WBC Urine 05/29/2019 0 - 5  OTO5^0 - 5 /HPF Final     RBC Urine 05/29/2019 O - 2  OTO2^O - 2 /HPF Final          Assessment:     Colette is a 19 year old female with 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 Colette is treated with Humira and Predforte eye drops. The disease is under good control. Therefore we will continue current management. .     Change Since Last Visit: Same  ACR Functional Class: Normal  (COIN) Provider Global Assessment Of Disease Activity: 0  (This is measured on the scale of 0 - 10)         Plan:     1. Monitoring labs were obtained today. They were normal.   2. Continue current therapies.   3. Flu shot today.   4. Continue routine eye exams as per problem list above.   5. Follow-up when home from school sometime in Spring or Summer. Call sooner with any concerns.     If there are any new questions or concerns, I would be glad to help and can be reached through our main office at [phone] or our paging  at [phone].    Es Arnold MD  Pediatric Rheumatology  [facility]    CC  Patient Care Team:  Yaima Jaime MD as PCP - General (Family Practice)  Jaydon Fofana MD as MD (Ophthalmology)  Roula Matthews PT as Physical Therapist (Physical Therapy)  Félix Anna MD (Ophthalmology)  Jenny Laguerre MD as Referring Physician (Pediatrics)  Yaima Maynard MD as MD (Ophthalmology)    Copy to patient  Trisha Marcos Peter  [address] [facility] [address]

## 2020-01-02 NOTE — NURSING NOTE
"Chief Complaint   Patient presents with     RECHECK     OMERO     /73 (BP Location: Left arm, Patient Position: Sitting, Cuff Size: Adult Large)   Pulse 67   Temp 97.5  F (36.4  C) (Oral)   Ht 5' 4.41\" (163.6 cm)   Wt 177 lb 7.5 oz (80.5 kg)   BMI 30.08 kg/m      Jenelle Palomino LPN    "

## 2020-01-24 DIAGNOSIS — H20.9 UVEITIS: ICD-10-CM

## 2020-01-24 DIAGNOSIS — M08.40 JIA (JUVENILE IDIOPATHIC ARTHRITIS), OLIGOARTHRITIS, PERSISTENT (H): ICD-10-CM

## 2020-02-17 ENCOUNTER — HEALTH MAINTENANCE LETTER (OUTPATIENT)
Age: 20
End: 2020-02-17

## 2020-03-13 ENCOUNTER — OFFICE VISIT (OUTPATIENT)
Dept: OPHTHALMOLOGY | Facility: CLINIC | Age: 20
End: 2020-03-13
Attending: OPHTHALMOLOGY
Payer: COMMERCIAL

## 2020-03-13 DIAGNOSIS — Z96.1 PSEUDOPHAKIA, RIGHT EYE: ICD-10-CM

## 2020-03-13 DIAGNOSIS — H20.11 CHRONIC ANTERIOR UVEITIS OF RIGHT EYE: Primary | ICD-10-CM

## 2020-03-13 PROCEDURE — G0463 HOSPITAL OUTPT CLINIC VISIT: HCPCS | Mod: ZF

## 2020-03-13 ASSESSMENT — TONOMETRY
IOP_METHOD: TONOPEN
OD_IOP_MMHG: 16
OS_IOP_MMHG: 18

## 2020-03-13 ASSESSMENT — CONF VISUAL FIELD
OS_NORMAL: 1
OD_NORMAL: 1

## 2020-03-13 ASSESSMENT — EXTERNAL EXAM - RIGHT EYE: OD_EXAM: NORMAL

## 2020-03-13 ASSESSMENT — CUP TO DISC RATIO
OD_RATIO: 0.5
OS_RATIO: 0.5

## 2020-03-13 ASSESSMENT — VISUAL ACUITY
OS_SC: 20/25
OS_SC+: +2
OD_SC: 20/25
METHOD: SNELLEN - LINEAR

## 2020-03-13 ASSESSMENT — SLIT LAMP EXAM - LIDS
COMMENTS: NORMAL
COMMENTS: NORMAL

## 2020-03-13 ASSESSMENT — EXTERNAL EXAM - LEFT EYE: OS_EXAM: NORMAL

## 2020-03-13 NOTE — Clinical Note
Colette continues to do well with Humira and twice a day Predforte right eye. I recommend that she continue with her current treatment. Thanks, Yaima

## 2020-03-13 NOTE — PROGRESS NOTES
HPI: Colette Marcos is a 19 year old female with a history of OMERO-associated anterior uveitis right eye here for routine follow up. No changes or eye concerns since last visit. No eye redness or pain. She has chronic floaters and has not noticed a change in the floaters. She has established eye care near [facility], for easier access if she develops symptoms of a uveitis flare. She will be taking classes remotely at least through April. She reports that her dilated exam in [place] in Oct 2019 was normal.    Current medications: prednisolone acetate twice a day right eye, Humira q2wk (started 6.7.18). Also takes meloxicam as needed for joints (not really taking). IMT prescribed by rheum: Dr. Es Arnold at [facility].    Prior medications have included various oral NSAIDs for joints and a short course of oral prednisone for joints (patient did not tolerate well, felt sick per mother's report, patient does not recall because it was so long ago). For ease of use she switched to Humira from Remicade 700 mg (~9.5 mg/kg) q4wk (Fall 2013 - 5.7.18), oral methotrexate 10 mg (was on and off methotrexate since age 8-9 years, previously on 15 mg/wk with taper starting early Jan 2019 - stopped July 20, 2019).    Ocular history:   1. 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 Joint disease (RF-negative, polyarticular) first diagnosed at age 5 years (though mother reports that she may have been symptomatic as early as age 2 years with ankle pain and swelling). Follows with Dr. Arnold ([facility] rheum) for joint disease and immunomodulatory therapy. Uveitis first diagnosed at age 8-9 years. Patient and mother report no history of uveitis left eye. Review of electronic records here (through 01/2014) shows no cell left eye.    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   Most recently had 0.5+ anterior chamber cell right eye in August 2017 (low trace, ~1 cell/hpf, was holding  MTX at time of exam) and definite 0.5+ anterior chamber cell right eye in August 2016 (2-6 cells/hpf).  2. History of cystoid macular edema right eye, s/p intravitreal corticosteroid right eye in September 2013 (Dr. Fofana).  3. Pseudophakia right eye s/p cataract extraction/IOL 12.09.14 (Bothun), s/p YAG capsulotomy 04.24.17.    Ocular Imaging:  Macular OCT 1.13.17: within normal limits both eyes    Impression/Plan:   1. OMERO-associated anterior uveitis right eye. There is rare AC cell right eye (not gradable level and stable from last visit), so will have Colette continue with current medications.   - Continue Humira q2wk   - Continue Predforte right eye twice a day, may increase if needed for additional uveitis control   - Follow up in 3 months here, V/T, sooner with changes    2. Pseudophakia right eye, stable.   - Monitor    Attending Physician Attestation:  Complete documentation of historical and exam elements from today's encounter can be found in the full encounter summary report (not reduplicated in this progress note).  I personally obtained the chief complaint(s) and history of present illness.  I confirmed and edited as necessary the review of systems, past medical/surgical history, family history, social history, and examination findings as documented by others; and I examined the patient myself.  I personally reviewed the relevant tests, images, and reports as documented above.  I formulated and edited as necessary the assessment and plan and discussed the findings and management plan with the patient and family.  - Yaima Maynard M.D.

## 2020-03-13 NOTE — NURSING NOTE
Chief Complaints and History of Present Illnesses   Patient presents with     Uveitis Follow-Up     Chief Complaint(s) and History of Present Illness(es)     Uveitis Follow-Up     Laterality: right eye    Associated symptoms: Negative for eye pain, floaters, flashes and photophobia    Treatments tried: eye drops    Pain scale: 0/10              Comments     3 month follow up   Pt reports no change since last visit  Prednisolone bid CHAMP APPIAH 2:06 PM March 13, 2020

## 2020-06-19 ENCOUNTER — OFFICE VISIT (OUTPATIENT)
Dept: OPHTHALMOLOGY | Facility: CLINIC | Age: 20
End: 2020-06-19
Attending: OPHTHALMOLOGY
Payer: COMMERCIAL

## 2020-06-19 DIAGNOSIS — H20.11 CHRONIC ANTERIOR UVEITIS OF RIGHT EYE: Primary | ICD-10-CM

## 2020-06-19 DIAGNOSIS — H40.052 BORDERLINE GLAUCOMA OF LEFT EYE WITH OCULAR HYPERTENSION: ICD-10-CM

## 2020-06-19 PROCEDURE — G0463 HOSPITAL OUTPT CLINIC VISIT: HCPCS | Mod: ZF

## 2020-06-19 ASSESSMENT — VISUAL ACUITY
METHOD: SNELLEN - LINEAR
CORRECTION_TYPE: GLASSES
OS_CC: 20/20
OD_CC: 20/25

## 2020-06-19 ASSESSMENT — SLIT LAMP EXAM - LIDS
COMMENTS: NORMAL
COMMENTS: NORMAL

## 2020-06-19 ASSESSMENT — CUP TO DISC RATIO
OS_RATIO: 0.5
OD_RATIO: 0.5

## 2020-06-19 ASSESSMENT — CONF VISUAL FIELD
OS_NORMAL: 1
OD_NORMAL: 1
METHOD: COUNTING FINGERS

## 2020-06-19 ASSESSMENT — EXTERNAL EXAM - RIGHT EYE: OD_EXAM: NORMAL

## 2020-06-19 ASSESSMENT — TONOMETRY
IOP_METHOD: TONOPEN
OS_IOP_MMHG: 23
OS_IOP_MMHG: 24
OD_IOP_MMHG: X
OD_IOP_MMHG: 19
IOP_METHOD: TONOPEN

## 2020-06-19 ASSESSMENT — EXTERNAL EXAM - LEFT EYE: OS_EXAM: NORMAL

## 2020-06-19 NOTE — NURSING NOTE
Chief Complaints and History of Present Illnesses   Patient presents with     Uveitis Follow-Up     Chief Complaint(s) and History of Present Illness(es)     Uveitis Follow-Up     Laterality: right eye    Associated symptoms: Negative for eye pain, redness, burning, photophobia and discharge    Pain scale: 0/10              Comments     Humira q2wk  Pred forte right eye twice a day, may increase if needed for additional uveitis. States has not needed to administer due to any new flare ups.  States no changes since last visit.    Kathya Almonte, COT COT 9:10 AM 06/19/2020

## 2020-06-19 NOTE — PROGRESS NOTES
HPI: Colette Marcos is a 20 year old female with a history of OMERO-associated anterior uveitis right eye here for routine follow up. No changes or eye concerns since last visit. No eye redness or pain. She has chronic floaters and has not noticed them lately. She has established eye care near [facility], for easier access if she develops symptoms of a uveitis flare. She will be returning to classes in September. She reports that her dilated exam in [place] in Oct 2019 was normal.    Current medications: prednisolone acetate twice a day right eye, Humira q2wk (started 6.7.18). Also takes meloxicam as needed for joints (not really taking). IMT prescribed by rheum: Dr. Es Arnold at [facility].    Prior medications have included various oral NSAIDs for joints and a short course of oral prednisone for joints (patient did not tolerate well, felt sick per mother's report, patient does not recall because it was so long ago). For ease of use she switched to Humira from Remicade 700 mg (~9.5 mg/kg) q4wk (Fall 2013 - 5.7.18), oral methotrexate 10 mg (was on and off methotrexate since age 8-9 years, previously on 15 mg/wk with taper starting early Jan 2019 - stopped July 20, 2019).    Ocular history:   1. 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 Joint disease (RF-negative, polyarticular) first diagnosed at age 5 years (though mother reports that she may have been symptomatic as early as age 2 years with ankle pain and swelling). Follows with Dr. Arnold (peds rheum) for joint disease and immunomodulatory therapy. Uveitis first diagnosed at age 8-9 years. Patient and mother report no history of uveitis left eye. Review of electronic records here (through 01/2014) shows no cell left eye.    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   Most recently had 0.5+ anterior chamber cell right eye in August 2017 (low trace, ~1 cell/hpf, was holding MTX at time of exam) and  0.5+ anterior chamber cell right eye in March 2019 (0-2 cells/hpf) and June 2020 (1 cell/hpf).  2. History of cystoid macular edema right eye, s/p intravitreal corticosteroid right eye in September 2013 (Dr. Fofana).  3. Pseudophakia right eye s/p cataract extraction/IOL 12.09.14 ([facility]), s/p YAG capsulotomy 04.24.17.    Ocular Imaging:  Macular OCT 1.13.17: within normal limits both eyes    Impression/Plan:   1. OMERO-associated anterior uveitis right eye. There is low 0.5+ AC cell right eye today.   - Continue Humira q2wk   - Increase Predforte right eye to three times a day   - Follow up in 3 months here, V/T/D/Optos fluorescein angiography transit right eye, sooner with changes    2. Ocular hypertension left eye.   - Retinal nerve fiber layer OCT and 24-2 morales visual field (HVF) next visit    3. Pseudophakia right eye, stable.   - Monitor    return to clinic in 2 months, V/T/RNFL OCT/24-2HVF, sooner with changes    Attending Physician Attestation:  Complete documentation of historical and exam elements from today's encounter can be found in the full encounter summary report (not reduplicated in this progress note).  I personally obtained the chief complaint(s) and history of present illness.  I confirmed and edited as necessary the review of systems, past medical/surgical history, family history, social history, and examination findings as documented by others; and I examined the patient myself.  I personally reviewed the relevant tests, images, and reports as documented above.  I formulated and edited as necessary the assessment and plan and discussed the findings and management plan with the patient and family.  - Yaima Maynard M.D.

## 2020-08-12 DIAGNOSIS — H20.9 UVEITIS: ICD-10-CM

## 2020-08-12 DIAGNOSIS — M08.40 JIA (JUVENILE IDIOPATHIC ARTHRITIS), OLIGOARTHRITIS, PERSISTENT (H): ICD-10-CM

## 2020-08-14 ENCOUNTER — OFFICE VISIT (OUTPATIENT)
Dept: OPHTHALMOLOGY | Facility: CLINIC | Age: 20
End: 2020-08-14
Attending: OPHTHALMOLOGY
Payer: COMMERCIAL

## 2020-08-14 DIAGNOSIS — H20.9 UVEITIS: Primary | ICD-10-CM

## 2020-08-14 DIAGNOSIS — H40.052 BORDERLINE GLAUCOMA OF LEFT EYE WITH OCULAR HYPERTENSION: ICD-10-CM

## 2020-08-14 PROCEDURE — G0463 HOSPITAL OUTPT CLINIC VISIT: HCPCS | Mod: ZF

## 2020-08-14 PROCEDURE — 92015 DETERMINE REFRACTIVE STATE: CPT | Mod: ZF

## 2020-08-14 PROCEDURE — 92133 CPTRZD OPH DX IMG PST SGM ON: CPT | Mod: ZF | Performed by: OPHTHALMOLOGY

## 2020-08-14 PROCEDURE — 92083 EXTENDED VISUAL FIELD XM: CPT | Mod: ZF | Performed by: OPHTHALMOLOGY

## 2020-08-14 RX ORDER — PREDNISOLONE ACETATE 10 MG/ML
1 SUSPENSION/ DROPS OPHTHALMIC 3 TIMES DAILY
Qty: 5 ML | Refills: 1 | Status: SHIPPED | OUTPATIENT
Start: 2020-08-14 | End: 2021-10-29

## 2020-08-14 ASSESSMENT — TONOMETRY
OS_IOP_MMHG: 24
OS_IOP_MMHG: 21
OD_IOP_MMHG: 25
IOP_METHOD: APPLANATION
IOP_METHOD: TONOPEN
OD_IOP_MMHG: 22

## 2020-08-14 ASSESSMENT — REFRACTION_MANIFEST
OD_SPHERE: -0.50
OD_CYLINDER: SPHERE
OS_CYLINDER: SPHERE
OS_SPHERE: -0.75

## 2020-08-14 ASSESSMENT — PACHYMETRY
OD_CT(UM): 571
OS_CT(UM): 569

## 2020-08-14 ASSESSMENT — CONF VISUAL FIELD
METHOD: COUNTING FINGERS
OS_NORMAL: 1
OD_NORMAL: 1

## 2020-08-14 ASSESSMENT — REFRACTION_WEARINGRX
OS_SPHERE: -0.50
OD_SPHERE: -0.50
OD_CYLINDER: +1.50
OS_CYLINDER: SPHERE
OD_AXIS: 060

## 2020-08-14 ASSESSMENT — CUP TO DISC RATIO
OS_RATIO: 0.5
OD_RATIO: 0.5

## 2020-08-14 ASSESSMENT — VISUAL ACUITY
OS_CC: 20/20
CORRECTION_TYPE: GLASSES
OD_CC: 20/30
METHOD: SNELLEN - LINEAR

## 2020-08-14 ASSESSMENT — EXTERNAL EXAM - RIGHT EYE: OD_EXAM: NORMAL

## 2020-08-14 ASSESSMENT — SLIT LAMP EXAM - LIDS
COMMENTS: NORMAL
COMMENTS: NORMAL

## 2020-08-14 ASSESSMENT — EXTERNAL EXAM - LEFT EYE: OS_EXAM: NORMAL

## 2020-08-14 NOTE — PROGRESS NOTES
HPI: Colette Marcos is a 20 year old female with a history of OMERO-associated anterior uveitis right eye here for routine follow up. She is returning to [place] soon for her last year of college. No new eye concerns today. Using her Prednisolone as prescribed - no problems.  No changes or eye concerns since last visit. No eye redness or pain. She has chronic floaters and has not noticed them lately. She has established eye care near [facility], for easier access if she develops symptoms of a uveitis flare. She will be returning to classes in August 29th.     Current medications: prednisolone acetate three times a day right eye, Humira q2wk (started 6.7.18). Also takes meloxicam as needed for joints (not really taking). IMT prescribed by rheum: Dr. Es Arnold at [facility].    Prior medications have included various oral NSAIDs for joints and a short course of oral prednisone for joints (patient did not tolerate well, felt sick per mother's report, patient does not recall because it was so long ago). For ease of use she switched to Humira from Remicade 700 mg (~9.5 mg/kg) q4wk (Fall 2013 - 5.7.18), oral methotrexate 10 mg (was on and off methotrexate since age 8-9 years, previously on 15 mg/wk with taper starting early Jan 2019 - stopped July 20, 2019).    Ocular history:   1. 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 Joint disease (RF-negative, polyarticular) first diagnosed at age 5 years (though mother reports that she may have been symptomatic as early as age 2 years with ankle pain and swelling). Follows with Dr. Arnold (peds rheum) for joint disease and immunomodulatory therapy. Uveitis first diagnosed at age 8-9 years. Patient and mother report no history of uveitis left eye. Review of electronic records here (through 01/2014) shows no cell left eye.    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   Most recently had 0.5+ anterior chamber cell right  eye in August 2017 (low trace, ~1 cell/hpf, was holding MTX at time of exam) and 0.5+ anterior chamber cell right eye in March 2019 (0-2 cells/hpf) and June/Aug 2020 (1 cell/hpf).  2. History of cystoid macular edema right eye, s/p intravitreal corticosteroid right eye in September 2013 (Dr. Fofana).  3. Pseudophakia right eye s/p cataract extraction/IOL 12.09.14 (Bothun), s/p YAG capsulotomy 04.24.17.    Ocular Imaging:  Macular OCT 8.14.20: within normal limits both eyes    Central corneal thickness 8.14.20: 571/569  RNFL OCT 8.14.20: WNL both eyes  OVF 24-2 8.14.20:  OD - reliable. Nonspecific inferior defects. MD -3.3   OS - reliable. Normal. MD 0.1    Impression/Plan:   1. OMERO-associated anterior uveitis right eye. There is low 0.5+ AC cell right eye today, which is tolerable in this post-surgical eye.   - Continue Humira q2wk   - Continue Predforte right eye three times a day   - Follow up in 4-5 months here, V/T/D/Optos fluorescein angiography transit right eye, sooner with changes    2. Ocular hypertension both eyes. Glaucoma testing 8.14.20 (morales visual field (HVF) and retinal nerve fiber layer OCT) show no abnormality.   - Monitor    3. Pseudophakia right eye, stable.   - Monitor    Return to clinic in 4-5 months, over winter break, V/T  Will follow up in [place] in 2-3 months for Anterior chamber and intraocular pressure check.    Lusi Manuel Olguin MD  Ophthalmology PGY-3  [facility]     Attending Physician Attestation:  Complete documentation of historical and exam elements from today's encounter can be found in the full encounter summary report (not reduplicated in this progress note).  I personally obtained the chief complaint(s) and history of present illness.  I confirmed and edited as necessary the review of systems, past medical/surgical history, family history, social history, and examination findings as documented by others; and I examined the patient myself.  I personally  reviewed the relevant tests, images, and reports as documented above.  I formulated and edited as necessary the assessment and plan and discussed the findings and management plan with the patient and family.  - Yaima Maynard M.D.

## 2020-08-14 NOTE — NURSING NOTE
Chief Complaints and History of Present Illnesses   Patient presents with     Uveitis Follow-Up     Chief Complaint(s) and History of Present Illness(es)     Uveitis Follow-Up     Laterality: right eye    Onset: gradual    Onset: months ago    Quality: States va is the same since last visit      Associated symptoms: floaters (in the RE occ).  Negative for flashes and photophobia    Pain scale: 0/10              Comments     Chronic anterior uveitis of right eye   PF RE NIK Olson COT 2:27 PM August 14, 2020

## 2020-10-19 ENCOUNTER — TELEPHONE (OUTPATIENT)
Dept: RHEUMATOLOGY | Facility: CLINIC | Age: 20
End: 2020-10-19

## 2020-10-19 DIAGNOSIS — M08.3 JIA (JUVENILE IDIOPATHIC ARTHRITIS), POLYARTHRITIS, RHEUMAT FACTOR NEG (H): Primary | ICD-10-CM

## 2020-10-19 NOTE — TELEPHONE ENCOUNTER
[facility] Call Center    Phone Message    May a detailed message be left on voicemail: yes     Reason for Call: Other: Request for provider to send order to facility       Mom called and said that pt is in college in [place]. Pt is having blood work done tomorrow at [facility] and parent mentioned that Dr. Arnold created an order for pt. Mom requests that Dr. Arnold sends order to the facility that pt is getting blood work done at. Parent requested to send as urgent, as lab appt is tomorrow at 1:15 eastern time.     Fax # is [phone] & phone # is [phone].    Address: [address] [place] [address] [address] [place] [address]     Action Taken: Message routed to:  Other: Peds rheumatology    Travel Screening: Not Applicable

## 2020-10-23 ENCOUNTER — DOCUMENTATION ONLY (OUTPATIENT)
Dept: RHEUMATOLOGY | Facility: CLINIC | Age: 20
End: 2020-10-23

## 2020-10-23 LAB
ABSOLUTE LYMPHOCYTES (EXTERNAL): 2.4 (ref 0.7–3.1)
ABSOLUTE NEUTROPHILS (EXTERNAL): 5 (ref 1.4–7)
ALBUMIN (EXTERNAL): 4.2 (ref 3.9–5)
ALT SERPL-CCNC: 19 U/L (ref 0–32)
AST SERPL-CCNC: 21 U/L (ref 0–40)
BILIRUB SERPL-MCNC: <0.2 MG/DL (ref 0–1.2)
HEMOGLOBIN: 12.1 G/DL (ref 11.1–15.9)
PLATELET # BLD AUTO: 300 10^9/L (ref 150–450)
WBC # BLD AUTO: 8.4 10^9/L (ref 3.4–10.8)

## 2020-11-29 ENCOUNTER — HEALTH MAINTENANCE LETTER (OUTPATIENT)
Age: 20
End: 2020-11-29

## 2020-12-29 DIAGNOSIS — H20.9 UVEITIS: ICD-10-CM

## 2020-12-29 DIAGNOSIS — M08.40 JIA (JUVENILE IDIOPATHIC ARTHRITIS), OLIGOARTHRITIS, PERSISTENT (H): ICD-10-CM

## 2021-01-07 ENCOUNTER — VIRTUAL VISIT (OUTPATIENT)
Dept: RHEUMATOLOGY | Facility: CLINIC | Age: 21
End: 2021-01-07
Attending: INTERNAL MEDICINE
Payer: COMMERCIAL

## 2021-01-07 DIAGNOSIS — H20.041 SECONDARY IRIDOCYCLITIS, NONINFECTIOUS, RIGHT: ICD-10-CM

## 2021-01-07 DIAGNOSIS — M08.40 JIA (JUVENILE IDIOPATHIC ARTHRITIS), OLIGOARTHRITIS, PERSISTENT (H): Primary | ICD-10-CM

## 2021-01-07 PROCEDURE — 99213 OFFICE O/P EST LOW 20 MIN: CPT | Mod: GT | Performed by: INTERNAL MEDICINE

## 2021-01-07 NOTE — PROGRESS NOTES
Rheumatology History:     Date of symptom onset:  4/1/2002  Date of first visit to center:  8/1/2005  Date of OMERO diagnosis:  8/1/2005  ILAR category:  polyarticular (RF-negative)  . 8/18/2017   WILLIAN Status Negative     . 8/18/2017   Rheumatoid Factor Status Negative         Ophthalmology History:     . 12/29/2018   (COIN) Iritis/Uveitis comorbidity? Yes     . 12/29/2018   (COIN) Current Active Uveitis? No     Date of last eye exam: 1/4/1919  In compliance with eye screening (y/n):             Medications:   As of completion of this visit:  Current Outpatient Medications   Medication Sig Dispense Refill     adalimumab (HUMIRA *CF*) 40 MG/0.4ML pen kit Inject 0.4 mLs (40 mg) Subcutaneous every 14 days 2 each 1     prednisoLONE acetate (PRED FORTE) 1 % ophthalmic suspension Place 1 drop into the right eye 3 times daily 5 mL 1     skin refrigerant (GEBAUERS PAIN EASE) AERO Use as directed prior to Humira injeciton. 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 Arnold at [facility]    The rheumatologist she will see while in college is:   Dr. Kelly Barney   Rheumatology   [facility]   [address]   [place] [address]   Phone: [phone]      Overview:   Sees Dr Arnold, rheumatologist at [facility].             Subjective:     Colette is a 20 year old female who was seen as a virtual visit at the Pediatric Rheumatology clinic today for follow up. Colette is unaccompanied today by her.  There were no encounter diagnoses. At the last visit 12 months ago, she was doing well thus we made no changes to therapies. She had been scheduled to see me as a virtual visit but she was out of state and so we had to cancel it. She last saw Dr. Maynard on 8/14/20 and at that point was doing well and no changes were made. Her next ophthalmology appointment is tomorrow.     Colette reports doing well. No joint concerns. No other issues. She's a senior in college. Does not know where she will be working after college but knows she will be finding a job. No missed doses of Humira. Tolerating it well.     A 14-point review of systems was negative.            Examination:     Gen: Pleasant, well-appearing, NAD  HEENT/Neck: Normal eye movements           CV: Extremities appear warm and well-perfused  Resp: Breathing normally, no labored breathing  Skin: Clear, there is no rash on visible skin  MSK: Joints were examined using the PGALS technique over the video screan including TMJ, sternoclavicular, acromioclavicular, neck, shoulder, elbow, wrist, hips, knees, ankles, fingers, and toes. I assessed for visible swelling, ROM, and pain with ROM. All were normal. No arthritis.        Imaging/ Lab Results:     No visits with results within 1 Day(s) from this visit.   Latest known visit with results is:   Documentation Only on 10/23/2020   Component Date Value Ref Range Status     WBC 10/20/2020 8.4  3.4 - 10.8 10^9/L Final     Hemoglobin 10/20/2020 12.1  11.1 - 15.9 g/dL Final     Platelet Count 10/20/2020 300  150 - 450 10^9/L Final     Absolute Neutrophils (External) 10/20/2020 5.0  1.4 - 7.0 Final     Absolute Lymphocytes (External) 10/20/2020 2.4  0.7 - 3.1 Final      Albumin (External) 10/20/2020 4.2  3.9 - 5.0 Final     Bilirubin Total (External) 10/20/2020 <0.2  0.0 - 1.2 Final     AST (External) 10/20/2020 21  0 - 40 Final     ALT (External) 10/20/2020 19  0 - 32 Final                Assessment:     Colette is a 20 year old female with 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 Her OMERO has been under good control, but she requires treatment for her uveitis. Colette is treated with Humira. Colette was seen in a virtual visit today. The uveitis is under good control. Therefore we will continue current management. She follows with Dr. Maynard here and has an appointment tomorrow. She also sees an ophthalmologist in [place] and her last visit there was 2 months ago. I do not have those records, but per Colette there were no concerns at that point.     Lab work was normal.     Colette is a senior in college and is 20. She will finish college in the Spring and plans to get a job but she does not know where yet. We discussed that as soon as she knows where she will be living she should find an adult rheumatologist. I'll continue to see her through at least through the summer to help with this transition.            Plan:     1. Monitoring labs will be obtained annually. She will be due next October.   2. Continue current therapies.  3. Continue eye exams as per ophthalmology.   4. We discussed a transition plan to adult rheumatology as described above.   5. Return in about 6 months (around 7/7/2021). We discussed that I'd like to see her in the summer, but she does not know if/when she'll be back in [place] in the summer. I asked that she at least give me a call with updates at that point so that I can help with or be aware of a transition plan to adult rheumatology. Call sooner with any concerns.     If there are any new questions or concerns, I would be glad to help and can be reached through our main office  at [phone] or our paging  at [phone].    Es Arnold MD  Pediatric Rheumatology  [facility]          CC  Patient Care Team:  Yaima Lopez MD as PCP - General (Family Practice)  Jaydon Fofana MD as MD (Ophthalmology)  Roula Matthews PT as Physical Therapist (Physical Therapy)  Es Arnold MD as MD (Pediatric Rheumatology)  Félix Anna MD (Ophthalmology)  Jenny Laguerre MD as Referring Physician (Pediatrics)  Yaima Maynard MD as MD (Ophthalmology)  Es Arnold MD as Assigned Pediatric Specialist Provider  Yaima Maynard MD as Assigned Surgical Provider  YAIMA LOPEZ    Copy to patient  Trisha Marcos Peter  [address] [facility] [address]

## 2021-01-07 NOTE — PATIENT INSTRUCTIONS
For Patient Education Materials:  z.[facility]/juanita       [facility] Pediatric Rheumatology    For Help:  The Pediatric Call Center at [phone] can help with scheduling of routine follow up visits.  Samantha Wynn and Darlyn Ramires are the Nurse Coordinators for the Division of Pediatric Rheumatology and can be reached by phone at [phone] or through [employer] ([email or web address]). They can help with questions about your child s rheumatic condition, medications, and test results.  For emergencies after hours or on the weekends, please call the page  at [phone] and ask to speak to the physician on-call for Pediatric Rheumatology. Please do not use LIANAI for urgent requests.  Main  Services:  [phone]  o Hmong/Citizen of [place]: [phone]  o Jamaican: [phone]  o Greek: [phone]    Internal Referrals: If we refer your child to another physician/team within [facility], you should receive a call to set this up. If you do not hear anything within a week, please call the Call Center at [phone].    External Referrals: If we refer your child to a physician/team outside of [facility], our team will send the referral order and relevant records to them. We ask that you call the place where your child is being referred to ensure they received the needed information and notify our team coordinators if not.    Imaging: If your child needs an imaging study that is not being performed the day of your clinic appointment, please call to set this up. For xrays, ultrasounds, and echocardiogram call [phone]. For CT or MRI call [phone].     MyChart: We encourage you to sign up for [facility] at [email or web address]. For assistance or questions, call [phone]. If your child is 12 years or older, a consent for proxy/parent access needs to be signed so please discuss this with your physician at the next visit.

## 2021-01-07 NOTE — PROGRESS NOTES
Colette Marcos is a 20 year old female who is being evaluated via a billable video visit.      How would you like to obtain your AVS? Maggyhart  If the video visit is dropped, the invitation should be resent by: Karina  Will anyone else be joining your video visit? No

## 2021-01-07 NOTE — LETTER
1/7/2021      RE: Colette Marcos  [address]  [facility] [address]       Colette Marcos is a 20 year old female who is being evaluated via a billable video visit.      How would you like to obtain your AVS? Emma  If the video visit is dropped, the invitation should be resent by: Emma  Will anyone else be joining your video visit? No            Rheumatology History:     Date of symptom onset:  4/1/2002  Date of first visit to center:  8/1/2005  Date of OMERO diagnosis:  8/1/2005  ILAR category:  polyarticular (RF-negative)  . 8/18/2017   WILLIAN Status Negative     . 8/18/2017   Rheumatoid Factor Status Negative         Ophthalmology History:     . 12/29/2018   (COIN) Iritis/Uveitis comorbidity? Yes     . 12/29/2018   (COIN) Current Active Uveitis? No     Date of last eye exam: 1/4/1919  In compliance with eye screening (y/n):             Medications:   As of completion of this visit:  Current Outpatient Medications   Medication Sig Dispense Refill     adalimumab (HUMIRA *CF*) 40 MG/0.4ML pen kit Inject 0.4 mLs (40 mg) Subcutaneous every 14 days 2 each 1     prednisoLONE acetate (PRED FORTE) 1 % ophthalmic suspension Place 1 drop into the right eye 3 times daily 5 mL 1     skin refrigerant (GEBAUERS PAIN EASE) AERO Use as directed prior to Humira injeciton. 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 Arnold at [facility]    The rheumatologist she will see while in college is:   Dr. Kelly Barney   Rheumatology   [facility]   [address]   [place] [address]   Phone: [phone]     Overview:   Sees Dr Arnold, rheumatologist at [facility].             Subjective:     Colette is a 20 year old female who was seen as a virtual visit at the Pediatric Rheumatology clinic today for follow up. Colette is unaccompanied today by her.  There were no encounter diagnoses. At the last visit 12 months ago, she was doing well thus we made no changes to therapies. She had been scheduled to see me as a virtual visit but she was out of state and so we had to cancel it. She last saw Dr. Maynard on 8/14/20 and at that point was doing well and no changes were made. Her next ophthalmology appointment is tomorrow.     Colette reports doing well. No joint concerns. No other issues. She's a senior in college. Does not know where she will be working after college but knows she will be finding a job. No missed doses of Humira. Tolerating it well.     A 14-point review of systems was negative.            Examination:     Gen: Pleasant, well-appearing, NAD  HEENT/Neck: Normal eye movements           CV: Extremities appear warm and well-perfused  Resp: Breathing normally, no labored breathing  Skin: Clear, there is no rash on visible skin  MSK: Joints were examined using the PGALS technique over the video screan including TMJ, sternoclavicular, acromioclavicular, neck, shoulder, elbow, wrist, hips, knees, ankles, fingers, and toes. I assessed for visible swelling, ROM, and pain with ROM. All were normal. No arthritis.        Imaging/ Lab Results:     No visits with results within 1 Day(s) from this visit.   Latest known visit with results is:   Documentation Only  on 10/23/2020   Component Date Value Ref Range Status     WBC 10/20/2020 8.4  3.4 - 10.8 10^9/L Final     Hemoglobin 10/20/2020 12.1  11.1 - 15.9 g/dL Final     Platelet Count 10/20/2020 300  150 - 450 10^9/L Final     Absolute Neutrophils (External) 10/20/2020 5.0  1.4 - 7.0 Final     Absolute Lymphocytes (External) 10/20/2020 2.4  0.7 - 3.1 Final     Albumin (External) 10/20/2020 4.2  3.9 - 5.0 Final     Bilirubin Total (External) 10/20/2020 <0.2  0.0 - 1.2 Final     AST (External) 10/20/2020 21  0 - 40 Final     ALT (External) 10/20/2020 19  0 - 32 Final                Assessment:     Colette is a 20 year old female with 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 Her OMERO has been under good control, but she requires treatment for her uveitis. Colette is treated with Humira. Colette was seen in a virtual visit today. The uveitis is under good control. Therefore we will continue current management. She follows with Dr. Maynard here and has an appointment tomorrow. She also sees an ophthalmologist in [place] and her last visit there was 2 months ago. I do not have those records, but per Colette there were no concerns at that point.     Lab work was normal.     Colette is a senior in college and is 20. She will finish college in the Spring and plans to get a job but she does not know where yet. We discussed that as soon as she knows where she will be living she should find an adult rheumatologist. I'll continue to see her through at least through the summer to help with this transition.            Plan:     1. Monitoring labs will be obtained annually. She will be due next October.   2. Continue current therapies.  3. Continue eye exams as per ophthalmology.   4. We discussed a transition plan to adult rheumatology as described above.   5. Return in about 6 months (around 7/7/2021). We discussed that I'd like to see her in the summer, but she does  not know if/when she'll be back in [place] in the summer. I asked that she at least give me a call with updates at that point so that I can help with or be aware of a transition plan to adult rheumatology. Call sooner with any concerns.     If there are any new questions or concerns, I would be glad to help and can be reached through our main office at [phone] or our paging  at [phone].    Es Arnold MD  Pediatric Rheumatology  [facility]          CC  Patient Care Team:  Yaima Jaime MD as PCP - General (Family Practice)  Jaydon Fofana MD as MD (Ophthalmology)  Roula Matthews PT as Physical Therapist (Physical Therapy)  Es Arnold MD as MD (Pediatric Rheumatology)  Félix Anna MD (Ophthalmology)  Jenny Laguerre MD as Referring Physician (Pediatrics)  Yaima Maynard MD as MD (Ophthalmology)    Copy to patient  Trisha Marcos Peter  [address] [facility] [address]

## 2021-01-07 NOTE — NURSING NOTE
Chief Complaint   Patient presents with     Follow Up     JRA     There were no vitals filed for this visit.  Thea Peck LPN  January 7, 2021

## 2021-01-08 ENCOUNTER — OFFICE VISIT (OUTPATIENT)
Dept: OPHTHALMOLOGY | Facility: CLINIC | Age: 21
End: 2021-01-08
Attending: OPHTHALMOLOGY
Payer: COMMERCIAL

## 2021-01-08 DIAGNOSIS — H40.052 BORDERLINE GLAUCOMA OF LEFT EYE WITH OCULAR HYPERTENSION: ICD-10-CM

## 2021-01-08 DIAGNOSIS — Z96.1 PSEUDOPHAKIA, RIGHT EYE: ICD-10-CM

## 2021-01-08 DIAGNOSIS — H20.11 CHRONIC ANTERIOR UVEITIS OF RIGHT EYE: Primary | ICD-10-CM

## 2021-01-08 DIAGNOSIS — H20.9 UVEITIS: ICD-10-CM

## 2021-01-08 PROCEDURE — G0463 HOSPITAL OUTPT CLINIC VISIT: HCPCS

## 2021-01-08 PROCEDURE — 99213 OFFICE O/P EST LOW 20 MIN: CPT | Performed by: OPHTHALMOLOGY

## 2021-01-08 ASSESSMENT — CONF VISUAL FIELD
OD_NORMAL: 1
OS_NORMAL: 1

## 2021-01-08 ASSESSMENT — VISUAL ACUITY
OS_SC+: +2
OD_SC: 20/25
OS_SC: 20/30
OD_SC+: -1
METHOD: SNELLEN - LINEAR

## 2021-01-08 ASSESSMENT — TONOMETRY
OS_IOP_MMHG: 15
OD_IOP_MMHG: 14
IOP_METHOD: APPLANATION

## 2021-01-08 ASSESSMENT — CUP TO DISC RATIO
OS_RATIO: 0.5
OD_RATIO: 0.5

## 2021-01-08 ASSESSMENT — SLIT LAMP EXAM - LIDS
COMMENTS: NORMAL
COMMENTS: NORMAL

## 2021-01-08 ASSESSMENT — EXTERNAL EXAM - RIGHT EYE: OD_EXAM: NORMAL

## 2021-01-08 ASSESSMENT — EXTERNAL EXAM - LEFT EYE: OS_EXAM: NORMAL

## 2021-01-08 NOTE — PROGRESS NOTES
HPI: Colette Marcos is a 20 year old female with a history of OMERO-associated anterior uveitis right eye here for routine follow up. She is returning to [place] in a few weeks for her last year of college. No eye changes or concerns today. Using her Prednisolone as prescribed - no problems.  No changes or eye concerns since last visit.     Current medications: prednisolone acetate three times a day right eye, Humira q2wk (started 6.7.18). IMT prescribed by rheum: Dr. Es Arnold at [facility].    Prior medications have included various oral NSAIDs for joints and a short course of oral prednisone for joints (patient did not tolerate well, felt sick per mother's report, patient does not recall because it was so long ago). For ease of use she switched to Humira from Remicade 700 mg (~9.5 mg/kg) q4wk (Fall 2013 - 5.7.18), oral methotrexate 10 mg (was on and off methotrexate since age 8-9 years, previously on 15 mg/wk with taper starting early Jan 2019 - stopped July 20, 2019).    Ocular history:   1. 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 Joint disease (RF-negative, polyarticular) first diagnosed at age 5 years (though mother reports that she may have been symptomatic as early as age 2 years with ankle pain and swelling). Follows with Dr. Arnold ([facility] rheum) for joint disease and immunomodulatory therapy. Uveitis first diagnosed at age 8-9 years. Patient and mother report no history of uveitis left eye. Review of electronic records here (through 01/2014) shows no cell left eye.    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   Most recently had 0.5+ anterior chamber cell right eye in August 2017 (low trace, ~1 cell/hpf, was holding MTX at time of exam) and 0.5+ anterior chamber cell right eye in March 2019 (0-2 cells/hpf) and June/Aug 2020 (1 cell/hpf).  2. History of cystoid macular edema right eye, s/p intravitreal corticosteroid right eye in September 2013 (  Brigido).  3. Pseudophakia right eye s/p cataract extraction/IOL 12.09.14 (Both[facility]), s/p YAG capsulotomy 04.24.17.    Ocular Imaging:  Macular OCT 8.14.20: within normal limits both eyes    Central corneal thickness 8.14.20: 571/569  RNFL OCT 8.14.20: WNL both eyes  OVF 24-2 8.14.20:  OD - reliable. Nonspecific inferior defects. MD -3.3   OS - reliable. Normal. MD 0.1    Impression/Plan:   1. OMERO-associated anterior uveitis right eye. There is low 0.5+ AC white blood cell right eye today, which is tolerable in this post-surgical eye.   - Continue Humira q2wk   - Continue Predforte right eye three times a day    2. Ocular hypertension both eyes. Glaucoma testing 8.14.20 (morales visual field (HVF) and retinal nerve fiber layer OCT) show no abnormality.   - Monitor    3. Pseudophakia right eye, stable.   - Monitor    Will follow up in [place] in 2-3 months for Anterior chamber and intraocular pressure check.  Follow up in 4-5 months here, V/T/D/Optos fluorescein angiography transit right eye, sooner with changes      Attending Physician Attestation:  Complete documentation of historical and exam elements from today's encounter can be found in the full encounter summary report (not reduplicated in this progress note).  I personally obtained the chief complaint(s) and history of present illness.  I confirmed and edited as necessary the review of systems, past medical/surgical history, family history, social history, and examination findings as documented by others; and I examined the patient myself.  I personally reviewed the relevant tests, images, and reports as documented above.  I formulated and edited as necessary the assessment and plan and discussed the findings and management plan with the patient and family.  - Yaima Maynard M.D.

## 2021-01-08 NOTE — NURSING NOTE
Chief Complaints and History of Present Illnesses   Patient presents with     Uveitis Follow-Up     Chief Complaint(s) and History of Present Illness(es)     Uveitis Follow-Up     Laterality: right eye    Associated symptoms: floaters (RE).  Negative for eye pain, flashes, tearing, dryness, itching and discharge    Pain scale: 0/10              Comments     Colette is here to continue care for Uveitis - Right Eye. She states no vision change since last visit.     Jaydon Silveira COT 12:02 PM January 8, 2021

## 2021-01-12 ENCOUNTER — TELEPHONE (OUTPATIENT)
Dept: RHEUMATOLOGY | Facility: CLINIC | Age: 21
End: 2021-01-12

## 2021-01-12 NOTE — TELEPHONE ENCOUNTER
[facility] Call Center    Phone Message    May a detailed message be left on voicemail: yes     Reason for Call: Medication Refill Request    Has the patient contacted the pharmacy for the refill? Yes   Name of medication being requested: Seferino  Provider who prescribed the medication: Catalina  Pharmacy:    [facility] - [place], [place] - [address]     Date medication is needed: Mom is wondering a new PA can be sent due to patients insurance changing at the beginning of 2021, please reach out to them with questions         Action Taken: Other: rheumatology    Travel Screening: Not Applicable

## 2021-01-14 NOTE — TELEPHONE ENCOUNTER
Prior Authorization Not Needed per Insurance    Medication: Humira Pen  Insurance Company:  [employer]   Expected CoPay:    $200- $5 with copay card  Pharmacy Filling the Rx:  [facility]   Pharmacy Notified:  Yes  Patient Notified:  No     Per Test claim no PA needed at this time

## 2021-03-22 DIAGNOSIS — H20.9 UVEITIS: ICD-10-CM

## 2021-03-22 DIAGNOSIS — M08.40 JIA (JUVENILE IDIOPATHIC ARTHRITIS), OLIGOARTHRITIS, PERSISTENT (H): Primary | ICD-10-CM

## 2021-04-10 ENCOUNTER — HEALTH MAINTENANCE LETTER (OUTPATIENT)
Age: 21
End: 2021-04-10

## 2021-06-25 ENCOUNTER — OFFICE VISIT (OUTPATIENT)
Dept: OPHTHALMOLOGY | Facility: CLINIC | Age: 21
End: 2021-06-25
Attending: OPHTHALMOLOGY
Payer: COMMERCIAL

## 2021-06-25 DIAGNOSIS — H20.11 CHRONIC ANTERIOR UVEITIS OF RIGHT EYE: Primary | ICD-10-CM

## 2021-06-25 DIAGNOSIS — H43.9: ICD-10-CM

## 2021-06-25 DIAGNOSIS — H20.9 UVEITIS: ICD-10-CM

## 2021-06-25 PROCEDURE — 92235 FLUORESCEIN ANGRPH MLTIFRAME: CPT | Performed by: OPHTHALMOLOGY

## 2021-06-25 PROCEDURE — 99214 OFFICE O/P EST MOD 30 MIN: CPT | Performed by: OPHTHALMOLOGY

## 2021-06-25 PROCEDURE — G0463 HOSPITAL OUTPT CLINIC VISIT: HCPCS

## 2021-06-25 ASSESSMENT — SLIT LAMP EXAM - LIDS
COMMENTS: NORMAL
COMMENTS: NORMAL

## 2021-06-25 ASSESSMENT — VISUAL ACUITY
OS_SC+: -2
OS_SC: 20/25
METHOD: SNELLEN - LINEAR
OD_SC: 20/20

## 2021-06-25 ASSESSMENT — TONOMETRY
IOP_METHOD: TONOPEN
OS_IOP_MMHG: 19
OD_IOP_MMHG: 21

## 2021-06-25 ASSESSMENT — CONF VISUAL FIELD
METHOD: COUNTING FINGERS
OS_NORMAL: 1

## 2021-06-25 ASSESSMENT — EXTERNAL EXAM - RIGHT EYE: OD_EXAM: NORMAL

## 2021-06-25 ASSESSMENT — CUP TO DISC RATIO
OS_RATIO: 0.5
OD_RATIO: 0.5

## 2021-06-25 ASSESSMENT — EXTERNAL EXAM - LEFT EYE: OS_EXAM: NORMAL

## 2021-06-25 NOTE — PROGRESS NOTES
HPI: Colette Marcos is a 21 year old female with a history of OMERO-associated anterior uveitis right eye here for routine follow up.  Using her Prednisolone as prescribed - no problems.  No eye changes or concerns since last visit.     Current medications: prednisolone acetate three times a day right eye, Humira q2wk (started 6.7.18). IMT prescribed by rheum: Dr. Es Arnold at [facility].    Prior medications have included various oral NSAIDs for joints and a short course of oral prednisone for joints (patient did not tolerate well, felt sick per mother's report, patient does not recall because it was so long ago). For ease of use she switched to Humira from Remicade 700 mg (~9.5 mg/kg) q4wk (Fall 2013 - 5.7.18), oral methotrexate 10 mg (was on and off methotrexate since age 8-9 years, previously on 15 mg/wk with taper starting early Jan 2019 - stopped July 20, 2019).    Ocular history:   1. 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 Joint disease (RF-negative, polyarticular) first diagnosed at age 5 years (though mother reports that she may have been symptomatic as early as age 2 years with ankle pain and swelling). [facility]. Arnold ([facility]) for joint disease and immunomodulatory therapy. Uveitis first diagnosed at age 8-9 years. Patient and mother report no history of uveitis left eye. Review of electronic records here (through 01/2014) shows no cell left eye.    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   Most recently had 0.5+ anterior chamber cell right eye in August 2017 (low trace, ~1 cell/hpf, was holding MTX at time of exam) and 0.5+ anterior chamber cell right eye in March 2019 (0-2 cells/hpf) and June/Aug 2020 (1 cell/hpf).  2. History of cystoid macular edema right eye, s/p intravitreal corticosteroid right eye in September 2013 (Dr. Fofana).  3. Pseudophakia right eye s/p cataract extraction/IOL 12.09.14 ([facility]), s/p YAG  capsulotomy 04.24.17.    Ocular Imaging:  Optos fluorescein angiography 6.25.21: no leakage in either eye    Macular OCT 8.14.20: within normal limits both eyes    Central corneal thickness 8.14.20: 571/569  RNFL OCT 8.14.20: WNL both eyes  OVF 24-2 8.14.20:  OD - reliable. Nonspecific inferior defects. MD -3.3   OS - reliable. Normal. MD 0.1    Impression/Plan:   1. OMERO-associated anterior uveitis right eye, quiescent today. No retinal vasculitis on fluorescein angiography today.   - Continue Humira q2wk   - Continue Predforte right eye three times a day    2. Ocular hypertension both eyes. Glaucoma testing 8.14.20 (morales visual field (HVF) and retinal nerve fiber layer OCT) show no abnormality.   - Monitor    3. Pseudophakia right eye, stable.   - Monitor    4. Posterior vitreous detachment (PVD) right eye, stable.   - Monitor    Follow up in 4 months, V/T, sooner with changes      Attending Physician Attestation:  Complete documentation of historical and exam elements from today's encounter can be found in the full encounter summary report (not reduplicated in this progress note).  I personally obtained the chief complaint(s) and history of present illness.  I confirmed and edited as necessary the review of systems, past medical/surgical history, family history, social history, and examination findings as documented by others; and I examined the patient myself.  I personally reviewed the relevant tests, images, and reports as documented above.  I formulated and edited as necessary the assessment and plan and discussed the findings and management plan with the patient and family.  - Yaima Maynard M.D.

## 2021-06-25 NOTE — LETTER
6/25/2021      RE: Colette Marcos  [address] [facility] [address]       HPI: Colette Marcos is a 21 year old female with a history of OMERO-associated anterior uveitis right eye here for routine follow up.  Using her Prednisolone as prescribed - no problems.  No eye changes or concerns since last visit.     Current medications: prednisolone acetate three times a day right eye, Humira q2wk (started 6.7.18). IMT prescribed by rheum: Dr. Es Arnold at [facility].    Prior medications have included various oral NSAIDs for joints and a short course of oral prednisone for joints (patient did not tolerate well, felt sick per mother's report, patient does not recall because it was so long ago). For ease of use she switched to Humira from Remicade 700 mg (~9.5 mg/kg) q4wk (Fall 2013 - 5.7.18), oral methotrexate 10 mg (was on and off methotrexate since age 8-9 years, previously on 15 mg/wk with taper starting early Jan 2019 - stopped July 20, 2019).    Ocular history:   1. 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 Joint disease (RF-negative, polyarticular) first diagnosed at age 5 years (though mother reports that she may have been symptomatic as early as age 2 years with ankle pain and swelling). Follows with Dr. Arnold (peds rheum) for joint disease and immunomodulatory therapy. Uveitis first diagnosed at age 8-9 years. Patient and mother report no history of uveitis left eye. Review of electronic records here (through 01/2014) shows no cell left eye.    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   Most recently had 0.5+ anterior chamber cell right eye in August 2017 (low trace, ~1 cell/hpf, was holding  MTX at time of exam) and 0.5+ anterior chamber cell right eye in March 2019 (0-2 cells/hpf) and Melissa/Aug  2020 (1 cell/hpf).  2. History of cystoid macular edema right eye, s/p intravitreal corticosteroid right eye in September 2013 (  Brigido).  3. Pseudophakia right eye s/p cataract extraction/IOL 12.09.14 ([facility]), s/p YAG capsulotomy 04.24.17.    Ocular Imaging:  Optos fluorescein angiography 6.25.21: no leakage in either eye    Macular OCT 8.14.20: within normal limits both eyes    Central corneal thickness 8.14.20: 571/569  RNFL OCT 8.14.20: WNL both eyes  OVF 24-2 8.14.20:  OD - reliable. Nonspecific inferior defects. MD -3.3   OS - reliable. Normal. MD 0.1    Impression/Plan:   1. OMERO-associated anterior uveitis right eye, quiescent today. No retinal vasculitis on fluorescein angiography today.   - Continue Humira q2wk   - Continue Predforte right eye three times a day    2. Ocular hypertension both eyes. Glaucoma testing 8.14.20 (morales visual field (HVF) and retinal nerve fiber layer OCT) show no abnormality.   - Monitor    3. Pseudophakia right eye, stable.   - Monitor    4. Posterior vitreous detachment (PVD) right eye, stable.   - Monitor    Follow up in 4 months, V/T, sooner with changes    Attending Physician Attestation:  Complete documentation of historical and exam elements from today's encounter can be found in the full encounter summary report (not reduplicated in this progress note).  I personally obtained the chief complaint(s) and history of present illness.  I confirmed and edited as necessary the review of systems, past medical/surgical history, family history, social history, and examination findings as documented by others; and I examined the patient myself.  I personally reviewed the relevant tests, images, and reports as documented above.  I formulated and edited as necessary the assessment and plan and discussed the findings and management plan with the patient and family.  - Yaima Maynard M.D.    Sincerely,    Yaima Maynard MD

## 2021-07-16 ASSESSMENT — ENCOUNTER SYMPTOMS
HEMATOCHEZIA: 0
BREAST MASS: 0
SHORTNESS OF BREATH: 0
SORE THROAT: 0
HEADACHES: 0
ABDOMINAL PAIN: 0
HEMATURIA: 0
DIARRHEA: 0
CHILLS: 0
NERVOUS/ANXIOUS: 1
COUGH: 0
FREQUENCY: 0
NAUSEA: 0
DYSURIA: 0
MYALGIAS: 0
CONSTIPATION: 0
EYE PAIN: 0
PARESTHESIAS: 0
ARTHRALGIAS: 0
PALPITATIONS: 0
FEVER: 0
WEAKNESS: 0
HEARTBURN: 0
JOINT SWELLING: 0
DIZZINESS: 0

## 2021-07-16 NOTE — PROGRESS NOTES
SUBJECTIVE:   CC: Colette Marcos is an 21 year old woman who presents for preventive health visit.       Patient has been advised of split billing requirements and indicates understanding: Yes  Healthy Habits:     Getting at least 3 servings of Calcium per day:  Yes    Bi-annual eye exam:  Yes    Dental care twice a year:  Yes    Sleep apnea or symptoms of sleep apnea:  None    Diet:  Gluten-free/reduced    Frequency of exercise:  4-5 days/week    Duration of exercise:  15-30 minutes    Taking medications regularly:  Yes    Medication side effects:  None    PHQ-2 Total Score: 1    Additional concerns today:  No      Due for pap and td    Has stable chronic uveitis sees [facility] specialist    JRKAVEH well controlled with humira    Today's PHQ-2 Score:   PHQ-2 ( 1999 [employer]) 7/19/2021   Q1: Little interest or pleasure in doing things 0   Q2: Feeling down, depressed or hopeless 1   PHQ-2 Score 1   Q1: Little interest or pleasure in doing things Not at all   Q2: Feeling down, depressed or hopeless Several days   PHQ-2 Score 1       Abuse: Current or Past (Physical, Sexual or Emotional) - No  Do you feel safe in your environment? Yes    Have you ever done Advance Care Planning? (For example, a Health Directive, POLST, or a discussion with a medical provider or your loved ones about your wishes): No, advance care planning information given to patient to review.  Patient declined advance care planning discussion at this time.    Social History     Tobacco Use     Smoking status: Never Smoker     Smokeless tobacco: Never Used   Substance Use Topics     Alcohol use: No     Alcohol/week: 0.0 standard drinks     If you drink alcohol do you typically have >3 drinks per day or >7 drinks per week? No    Alcohol Use 7/19/2021   Prescreen: >3 drinks/day or >7 drinks/week? No       Reviewed orders with patient.  Reviewed health maintenance and updated orders accordingly - Yes  Lab work is in process    Breast Cancer  Screening:        History of abnormal Pap smear: NO - age 21-29 PAP every 3 years recommended     Reviewed and updated as needed this visit by clinical staff  Tobacco  Allergies    Med Hx            Reviewed and updated as needed this visit by Provider                Past Medical History:   Diagnosis Date     OMERO (juvenile idiopathic arthritis), oligoarthritis, extended (H) 6/7/2012     OMERO (juvenile idiopathic arthritis), oligoarthritis, persistent (H) 6/7/2012     Nonsenile cataract      Uveitis       Past Surgical History:   Procedure Laterality Date     CATARACT IOL, RT/LT       EXAM UNDER ANESTHESIA EYE(S) Bilateral 12/9/2014    Procedure: EXAM UNDER ANESTHESIA EYE(S);  Surgeon: Ramon Manuel MD;  Location: [facility]     INJECT STEROID (LOCATION)       LENSECTOMY CHILD Right 12/9/2014    Procedure: LENSECTOMY CHILD;  Surgeon: Ramon Manuel MD;  Location: [facility] OR       Review of Systems   Constitutional: Negative for chills and fever.   HENT: Positive for congestion. Negative for ear pain, hearing loss and sore throat.    Eyes: Negative for pain and visual disturbance.   Respiratory: Negative for cough and shortness of breath.    Cardiovascular: Negative for chest pain, palpitations and peripheral edema.   Gastrointestinal: Negative for abdominal pain, constipation, diarrhea, heartburn, hematochezia and nausea.   Breasts:  Negative for tenderness, breast mass and discharge.   Genitourinary: Negative for dysuria, frequency, genital sores, hematuria, pelvic pain, urgency, vaginal bleeding and vaginal discharge.   Musculoskeletal: Negative for arthralgias, joint swelling and myalgias.   Skin: Negative for rash.   Neurological: Negative for dizziness, weakness, headaches and paresthesias.   Psychiatric/Behavioral: Negative for mood changes. The patient is nervous/anxious.      CONSTITUTIONAL: NEGATIVE for fever, chills, change in weight  INTEGUMENTARU/SKIN: NEGATIVE for worrisome rashes, moles or lesions  EYES:  "NEGATIVE for vision changes or irritation  ENT: NEGATIVE for ear, mouth and throat problems  RESP: NEGATIVE for significant cough or SOB  BREAST: NEGATIVE for masses, tenderness or discharge  CV: NEGATIVE for chest pain, palpitations or peripheral edema  GI: NEGATIVE for nausea, abdominal pain, heartburn, or change in bowel habits  : NEGATIVE for unusual urinary or vaginal symptoms. Periods are regular.  MUSCULOSKELETAL: NEGATIVE for significant arthralgias or myalgia  NEURO: NEGATIVE for weakness, dizziness or paresthesias  PSYCHIATRIC: NEGATIVE for changes in mood or affect     OBJECTIVE:   /84 (BP Location: Right arm, Cuff Size: Adult Regular)   Pulse 101   Temp 99  F (37.2  C) (Tympanic)   Ht 1.626 m (5' 4\")   Wt 83.9 kg (185 lb)   SpO2 95%   BMI 31.76 kg/m    Physical Exam  GENERAL: healthy, alert and no distress  EYES: Eyes grossly normal to inspection, PERRL and conjunctivae and sclerae normal  HENT: ear canals and TM's normal, nose and mouth without ulcers or lesions  NECK: no adenopathy, no asymmetry, masses, or scars and thyroid normal to palpation  RESP: lungs clear to auscultation - no rales, rhonchi or wheezes  BREAST: normal without masses, tenderness or nipple discharge and no palpable axillary masses or adenopathy  CV: regular rate and rhythm, normal S1 S2, no S3 or S4, no murmur, click or rub, no peripheral edema and peripheral pulses strong  ABDOMEN: soft, nontender, no hepatosplenomegaly, no masses and bowel sounds normal   (female): normal female external genitalia, normal urethral meatus, vaginal mucosa, normal cervix/adnexa/uterus without masses or discharge  SKIN: no suspicious lesions or rashes  NEURO: Normal strength and tone, mentation intact and speech normal  PSYCH: mentation appears normal, affect normal/bright  LYMPH: no cervical, supraclavicular, axillary, or inguinal adenopathy    Diagnostic Test Results:  Labs reviewed in Epic    ASSESSMENT/PLAN:   1. Routine general " "medical examination at a health care facility    - Pap imaged thin layer screen with HPV - recommended age 30 - 65 years    2. Stable JRA - joints are fine takes humira for eyes   Cystoid macular degeneration of right eye   Stable uveitis on humira UTD on eye exam    3. Uveitis        Patient has been advised of split billing requirements and indicates understanding: Yes  COUNSELING:  Reviewed preventive health counseling, as reflected in patient instructions       Regular exercise       Healthy diet/nutrition    Estimated body mass index is 31.76 kg/m  as calculated from the following:    Height as of this encounter: 1.626 m (5' 4\").    Weight as of this encounter: 83.9 kg (185 lb).    Weight management plan: Discussed healthy diet and exercise guidelines    She reports that she has never smoked. She has never used smokeless tobacco.      Counseling Resources:  ATP IV Guidelines  Pooled Cohorts Equation Calculator  Breast Cancer Risk Calculator  BRCA-Related Cancer Risk Assessment: FHS-7 Tool  FRAX Risk Assessment  ICSI Preventive Guidelines  Dietary Guidelines for Americans, 2010  USDA's MyPlate  ASA Prophylaxis  Lung CA Screening    Yaima Jaime MD  [facility]  "

## 2021-07-19 ENCOUNTER — OFFICE VISIT (OUTPATIENT)
Dept: FAMILY MEDICINE | Facility: CLINIC | Age: 21
End: 2021-07-19
Payer: COMMERCIAL

## 2021-07-19 VITALS
OXYGEN SATURATION: 95 % | WEIGHT: 185 LBS | HEART RATE: 101 BPM | SYSTOLIC BLOOD PRESSURE: 137 MMHG | HEIGHT: 64 IN | DIASTOLIC BLOOD PRESSURE: 84 MMHG | TEMPERATURE: 99 F | BODY MASS INDEX: 31.58 KG/M2

## 2021-07-19 DIAGNOSIS — H20.9 UVEITIS: ICD-10-CM

## 2021-07-19 DIAGNOSIS — Z00.00 ROUTINE GENERAL MEDICAL EXAMINATION AT A HEALTH CARE FACILITY: Primary | ICD-10-CM

## 2021-07-19 DIAGNOSIS — H35.351 CYSTOID MACULAR DEGENERATION OF RIGHT EYE: ICD-10-CM

## 2021-07-19 DIAGNOSIS — M08.00 JUVENILE RHEUMATOID ARTHRITIS (H): ICD-10-CM

## 2021-07-19 PROCEDURE — 90471 IMMUNIZATION ADMIN: CPT | Performed by: FAMILY MEDICINE

## 2021-07-19 PROCEDURE — G0145 SCR C/V CYTO,THINLAYER,RESCR: HCPCS | Performed by: FAMILY MEDICINE

## 2021-07-19 PROCEDURE — 99385 PREV VISIT NEW AGE 18-39: CPT | Mod: 25 | Performed by: FAMILY MEDICINE

## 2021-07-19 PROCEDURE — 90714 TD VACC NO PRESV 7 YRS+ IM: CPT | Performed by: FAMILY MEDICINE

## 2021-07-19 ASSESSMENT — ENCOUNTER SYMPTOMS
COUGH: 0
ARTHRALGIAS: 0
FREQUENCY: 0
ABDOMINAL PAIN: 0
HEARTBURN: 0
MYALGIAS: 0
NAUSEA: 0
PALPITATIONS: 0
SHORTNESS OF BREATH: 0
JOINT SWELLING: 0
PARESTHESIAS: 0
DIZZINESS: 0
DYSURIA: 0
FEVER: 0
HEMATOCHEZIA: 0
WEAKNESS: 0
CHILLS: 0
EYE PAIN: 0
BREAST MASS: 0
HEMATURIA: 0
HEADACHES: 0
CONSTIPATION: 0
DIARRHEA: 0
SORE THROAT: 0
NERVOUS/ANXIOUS: 1

## 2021-07-19 ASSESSMENT — MIFFLIN-ST. JEOR: SCORE: 1584.15

## 2021-07-22 ENCOUNTER — PATIENT OUTREACH (OUTPATIENT)
Dept: FAMILY MEDICINE | Facility: CLINIC | Age: 21
End: 2021-07-22
Payer: COMMERCIAL

## 2021-07-22 PROBLEM — R87.619 ABNORMAL PAP SMEAR OF CERVIX: Status: ACTIVE | Noted: 2021-07-19

## 2021-07-22 LAB
BKR LAB AP GYN ADEQUACY: ABNORMAL
BKR LAB AP GYN INTERPRETATION: ABNORMAL
BKR LAB AP HPV REFLEX: NO
BKR LAB AP PREVIOUS ABNORMAL: ABNORMAL
PATH REPORT.COMMENTS IMP SPEC: ABNORMAL
PATH REPORT.RELEVANT HX SPEC: ABNORMAL

## 2021-07-22 PROCEDURE — G0124 SCREEN C/V THIN LAYER BY MD: HCPCS | Performed by: PATHOLOGY

## 2021-07-23 NOTE — RESULT ENCOUNTER NOTE
ASCUS pap letter sent through My Chart results. Plan to repeat cytology alone in 1 year.     Liseth Dominguez, RN, PHN, BSN  Pap Tracking Nurse

## 2021-08-06 DIAGNOSIS — H20.9 UVEITIS: ICD-10-CM

## 2021-08-06 DIAGNOSIS — M08.40 JIA (JUVENILE IDIOPATHIC ARTHRITIS), OLIGOARTHRITIS, PERSISTENT (H): ICD-10-CM

## 2021-08-09 DIAGNOSIS — M08.40 JIA (JUVENILE IDIOPATHIC ARTHRITIS), OLIGOARTHRITIS, PERSISTENT (H): Primary | ICD-10-CM

## 2021-08-10 DIAGNOSIS — M08.40 JIA (JUVENILE IDIOPATHIC ARTHRITIS), OLIGOARTHRITIS, PERSISTENT (H): Primary | ICD-10-CM

## 2021-08-10 DIAGNOSIS — H20.9 UVEITIS: ICD-10-CM

## 2021-08-12 DIAGNOSIS — M08.40 JIA (JUVENILE IDIOPATHIC ARTHRITIS), OLIGOARTHRITIS, PERSISTENT (H): ICD-10-CM

## 2021-08-12 DIAGNOSIS — H20.9 UVEITIS: ICD-10-CM

## 2021-08-12 NOTE — PROGRESS NOTES
Cloette is a 21 year old who is being evaluated via a billable video visit.      How would you like to obtain your AVS? MyChart  If the video visit is dropped, the invitation should be resent by: Text to cell phone: [phone]  Will anyone else be joining your video visit? No     Video Start Time: 4:58    Assessment & Plan     Anxiety  Discussed starting medications reviewed potential side effects risks and benefits.  Would like to follow-up with her in 1 month to see how she is doing also discussed other self-help cares in the after visit summary  - sertraline (ZOLOFT) 25 MG tablet; Take 1 tablet (25 mg) by mouth daily    Prescription drug management  18 minutes spent on the date of the encounter doing chart review, history and exam, documentation and further activities per the note        Follow-up in 4 weeks    Return in about 4 weeks (around 9/13/2021) for depression/Anxiety recheck.    Yaima Jaime MD  [facility]   Colette is a 21 year old who presents for the following health issues     HPI     Depression and Anxiety Follow-Up    How are you doing with your depression since your last visit? No change    symptoms present for a few weeks    Anxiety present her whole life    Recently graduated college    Trying to work through this change, find a job    Lives alone    Sees therapist currently    feels this is working    She lives in [place]  Will need to transfer care to a new provider due to change in insurance    Her mother has depression is on zoloft  Brother and sister have adhd/ anxiety  One is also on zoloft  Working well      How are you doing with your anxiety since your last visit?  No change    Are you having other symptoms that might be associated with depression or anxiety? No    Have you had a significant life event? No     Do you have any concerns with your use of alcohol or other drugs? No    Social History     Tobacco Use     Smoking status: Never Smoker      Smokeless tobacco: Never Used   Substance Use Topics     Alcohol use: No     Alcohol/week: 0.0 standard drinks     Drug use: No     PHQ 7/7/2016 8/16/2021   PHQ-9 Total Score 2 9   Q9: Thoughts of better off dead/self-harm past 2 weeks Not at all Not at all     SAMEER-7 SCORE 8/16/2021   Total Score 16 (severe anxiety)   Total Score 16     Last PHQ-9 8/16/2021   1.  Little interest or pleasure in doing things 1   2.  Feeling down, depressed, or hopeless 2   3.  Trouble falling or staying asleep, or sleeping too much 1   4.  Feeling tired or having little energy 1   5.  Poor appetite or overeating 0   6.  Feeling bad about yourself 2   7.  Trouble concentrating 1   8.  Moving slowly or restless 1   Q9: Thoughts of better off dead/self-harm past 2 weeks 0   PHQ-9 Total Score 9   Difficulty at work, home, or with people -     SAMEER-7  8/16/2021   1. Feeling nervous, anxious, or on edge 3   2. Not being able to stop or control worrying 3   3. Worrying too much about different things 3   4. Trouble relaxing 2   5. Being so restless that it is hard to sit still 1   6. Becoming easily annoyed or irritable 2   7. Feeling afraid, as if something awful might happen 2   SAMEER-7 Total Score 16       Suicide Assessment Five-step Evaluation and Treatment (SAFE-T)      How many servings of fruits and vegetables do you eat daily?  4 or more    On average, how many sweetened beverages do you drink each day (Examples: soda, juice, sweet tea, etc.  Do NOT count diet or artificially sweetened beverages)?   0    How many days per week do you exercise enough to make your heart beat faster? 5    How many minutes a day do you exercise enough to make your heart beat faster? 30 - 60    How many days per week do you miss taking your medication? 0        Review of Systems   Constitutional, HEENT, cardiovascular, pulmonary, gi and gu systems are negative, except as otherwise noted.      Objective           Vitals:  No vitals were obtained today due to  virtual visit.    Physical Exam   GENERAL: Healthy, alert and no distress  EYES: Eyes grossly normal to inspection.  No discharge or erythema, or obvious scleral/conjunctival abnormalities.  RESP: No audible wheeze, cough, or visible cyanosis.  No visible retractions or increased work of breathing.    SKIN: Visible skin clear. No significant rash, abnormal pigmentation or lesions.  NEURO: Cranial nerves grossly intact.  Mentation and speech appropriate for age.  PSYCH: Mentation appears normal, affect normal/bright, judgement and insight intact, normal speech and appearance well-groomed.                Video-Visit Details    Type of service:  Video Visit    Video End Time:5:13    Originating Location (pt. Location): Home    Distant Location (provider location):  [facility]     Platform used for Video Visit: CampusTap  Answers for HPI/ROS submitted by the patient on 8/16/2021  If you checked off any problems, how difficult have these problems made it for you to do your work, take care of things at home, or get along with other people?: Very difficult  PHQ9 TOTAL SCORE: 9  SAMEER 7 TOTAL SCORE: 16

## 2021-08-16 ENCOUNTER — VIRTUAL VISIT (OUTPATIENT)
Dept: FAMILY MEDICINE | Facility: CLINIC | Age: 21
End: 2021-08-16
Payer: COMMERCIAL

## 2021-08-16 DIAGNOSIS — F41.9 ANXIETY: Primary | ICD-10-CM

## 2021-08-16 PROCEDURE — 99213 OFFICE O/P EST LOW 20 MIN: CPT | Mod: GT | Performed by: FAMILY MEDICINE

## 2021-08-16 RX ORDER — SERTRALINE HYDROCHLORIDE 25 MG/1
25 TABLET, FILM COATED ORAL DAILY
Qty: 30 TABLET | Refills: 1 | Status: SHIPPED | OUTPATIENT
Start: 2021-08-16 | End: 2021-09-13 | Stop reason: DRUGHIGH

## 2021-08-16 ASSESSMENT — ANXIETY QUESTIONNAIRES
1. FEELING NERVOUS, ANXIOUS, OR ON EDGE: NEARLY EVERY DAY
6. BECOMING EASILY ANNOYED OR IRRITABLE: MORE THAN HALF THE DAYS
5. BEING SO RESTLESS THAT IT IS HARD TO SIT STILL: SEVERAL DAYS
3. WORRYING TOO MUCH ABOUT DIFFERENT THINGS: NEARLY EVERY DAY
7. FEELING AFRAID AS IF SOMETHING AWFUL MIGHT HAPPEN: MORE THAN HALF THE DAYS
8. IF YOU CHECKED OFF ANY PROBLEMS, HOW DIFFICULT HAVE THESE MADE IT FOR YOU TO DO YOUR WORK, TAKE CARE OF THINGS AT HOME, OR GET ALONG WITH OTHER PEOPLE?: VERY DIFFICULT
7. FEELING AFRAID AS IF SOMETHING AWFUL MIGHT HAPPEN: MORE THAN HALF THE DAYS
GAD7 TOTAL SCORE: 16
GAD7 TOTAL SCORE: 16
2. NOT BEING ABLE TO STOP OR CONTROL WORRYING: NEARLY EVERY DAY
4. TROUBLE RELAXING: MORE THAN HALF THE DAYS
GAD7 TOTAL SCORE: 16

## 2021-08-16 ASSESSMENT — PATIENT HEALTH QUESTIONNAIRE - PHQ9
SUM OF ALL RESPONSES TO PHQ QUESTIONS 1-9: 9
SUM OF ALL RESPONSES TO PHQ QUESTIONS 1-9: 9
10. IF YOU CHECKED OFF ANY PROBLEMS, HOW DIFFICULT HAVE THESE PROBLEMS MADE IT FOR YOU TO DO YOUR WORK, TAKE CARE OF THINGS AT HOME, OR GET ALONG WITH OTHER PEOPLE: VERY DIFFICULT

## 2021-08-16 NOTE — PATIENT INSTRUCTIONS
"I think counseling would be very helpful for you.  The best way to find out if a counselor is covered under your insurance is to call the mental health line on the back of your card and see where they cover.  Here are some other good people or clinics that I've worked with:    There is the [facility], which is free,  It's run by volunteers - both people who are fully licensed and people who are still under supervision.    [email or web address]  [facility] [place] [address]  [phone]    [email or web address] - this is a search engine to look for bios on therapists    [employer]   and POC talk therapy  Several sites  [phone]    Lisandra Griffin:  [employer]LLER[employer] consulting      Lyric Gallego  Transracial and international adoption    Jenna Ulloa:  POC interest    [facility] -  therapist group    Kesma  African American therapist      Centered  Care:  [facility]    Dr. Franklin Tenorio  \"Chemistry of Zenaida\"  [facility]  Does not take insurance    Rekha Winter:  [phone] - no insurance  [phone]    Lisandra Echols -- Art of Counseling:  [facility]  Takes insurance  Sees teens    Beatriz Harsha:  Psych recovery  [address]  [address]  [place], [place]  [phone]      Alina Morgan PhD, LP  Licensed Psychologist  [phone]  [email or web address]    Natalie Velásquez MA  [facility]  [address]  [place], [place] [address] [phone]    [facility]  [phone]  Halima Santos or others    [facility]  [address]  [place]   [phone] (answered 24 hours)      Glory Young  Specializes in Eating Disorders  [address]  [phone]    Joy Overton   EMDR provider      Obdulio Schmidt  [facility] G[facility], Loss and " Transition  [address] [place] [address] [phone]    [facility]  Marilyn Alvarado LP OR Carly Gershone [facility])  [phone]  They are very good and have therapists who focus on grief, anxiety and adjustment    Yaima Clark M.A., JASON  Specialties:  Adjustment Problems/Stress, Anxiety, Death and Dying, Depression, Grief and Loss, Physical/Chronic Health Issues, Posttraumatic Stress Disorder, Self Esteem, Trauma, Work Issues  Services:  Couples Psychotherapy, EMDR, Group Therapy, Individual psychotherapy  Ages Served:  Adolescent (age 13-17), Adult (age 18-64), Elder (age 65+)    Private Practice  [address] [address]  [facility]  [address] [place] [phone]    Ping GREGORY  Individual and Group Psychotherapy  Dialectic Behavior Therapy  Teens, Adults  [facility]  [phone]  [email or web address]  [employer]  [facility] [phone]    [facility]  [phone]  [phone]  Dr. Suzette Torres  Therapist  [facility]  [phone]    Yaima Ba: [phone]    Tabby Navarro: [phone]    Bernadette Bloom:  [phone]    [facility]:  [phone]    Dr. Krystle Reid  Child psychologist (0-8+)  [phone]  [address] [place] [address]    Dheeraj Doshi Belt  [phone]  Peds Psychiatry     [facility]/Eating disorders  Candice Deal  [phone]  [email or web address]  General number  [phone] SLP    Mental health emergency:    [facility] mobile crisis teams:  Adults - COPE [phone]    CHildren 17 and under - [phone]      DEPRESSION: PATIENT SELF CARE PLAN    Self-Care for Depression  Here s the deal. Your body and mind are really not as separate as most people think.  What you do and think affects how you feel and how you feel influences what you do and think. This means if you  do things that people who feel good do, it will help you feel better.  Sometimes this is all it takes.  There is also a place for medication and therapy depending on how severe your depression is, so be sure to consult with your medical provider and/ or Behavioral Health Consultant if your symptoms are worsening or not improving.     In order to better manage my stress, I will:   Exercise  Get some form of exercise, every day. This will help reduce pain and release endorphins, the  feel good  chemicals in your brain. This is almost as good as taking antidepressants!  This is not the same as joining a gym and then never going! (they count on that by the way ) It can be as simple as just going for a walk or doing some gardening, anything that will get you moving.      I plan to to exercise in the form of hiking, biking, walking, swimming, running, jogging, tennis, golf, rollerblading, skiing and yoga, with a frequency of 4x per week.  I realize that some days I will miss, but I will commit to getting back on schedule for my own good.  Hygiene   Maintain good hygiene (Get out of bed in the morning, Make your bed, Brush your teeth, Take a shower, and Get dressed like you were going to work, even if you are unemployed).  If your clothes don't fit try to get ones that do.    Diet  I will strive to eat foods that are good for me, drink plenty of water, and avoid excessive sugar, caffeine, alcohol, and other mood-altering substances.  Some foods that are helpful in depression are: complex carbohydrates, B vitamins, flaxseed, fish or fish oil, fresh fruits and vegetables.    Psychotherapy  I agree to participate in Individual Therapy      Medication  I agree to take my medication as prescribed.  Missing doses can result in serious side effects.  I understand that drinking alcohol may cause potential side effects.  I will not stop my medication abruptly without first discussing it with my provider.      Light Therapy  This  may be an option for some individuals with depression.  Talk with your provider about obtaining a presciption for full-spectrum lighting.    Staying Connected With Others  I will stay in touch with my friends, family members, and my primary care provider/team.    Use your imagination  Be creative.  We all have a creative side; it doesn t matter if it s oil painting, sand castles, or mud pies! This will also kick up the endorphins.  Witness Beauty  (AKA stop and smell the roses) Take a look outside, even in mid-winter. Notice colors, textures. Watch the squirrels and birds.     Service to others  Be of service to others.  There is always someone else in need.  By helping others we can  get out of ourselves  and remember the really important things.  This also provides opportunities for practicing all the other parts of the program.  Humor  Laugh and be silly!  Adjust your TV habits for less news and crime-drama and more comedy.  Control your stress  Try breathing deep, massage therapy, biofeedback, and meditation. Find time to relax each day.

## 2021-08-17 ASSESSMENT — ANXIETY QUESTIONNAIRES: GAD7 TOTAL SCORE: 16

## 2021-09-13 ENCOUNTER — VIRTUAL VISIT (OUTPATIENT)
Dept: FAMILY MEDICINE | Facility: CLINIC | Age: 21
End: 2021-09-13
Payer: COMMERCIAL

## 2021-09-13 DIAGNOSIS — F43.21 ADJUSTMENT DISORDER WITH DEPRESSED MOOD: Primary | ICD-10-CM

## 2021-09-13 PROCEDURE — 99213 OFFICE O/P EST LOW 20 MIN: CPT | Mod: GT | Performed by: FAMILY MEDICINE

## 2021-09-13 PROCEDURE — 96127 BRIEF EMOTIONAL/BEHAV ASSMT: CPT | Mod: 95 | Performed by: FAMILY MEDICINE

## 2021-09-13 ASSESSMENT — ANXIETY QUESTIONNAIRES
1. FEELING NERVOUS, ANXIOUS, OR ON EDGE: SEVERAL DAYS
7. FEELING AFRAID AS IF SOMETHING AWFUL MIGHT HAPPEN: SEVERAL DAYS
2. NOT BEING ABLE TO STOP OR CONTROL WORRYING: SEVERAL DAYS
GAD7 TOTAL SCORE: 8
GAD7 TOTAL SCORE: 8
5. BEING SO RESTLESS THAT IT IS HARD TO SIT STILL: SEVERAL DAYS
GAD7 TOTAL SCORE: 8
3. WORRYING TOO MUCH ABOUT DIFFERENT THINGS: SEVERAL DAYS
4. TROUBLE RELAXING: SEVERAL DAYS
6. BECOMING EASILY ANNOYED OR IRRITABLE: MORE THAN HALF THE DAYS
7. FEELING AFRAID AS IF SOMETHING AWFUL MIGHT HAPPEN: SEVERAL DAYS

## 2021-09-13 ASSESSMENT — PATIENT HEALTH QUESTIONNAIRE - PHQ9
SUM OF ALL RESPONSES TO PHQ QUESTIONS 1-9: 7
SUM OF ALL RESPONSES TO PHQ QUESTIONS 1-9: 7
10. IF YOU CHECKED OFF ANY PROBLEMS, HOW DIFFICULT HAVE THESE PROBLEMS MADE IT FOR YOU TO DO YOUR WORK, TAKE CARE OF THINGS AT HOME, OR GET ALONG WITH OTHER PEOPLE: SOMEWHAT DIFFICULT

## 2021-09-14 ASSESSMENT — ANXIETY QUESTIONNAIRES: GAD7 TOTAL SCORE: 8

## 2021-09-14 ASSESSMENT — PATIENT HEALTH QUESTIONNAIRE - PHQ9: SUM OF ALL RESPONSES TO PHQ QUESTIONS 1-9: 7

## 2021-09-24 NOTE — TELEPHONE ENCOUNTER
NOTES Status Details   OFFICE NOTE from referring provider Internal 08.09.2021 Es Arnold MD   OFFICE NOTE from other specialist Internal 07.19.2021   Yaima Jaime MD     04.24.2017 Zainab Mclean MD     DISCHARGE SUMMARY from hospital Care Everywhere 07.11.2021 [facility]   DISCHARGE REPORT from the ER N/A    MEDICATION LIST Internal    LABS (Any and all labs)      N/A    Biopsy/pathology (Anything related to diagnoses I.e. fluid aspirations, lip biopsy, muscle biopsy)      N/A     N/A    Imaging (All imaging related to diagnoses)     Echo N/A    HRCT N/A    CXR N/A    EMG N/A                   Scleroderma/Dermatomyositis diagnoses     Previous Cardiology notes  N/A    Previous Pulmonary notes N/A    Previous Dermatology notes N/A    Previous GI notes N/A    Lupus diagnoses     Previous Nephrology notes N/A    Previous Dermatology notes N/A    Previous Cardiology notes N/A

## 2021-09-25 ENCOUNTER — HEALTH MAINTENANCE LETTER (OUTPATIENT)
Age: 21
End: 2021-09-25

## 2021-09-27 ENCOUNTER — OFFICE VISIT (OUTPATIENT)
Dept: RHEUMATOLOGY | Facility: CLINIC | Age: 21
End: 2021-09-27
Attending: INTERNAL MEDICINE
Payer: COMMERCIAL

## 2021-09-27 ENCOUNTER — PRE VISIT (OUTPATIENT)
Dept: RHEUMATOLOGY | Facility: CLINIC | Age: 21
End: 2021-09-27

## 2021-09-27 VITALS
HEIGHT: 64 IN | DIASTOLIC BLOOD PRESSURE: 84 MMHG | WEIGHT: 188.7 LBS | HEART RATE: 71 BPM | BODY MASS INDEX: 32.21 KG/M2 | TEMPERATURE: 98.9 F | SYSTOLIC BLOOD PRESSURE: 145 MMHG

## 2021-09-27 DIAGNOSIS — Z79.899 HIGH RISK MEDICATION USE: ICD-10-CM

## 2021-09-27 DIAGNOSIS — M08.3 JIA (JUVENILE IDIOPATHIC ARTHRITIS), POLYARTHRITIS, RHEUMAT FACTOR NEG (H): Primary | ICD-10-CM

## 2021-09-27 DIAGNOSIS — M08.40 JIA (JUVENILE IDIOPATHIC ARTHRITIS), OLIGOARTHRITIS, PERSISTENT (H): ICD-10-CM

## 2021-09-27 PROCEDURE — 99214 OFFICE O/P EST MOD 30 MIN: CPT | Performed by: STUDENT IN AN ORGANIZED HEALTH CARE EDUCATION/TRAINING PROGRAM

## 2021-09-27 PROCEDURE — G0463 HOSPITAL OUTPT CLINIC VISIT: HCPCS

## 2021-09-27 ASSESSMENT — MIFFLIN-ST. JEOR: SCORE: 1600.94

## 2021-09-27 ASSESSMENT — PAIN SCALES - GENERAL: PAINLEVEL: NO PAIN (0)

## 2021-09-27 NOTE — PATIENT INSTRUCTIONS
I have taken over the adalimumab (humira) orders and sent a prescription to [facility].    Labs today and in 6 months    See you in 12 months or sooner if needed    Mychart me if anything comes!

## 2021-09-27 NOTE — LETTER
9/27/2021       RE: Colette Marcos  [address]  [facility] [address]     Dear Colleague,    Thank you for referring your patient, Colette Marcos, to the [facility] [place] at [facility]. Please see a copy of my visit note below.    [facility] Rheumatology  Date of Service: 9/27/2021     Referring provider: Dr. Es Arnold  Referral for: OMERO    CC: transfer of care from [facility] rheum    HPI: She was told that her earliest symptoms were around 2 or 2 yo when she was having persistent ankle pain following a dance class. As symptoms persisted over the years she was evaluated for lyme disease and eventually was formally diagnosed with OMERO at 5 years old and was found to have eye involvement. She was treated with infliximab and methotrexate for some time, eventually able to taper off methotrexate successfully. Because she was going away for college she switched from infliximab to adalimumab and that was also done successfully with no recurrence of joint or eye disease.    Most recently she was having some trouble with her right wrist but she notes that that is related to abnormal growth of certain bones in her wrist. Her wrists hurt when she is writing a lot or playing the violin. Physical therapy has helped but not significantly. When symptoms bother her her wrists feel very tight.    Her eye exams have been stable. No rashes, no joint pain, no swollen joints. No morning stiffness.    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   ALLERGIES: Patient has no known allergies.   MEDS: personally reviewed, notable for Humira 40 mg every 14 days and predforte eye drops.    PRIOR RHEUM  "MEDS:  infliximab infusion - [id number] transitioned because went to college  Methotrexate - started in elementary school. Weekly injections. weaned off slowly first year of college, would vomit day after methotrexate an/ord often felt nauseas the next day    PMHx:  Past Medical History:   Diagnosis Date     Abnormal Pap smear of cervix 07/19/2021     OMERO (juvenile idiopathic arthritis), oligoarthritis, extended (H) 06/07/2012     OMERO (juvenile idiopathic arthritis), oligoarthritis, persistent (H) 06/07/2012     Nonsenile cataract      Uveitis     - left ankle fracture 2012  - history of positive CMV 2015    PSHx:  Past Surgical History:   Procedure Laterality Date     CATARACT IOL, RT/LT       EXAM UNDER ANESTHESIA EYE(S) Bilateral 12/9/2014    Procedure: EXAM UNDER ANESTHESIA EYE(S);  Surgeon: Ramon Manuel MD;  Location: [facility] OR     INJECT STEROID (LOCATION)       LENSECTOMY CHILD Right 12/9/2014    Procedure: LENSECTOMY CHILD;  Surgeon: Ramon Manuel MD;  [facility] OR       SocHx:  Social History     Tobacco Use     Smoking status: Never Smoker     Smokeless tobacco: Never Used   Substance Use Topics     Alcohol use: No     Alcohol/week: 0.0 standard drinks     Drug use: No   never smoker  Just finished college, majored in math, 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 No known family history of autoimmune disease    PHYSICAL EXAM  Vitals: BP (!) 145/84   Pulse 71   Temp 98.9  F (37.2  C) (Oral)   Ht 1.626 m (5' 4\")   Wt 85.6 kg (188 lb 11.2 oz)   BMI 32.39 kg/m    BMI= Body mass index is 32.39 kg/m .   General: NAD  HEENT: face symmetric, moist mucosa, no oral ulcers  Resp: comfortably breathing on room air  CV: regular, warm extremities  MSK: no pain or tenderness and normal ROM in DIPs, PIPs, MCPs, wrists, elbows, shoulders, ankles, knees, hips, SIJ.     LAB REVIEW:  RHEUM RESULTS Latest Ref Rng & Units 7/9/2008 " 8/20/2008 10/8/2008   ALBUMIN 3.4 - 5.0 g/dL 4.3 4.2 4.6   ALBUMIN (EXTERNAL) 3.9 - 5.0 - - -   ALT 0 - 50 U/L 11 11 14   AST 0 - 45 U/L 32 32 35   CREATININE 0.52 - 1.04 mg/dL 0.50 - 0.44   CREATININE (EXTERNAL) 0.6 - 1.1 - - -   CRP 0.0 - 8.0 mg/L - 8.8(H) <3.0   SARITA - - <1.0 -   GFR ESTIMATE, IF BLACK >60 mL/min/[1.73:m2] GFR not calculated, patient <16 years old. - GFR not calculated, patient <16 years old.   GFR ESTIMATE >60 mL/min/[1.73:m2] GFR not calculated, patient <16 years old. - GFR not calculated, patient <16 years old.   HEMATOCRIT 35.0 - 47.0 % 35.7 34.2 38.0   HEMOGLOBIN 11.1 - 15.9 g/dL 11.4 10.7 12.1   HEPBANG NEG - Negative -   HCVAB NEG - Negative -   WBC 3.4 - 10.8 10:9/L 7.4 6.8 6.6   RBC 3.8 - 5.2 10e12/L 4.47 4.32 4.48   RDW 10.0 - 15.0 % 13.3 14.9 18.4(H)   MCHC 31.5 - 36.5 g/dL 31.9 31.3(L) 31.8   MCV 78 - 100 fl 80 79 85   PLATELET COUNT 150 - 450 10:9/L 523(H) 446 373   RHEUMATOID FACTOR 0 - 14 IU/mL - 9 -   ESR 0 - 20 mm/h 54(H) 34(H) 9     Lab Results   Component Value Date/Time    RHF 9 08/20/2008 12:41 PM     HLA B27 negative  2008    Lab Results   Component Value Date/Time    HEPBANG Negative 08/20/2008 12:41 PM        IMAGING, the following have been personally reviewed:  7/2016 XR feet: Normal radiograph of the feet.  7/2016 XR wrists: Improved negative ulnar variance on the right. No new erosions.  7/2016 XR ankles: Normal right and left ankles      ASSESSMENT: 22 yo with RF-WILLIAN- polyarticular OMERO involving right knee, right ankle, right midfoot, and right eye who has had stable disease on adalimumab 40 mg every 14 days. She continues to require predforte eyedrops and has elevated eye pressures.    PLAN:  -- get baseline xrays of hands & feet, baseline disease activity labs, update infectious screens  -- repeat medication monitoring labs in 6 months  -- take over adalimumab 40 mg every 14 days  -- will obtain WILLIAN by IF (previously done by EIA) and CCP to round our serological  status.   -- continue monitoring per ophthamology    RTC in 1 year    Josiane Carolina MD, PhD  Rheumatology

## 2021-09-27 NOTE — PROGRESS NOTES
[facility] Rheumatology  Date of Service: 9/27/2021     Referring provider: Dr. Es Arnold  Referral for: OMERO    CC: transfer of care from peds rheum    HPI: She was told that her earliest symptoms were around 2 or 4 yo when she was having persistent ankle pain following a dance class. As symptoms persisted over the years she was evaluated for lyme disease and eventually was formally diagnosed with OMERO at 5 years old and was found to have eye involvement. She was treated with infliximab and methotrexate for some time, eventually able to taper off methotrexate successfully. Because she was going away for college she switched from infliximab to adalimumab and that was also done successfully with no recurrence of joint or eye disease.    Most recently she was having some trouble with her right wrist but she notes that that is related to abnormal growth of certain bones in her wrist. Her wrists hurt when she is writing a lot or playing the violin. Physical therapy has helped but not significantly. When symptoms bother her her wrists feel very tight.    Her eye exams have been stable. No rashes, no joint pain, no swollen joints. No morning stiffness.    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   ALLERGIES: Patient has no known allergies.   MEDS: personally reviewed, notable for Humira 40 mg every 14 days and predforte eye drops.    PRIOR RHEUM MEDS:  infliximab infusion - [id number] transitioned because went to college  Methotrexate - started in elementary school. Weekly injections. weaned off slowly first year of college, would vomit day after methotrexate an/ord often felt nauseas the next day    PMHx:  Past Medical History:   Diagnosis Date     Abnormal Pap smear of  "cervix 07/19/2021     OMERO (juvenile idiopathic arthritis), oligoarthritis, extended (H) 06/07/2012     OMERO (juvenile idiopathic arthritis), oligoarthritis, persistent (H) 06/07/2012     Nonsenile cataract      Uveitis     - left ankle fracture 2012  - history of positive CMV 2015    PSHx:  Past Surgical History:   Procedure Laterality Date     CATARACT IOL, RT/LT       EXAM UNDER ANESTHESIA EYE(S) Bilateral 12/9/2014    Procedure: EXAM UNDER ANESTHESIA EYE(S);  Surgeon: Ramon Manuel MD;  [facility]     INJECT STEROID (LOCATION)       LENSECTOMY CHILD Right 12/9/2014    Procedure: LENSECTOMY CHILD;  Surgeon: Ramon Manuel MD;  [facility]       SocHx:  Social History     Tobacco Use     Smoking status: Never Smoker     Smokeless tobacco: Never Used   Substance Use Topics     Alcohol use: No     Alcohol/week: 0.0 standard drinks     Drug use: No   never smoker  Just finished college, majored in math, 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 No known family history of autoimmune disease    PHYSICAL EXAM  Vitals: BP (!) 145/84   Pulse 71   Temp 98.9  F (37.2  C) (Oral)   Ht 1.626 m (5' 4\")   Wt 85.6 kg (188 lb 11.2 oz)   BMI 32.39 kg/m    BMI= Body mass index is 32.39 kg/m .   General: NAD  HEENT: face symmetric, moist mucosa, no oral ulcers  Resp: comfortably breathing on room air  CV: regular, warm extremities  MSK: no pain or tenderness and normal ROM in DIPs, PIPs, MCPs, wrists, elbows, shoulders, ankles, knees, hips, SIJ.     LAB REVIEW:  RHEUM RESULTS Latest Ref Rng & Units 7/9/2008 8/20/2008 10/8/2008   ALBUMIN 3.4 - 5.0 g/dL 4.3 4.2 4.6   ALBUMIN (EXTERNAL) 3.9 - 5.0 - - -   ALT 0 - 50 U/L 11 11 14   AST 0 - 45 U/L 32 32 35   CREATININE 0.52 - 1.04 mg/dL 0.50 - 0.44   CREATININE (EXTERNAL) 0.6 - 1.1 - - -   CRP 0.0 - 8.0 mg/L - 8.8(H) <3.0   SARITA - - <1.0 -   GFR ESTIMATE, IF BLACK >60 mL/min/[1.73:m2] " GFR not calculated, patient <16 years old. - GFR not calculated, patient <16 years old.   GFR ESTIMATE >60 mL/min/[1.73:m2] GFR not calculated, patient <16 years old. - GFR not calculated, patient <16 years old.   HEMATOCRIT 35.0 - 47.0 % 35.7 34.2 38.0   HEMOGLOBIN 11.1 - 15.9 g/dL 11.4 10.7 12.1   HEPBANG NEG - Negative -   HCVAB NEG - Negative -   WBC 3.4 - 10.8 10:9/L 7.4 6.8 6.6   RBC 3.8 - 5.2 10e12/L 4.47 4.32 4.48   RDW 10.0 - 15.0 % 13.3 14.9 18.4(H)   MCHC 31.5 - 36.5 g/dL 31.9 31.3(L) 31.8   MCV 78 - 100 fl 80 79 85   PLATELET COUNT 150 - 450 10:9/L 523(H) 446 373   RHEUMATOID FACTOR 0 - 14 IU/mL - 9 -   ESR 0 - 20 mm/h 54(H) 34(H) 9     Lab Results   Component Value Date/Time    RHF 9 08/20/2008 12:41 PM     HLA B27 negative  2008    Lab Results   Component Value Date/Time    HEPBANG Negative 08/20/2008 12:41 PM        IMAGING, the following have been personally reviewed:  7/2016 XR feet: Normal radiograph of the feet.  7/2016 XR wrists: Improved negative ulnar variance on the right. No new erosions.  7/2016 XR ankles: Normal right and left ankles      ASSESSMENT: 22 yo with RF-WILLIAN- polyarticular OMERO involving right knee, right ankle, right midfoot, and right eye who has had stable disease on adalimumab 40 mg every 14 days. She continues to require predforte eyedrops and has elevated eye pressures.    PLAN:  -- get baseline xrays of hands & feet, baseline disease activity labs, update infectious screens  -- repeat medication monitoring labs in 6 months  -- take over adalimumab 40 mg every 14 days  -- will obtain WILLIAN by IF (previously done by EIA) and CCP to round our serological status.   -- continue monitoring per ophthamology    RTC in 1 year    Josiane Carolina MD, PhD  Rheumatology

## 2021-10-02 ENCOUNTER — LAB (OUTPATIENT)
Dept: LAB | Facility: CLINIC | Age: 21
End: 2021-10-02
Attending: STUDENT IN AN ORGANIZED HEALTH CARE EDUCATION/TRAINING PROGRAM

## 2021-10-02 ENCOUNTER — ANCILLARY PROCEDURE (OUTPATIENT)
Dept: GENERAL RADIOLOGY | Facility: CLINIC | Age: 21
End: 2021-10-02
Attending: STUDENT IN AN ORGANIZED HEALTH CARE EDUCATION/TRAINING PROGRAM
Payer: COMMERCIAL

## 2021-10-02 DIAGNOSIS — M08.3 JIA (JUVENILE IDIOPATHIC ARTHRITIS), POLYARTHRITIS, RHEUMAT FACTOR NEG (H): ICD-10-CM

## 2021-10-02 DIAGNOSIS — M08.40 JIA (JUVENILE IDIOPATHIC ARTHRITIS), OLIGOARTHRITIS, PERSISTENT (H): ICD-10-CM

## 2021-10-02 DIAGNOSIS — Z79.899 HIGH RISK MEDICATION USE: ICD-10-CM

## 2021-10-02 LAB
ALP SERPL-CCNC: 61 U/L (ref 40–150)
ALT SERPL W P-5'-P-CCNC: 49 U/L (ref 0–50)
AST SERPL W P-5'-P-CCNC: 24 U/L (ref 0–45)
BASOPHILS # BLD AUTO: 0.1 10E3/UL (ref 0–0.2)
BASOPHILS NFR BLD AUTO: 1 %
CREAT SERPL-MCNC: 0.78 MG/DL (ref 0.52–1.04)
CRP SERPL-MCNC: <2.9 MG/L (ref 0–8)
EOSINOPHIL # BLD AUTO: 0.2 10E3/UL (ref 0–0.7)
EOSINOPHIL NFR BLD AUTO: 3 %
ERYTHROCYTE [DISTWIDTH] IN BLOOD BY AUTOMATED COUNT: 12.4 % (ref 10–15)
GFR SERPL CREATININE-BSD FRML MDRD: >90 ML/MIN/1.73M2
HCT VFR BLD AUTO: 36.6 % (ref 35–47)
HGB BLD-MCNC: 12.5 G/DL (ref 11.7–15.7)
IMM GRANULOCYTES # BLD: 0 10E3/UL
IMM GRANULOCYTES NFR BLD: 0 %
LYMPHOCYTES # BLD AUTO: 1.8 10E3/UL (ref 0.8–5.3)
LYMPHOCYTES NFR BLD AUTO: 30 %
MCH RBC QN AUTO: 29.3 PG (ref 26.5–33)
MCHC RBC AUTO-ENTMCNC: 34.2 G/DL (ref 31.5–36.5)
MCV RBC AUTO: 86 FL (ref 78–100)
MONOCYTES # BLD AUTO: 0.5 10E3/UL (ref 0–1.3)
MONOCYTES NFR BLD AUTO: 8 %
NEUTROPHILS # BLD AUTO: 3.4 10E3/UL (ref 1.6–8.3)
NEUTROPHILS NFR BLD AUTO: 58 %
NRBC # BLD AUTO: 0 10E3/UL
NRBC BLD AUTO-RTO: 0 /100
PLATELET # BLD AUTO: 278 10E3/UL (ref 150–450)
RBC # BLD AUTO: 4.26 10E6/UL (ref 3.8–5.2)
WBC # BLD AUTO: 6 10E3/UL (ref 4–11)

## 2021-10-02 PROCEDURE — 86706 HEP B SURFACE ANTIBODY: CPT | Performed by: STUDENT IN AN ORGANIZED HEALTH CARE EDUCATION/TRAINING PROGRAM

## 2021-10-02 PROCEDURE — 86200 CCP ANTIBODY: CPT | Performed by: STUDENT IN AN ORGANIZED HEALTH CARE EDUCATION/TRAINING PROGRAM

## 2021-10-02 PROCEDURE — 73630 X-RAY EXAM OF FOOT: CPT | Mod: LT | Performed by: RADIOLOGY

## 2021-10-02 PROCEDURE — 82306 VITAMIN D 25 HYDROXY: CPT | Performed by: STUDENT IN AN ORGANIZED HEALTH CARE EDUCATION/TRAINING PROGRAM

## 2021-10-02 PROCEDURE — 82728 ASSAY OF FERRITIN: CPT | Performed by: STUDENT IN AN ORGANIZED HEALTH CARE EDUCATION/TRAINING PROGRAM

## 2021-10-02 PROCEDURE — 86481 TB AG RESPONSE T-CELL SUSP: CPT | Performed by: STUDENT IN AN ORGANIZED HEALTH CARE EDUCATION/TRAINING PROGRAM

## 2021-10-02 PROCEDURE — 84443 ASSAY THYROID STIM HORMONE: CPT | Performed by: STUDENT IN AN ORGANIZED HEALTH CARE EDUCATION/TRAINING PROGRAM

## 2021-10-02 PROCEDURE — 80050 GENERAL HEALTH PANEL: CPT | Performed by: PATHOLOGY

## 2021-10-02 PROCEDURE — 86038 ANTINUCLEAR ANTIBODIES: CPT | Performed by: STUDENT IN AN ORGANIZED HEALTH CARE EDUCATION/TRAINING PROGRAM

## 2021-10-02 PROCEDURE — 84460 ALANINE AMINO (ALT) (SGPT): CPT | Performed by: STUDENT IN AN ORGANIZED HEALTH CARE EDUCATION/TRAINING PROGRAM

## 2021-10-02 PROCEDURE — 83550 IRON BINDING TEST: CPT | Performed by: STUDENT IN AN ORGANIZED HEALTH CARE EDUCATION/TRAINING PROGRAM

## 2021-10-02 PROCEDURE — 86803 HEPATITIS C AB TEST: CPT | Performed by: STUDENT IN AN ORGANIZED HEALTH CARE EDUCATION/TRAINING PROGRAM

## 2021-10-02 PROCEDURE — 86704 HEP B CORE ANTIBODY TOTAL: CPT | Performed by: STUDENT IN AN ORGANIZED HEALTH CARE EDUCATION/TRAINING PROGRAM

## 2021-10-02 PROCEDURE — 36415 COLL VENOUS BLD VENIPUNCTURE: CPT | Performed by: PATHOLOGY

## 2021-10-02 PROCEDURE — 87340 HEPATITIS B SURFACE AG IA: CPT | Performed by: STUDENT IN AN ORGANIZED HEALTH CARE EDUCATION/TRAINING PROGRAM

## 2021-10-02 PROCEDURE — 73120 X-RAY EXAM OF HAND: CPT | Mod: 52 | Performed by: RADIOLOGY

## 2021-10-02 PROCEDURE — 84450 TRANSFERASE (AST) (SGOT): CPT | Performed by: STUDENT IN AN ORGANIZED HEALTH CARE EDUCATION/TRAINING PROGRAM

## 2021-10-02 PROCEDURE — 86140 C-REACTIVE PROTEIN: CPT | Performed by: PATHOLOGY

## 2021-10-02 PROCEDURE — 83735 ASSAY OF MAGNESIUM: CPT | Performed by: STUDENT IN AN ORGANIZED HEALTH CARE EDUCATION/TRAINING PROGRAM

## 2021-10-02 PROCEDURE — 82607 VITAMIN B-12: CPT | Performed by: STUDENT IN AN ORGANIZED HEALTH CARE EDUCATION/TRAINING PROGRAM

## 2021-10-03 LAB — CCP AB SER IA-ACNC: 2.6 U/ML

## 2021-10-04 LAB
GAMMA INTERFERON BACKGROUND BLD IA-ACNC: 0.05 IU/ML
HBV CORE AB SERPL QL IA: NONREACTIVE
HBV SURFACE AB SERPL IA-ACNC: 6.49 M[IU]/ML
HBV SURFACE AG SERPL QL IA: NONREACTIVE
HCV AB SERPL QL IA: NONREACTIVE
M TB IFN-G BLD-IMP: NEGATIVE
M TB IFN-G CD4+ BCKGRND COR BLD-ACNC: 9.95 IU/ML
MITOGEN IGNF BCKGRD COR BLD-ACNC: 0 IU/ML
MITOGEN IGNF BCKGRD COR BLD-ACNC: 0 IU/ML
QUANTIFERON MITOGEN: 10 IU/ML
QUANTIFERON NIL TUBE: 0.05 IU/ML
QUANTIFERON TB1 TUBE: 0.05 IU/ML
QUANTIFERON TB2 TUBE: 0.05

## 2021-10-05 LAB — ANA SER QL IF: NEGATIVE

## 2021-10-06 NOTE — PROGRESS NOTES
Colette is a 21 year old who is being evaluated via a billable video visit.      How would you like to obtain your AVS? MyChart  If the video visit is dropped, the invitation should be resent by: Text to cell phone: [phone]  Will anyone else be joining your video visit? No      Video Start Time: 4:35 PM    Assessment & Plan     Hair loss  Unclear etiology - medicine side effects vs stress/anxiety vs other etiology - will try to add labs to recent blood draw but may niles lab only appointment to add on. Trial of over the counter biotin and option for follow up with dermatology discussed with patient as well.  - Ferritin  - Iron & Iron Binding Capacity  - TSH with free T4 reflex  - Vitamin D Deficiency  - Vitamin B12  - Adult Dermatology Referral; Future  - Comprehensive metabolic panel (BMP + Alb, Alk Phos, ALT, AST, Total. Bili, TP)  - Magnesium    Bilateral calf pain  MSK vs other etiology - no red flags at this time, so would offer Physical Therapy and update labs as well; option for foam rolling and as needed magnesium discussed with patient as well. Return to clinic with any worsening or changes in symptoms or go to ER Urgent care in off hours.  - Comprehensive metabolic panel (BMP + Alb, Alk Phos, ALT, AST, Total. Bili, TP)  - Magnesium  - [facility] Referral; Future    Review of prior external note(s) from - previous routine PCP notes and rheum speciality notes  30 minutes spent on the date of the encounter doing chart review, history and exam, documentation and further activities per the note       Patient Instructions   Trial BIOTIN and/or magnesium supplements suggested.  Labs to be updated.  Option for PT and referral for dermatology placed today as well.      Did you know?      You can schedule a video visit for follow-up appointments as well as future appointments for certain conditions.  Please see the below link.     [email or web address]    If you have not already  done so,  I encourage you to sign up for Mychart ([email or web address]).  This will allow you to review your results, securely communicate with a provider, and schedule virtual visits as well.      Return in about 3 months (around 1/7/2022) for Follow up, with PCP, or sooner with worsening symptoms.    SU[facility]   Colette is a 21 year old who presents for the following health issues     HPI     Musculoskeletal problem/pain  Onset/Duration: off/on for a few years  Description  Location: calf bilateral  Joint Swelling: YES - arthritis in ankles limits ROM  Redness: no  Pain: no  Warmth: no  Intensity:  moderate  Progression of Symptoms:  waxing and waning  Accompanying signs and symptoms:   Fevers: no  Numbness/tingling/weakness: no  History  Trauma to the area: no  Recent illness:  no  Previous similar problem: no  Previous evaluation:  YES  Precipitating or alleviating factors:  Aggravating factors include: walking  Therapies tried and outcome: nothing and physical therapy  No shortness of breath, chest pain, lightheadedness, dizziness.    Labs done by rheumatology 10/2/21 - reassuring    Also discuss hair loss/thining treatment options.  Patient does take methotrexate for arthritis, changed hair texture/decreased amount.      Review of Systems   Constitutional, HEENT, cardiovascular, pulmonary, GI, , musculoskeletal, neuro, skin, endocrine and psych systems are negative, except as otherwise noted.      Objective           Vitals:  No vitals were obtained today due to virtual visit.    Physical Exam   GENERAL: Healthy, alert and no distress  EYES: Eyes grossly normal to inspection.  No discharge or erythema, or obvious scleral/conjunctival abnormalities.  RESP: No audible wheeze, cough, or visible cyanosis.  No visible retractions or increased work of breathing.    SKIN: Visible skin clear. No significant rash, abnormal pigmentation  or lesions.  NEURO: Cranial nerves grossly intact.  Mentation and speech appropriate for age.  PSYCH: Mentation appears normal, affect normal/bright, judgement and insight intact, normal speech and appearance well-groomed.            Video-Visit Details    Type of service:  Video Visit    Video End Time:4:52 PM    Originating Location (pt. Location): Home    Distant Location (provider location):  [facility]     Platform used for Video Visit: [place]

## 2021-10-07 ENCOUNTER — VIRTUAL VISIT (OUTPATIENT)
Dept: FAMILY MEDICINE | Facility: CLINIC | Age: 21
End: 2021-10-07
Payer: COMMERCIAL

## 2021-10-07 DIAGNOSIS — L65.9 HAIR LOSS: Primary | ICD-10-CM

## 2021-10-07 DIAGNOSIS — M79.662 BILATERAL CALF PAIN: ICD-10-CM

## 2021-10-07 DIAGNOSIS — M79.661 BILATERAL CALF PAIN: ICD-10-CM

## 2021-10-07 PROCEDURE — 99214 OFFICE O/P EST MOD 30 MIN: CPT | Mod: GT | Performed by: PHYSICIAN ASSISTANT

## 2021-10-07 NOTE — PATIENT INSTRUCTIONS
Trial BIOTIN and/or magnesium supplements suggested.  Labs to be updated.  Option for PT and referral for dermatology placed today as well.      Did you know?      You can schedule a video visit for follow-up appointments as well as future appointments for certain conditions.  Please see the below link.     [email or web address]    If you have not already done so,  I encourage you to sign up for [email or web address] ([email or web address]).  This will allow you to review your results, securely communicate with a provider, and schedule virtual visits as well.

## 2021-10-08 LAB
ALBUMIN SERPL-MCNC: 3.9 G/DL (ref 3.4–5)
ALP SERPL-CCNC: 64 U/L (ref 40–150)
ALT SERPL W P-5'-P-CCNC: 46 U/L (ref 0–50)
ANION GAP SERPL CALCULATED.3IONS-SCNC: 11 MMOL/L (ref 3–14)
AST SERPL W P-5'-P-CCNC: 21 U/L (ref 0–45)
BILIRUB SERPL-MCNC: 0.2 MG/DL (ref 0.2–1.3)
BUN SERPL-MCNC: 9 MG/DL (ref 7–30)
CALCIUM SERPL-MCNC: 9.1 MG/DL (ref 8.5–10.1)
CHLORIDE BLD-SCNC: 109 MMOL/L (ref 94–109)
CO2 SERPL-SCNC: 21 MMOL/L (ref 20–32)
CREAT SERPL-MCNC: 0.83 MG/DL (ref 0.52–1.04)
FERRITIN SERPL-MCNC: 37 NG/ML (ref 12–150)
GFR SERPL CREATININE-BSD FRML MDRD: >90 ML/MIN/1.73M2
GLUCOSE BLD-MCNC: 109 MG/DL (ref 70–99)
IRON SATN MFR SERPL: 26 % (ref 15–46)
IRON SERPL-MCNC: 85 UG/DL (ref 35–180)
MAGNESIUM SERPL-MCNC: 1.8 MG/DL (ref 1.6–2.3)
POTASSIUM BLD-SCNC: 4.1 MMOL/L (ref 3.4–5.3)
PROT SERPL-MCNC: 7.5 G/DL (ref 6.8–8.8)
SODIUM SERPL-SCNC: 141 MMOL/L (ref 133–144)
TIBC SERPL-MCNC: 322 UG/DL (ref 240–430)
TSH SERPL DL<=0.005 MIU/L-ACNC: 1.17 MU/L (ref 0.4–4)
VIT B12 SERPL-MCNC: 356 PG/ML (ref 193–986)

## 2021-10-09 LAB — DEPRECATED CALCIDIOL+CALCIFEROL SERPL-MC: 42 UG/L (ref 20–75)

## 2021-10-29 ENCOUNTER — OFFICE VISIT (OUTPATIENT)
Dept: OPHTHALMOLOGY | Facility: CLINIC | Age: 21
End: 2021-10-29
Attending: OPHTHALMOLOGY
Payer: COMMERCIAL

## 2021-10-29 DIAGNOSIS — H20.11 CHRONIC ANTERIOR UVEITIS OF RIGHT EYE: Primary | ICD-10-CM

## 2021-10-29 DIAGNOSIS — H20.9 UVEITIS: ICD-10-CM

## 2021-10-29 DIAGNOSIS — H40.052 BORDERLINE GLAUCOMA OF LEFT EYE WITH OCULAR HYPERTENSION: ICD-10-CM

## 2021-10-29 PROCEDURE — G0463 HOSPITAL OUTPT CLINIC VISIT: HCPCS

## 2021-10-29 PROCEDURE — 92012 INTRM OPH EXAM EST PATIENT: CPT | Performed by: OPHTHALMOLOGY

## 2021-10-29 RX ORDER — PREDNISOLONE ACETATE 10 MG/ML
1 SUSPENSION/ DROPS OPHTHALMIC 3 TIMES DAILY
Qty: 5 ML | Refills: 1 | Status: SHIPPED | OUTPATIENT
Start: 2021-10-29 | End: 2022-02-25

## 2021-10-29 ASSESSMENT — CONF VISUAL FIELD
OD_NORMAL: 1
METHOD: COUNTING FINGERS
OS_NORMAL: 1

## 2021-10-29 ASSESSMENT — TONOMETRY
OD_IOP_MMHG: 23
OS_IOP_MMHG: 19
IOP_METHOD: APPLANATION
IOP_METHOD: ICARE
OD_IOP_MMHG: 20
OS_IOP_MMHG: 27

## 2021-10-29 ASSESSMENT — VISUAL ACUITY
OS_CC: 20/20
CORRECTION_TYPE: GLASSES
METHOD: SNELLEN - LINEAR
OD_CC: 20/20

## 2021-10-29 ASSESSMENT — CUP TO DISC RATIO
OS_RATIO: 0.5
OD_RATIO: 0.5

## 2021-10-29 ASSESSMENT — EXTERNAL EXAM - LEFT EYE: OS_EXAM: NORMAL

## 2021-10-29 ASSESSMENT — SLIT LAMP EXAM - LIDS
COMMENTS: NORMAL
COMMENTS: NORMAL

## 2021-10-29 ASSESSMENT — EXTERNAL EXAM - RIGHT EYE: OD_EXAM: NORMAL

## 2021-10-29 NOTE — PROGRESS NOTES
HPI: Colette Marcos is a 21 year old female with a history of OMERO-associated anterior uveitis right eye here for routine follow up.  Using her prednisolone as prescribed - no problems.  Again today, she reports no eye changes or concerns since last visit.     Current medications: prednisolone acetate three times a day right eye, Humira q2wk (started 6.7.18). IMT prescribed by rheum: Dr. Es Arnold at [facility].    Prior medications have included various oral NSAIDs for joints and a short course of oral prednisone for joints (patient did not tolerate well, felt sick per mother's report, patient does not recall because it was so long ago). For ease of use she switched to Humira from Remicade 700 mg (~9.5 mg/kg) q4wk (Fall 2013 - 5.7.18), oral methotrexate 10 mg (was on and off methotrexate since age 8-9 years, previously on 15 mg/wk with taper starting early Jan 2019 - stopped July 20, 2019).    Ocular history:   1. 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 Joint disease (RF-negative, polyarticular) first diagnosed at age 5 years (though mother reports that she may have been symptomatic as early as age 2 years with ankle pain and swelling). Follows with Dr. Arnold ([facility] rheum) for joint disease and immunomodulatory therapy. Uveitis first diagnosed at age 8-8 years. Patient and mother report no history of uveitis left eye. Review of electronic records here (through 01/2014) shows no cell left eye.    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   Most recently had 0.5+ anterior chamber cell right eye in August 2017 (low trace, ~1 cell/hpf, was holding MTX at time of exam) and 0.5+ anterior chamber cell right eye in March 2019 (0-2 cells/hpf) and Melissa/Aug 2020 (1 cell/hpf).  2. History of cystoid macular edema right eye, s/p intravitreal corticosteroid right eye in September 2013 (Dr. Fofana).  3. Pseudophakia right eye s/p cataract extraction/IOL 12.09.14  ([facility]), s/p YAG capsulotomy 04.24.17.    Ocular Imaging:  Optos fluorescein angiography 6.25.21: no leakage in either eye    Macular OCT 8.14.20: within normal limits both eyes    Central corneal thickness 8.14.20: 571/569  RNFL OCT 8.14.20: WNL both eyes  OVF 24-2 8.14.20:  OD - reliable. Nonspecific inferior defects. MD -3.3   OS - reliable. Normal. MD 0.1    Impression/Plan:   1. OMERO-associated anterior uveitis right eye, quiescent today.   - Continue Humira q2wk   - Continue Predforte right eye three times a day    2. Ocular hypertension both eyes. Glaucoma testing 8.14.20 (morales visual field (HVF) and retinal nerve fiber layer OCT) show no abnormality.   - Intraocular pressure remains at higher end of normal   - Monitor; repeat retinal nerve fiber layer OCT next visit    3. Pseudophakia right eye, stable.   - Monitor    4. Posterior vitreous detachment (PVD) right eye, stable.   - Monitor    Follow up in 4 months, V/T/RNFL OCT, sooner with changes      Attending Physician Attestation:  Complete documentation of historical and exam elements from today's encounter can be found in the full encounter summary report (not reduplicated in this progress note).  I personally obtained the chief complaint(s) and history of present illness.  I confirmed and edited as necessary the review of systems, past medical/surgical history, family history, social history, and examination findings as documented by others; and I examined the patient myself.  I personally reviewed the relevant tests, images, and reports as documented above.  I formulated and edited as necessary the assessment and plan and discussed the findings and management plan with the patient and family.  - Yaima Maynard M.D.

## 2021-10-29 NOTE — NURSING NOTE
Chief Complaints and History of Present Illnesses   Patient presents with     Uveitis Follow-Up     Chief Complaint(s) and History of Present Illness(es)     Uveitis Follow-Up     Laterality: right eye    Onset: months ago    Associated symptoms: Negative for redness, eye pain and photophobia    Pain scale: 0/10              Comments     4 month follow up for OMERO-associated anterior uveitis right eye.   Patient states vision is stable each eye within the last few months. Denies pain, redness, or light sensitivity each eye. Patient states compliant with eye drops.     Ocular meds:   - Pred Forte TID right eye    JOLIE Arce 11:05 AM 10/29/2021

## 2021-11-03 ENCOUNTER — THERAPY VISIT (OUTPATIENT)
Dept: PHYSICAL THERAPY | Facility: CLINIC | Age: 21
End: 2021-11-03
Attending: PHYSICIAN ASSISTANT
Payer: COMMERCIAL

## 2021-11-03 DIAGNOSIS — M79.662 BILATERAL CALF PAIN: ICD-10-CM

## 2021-11-03 DIAGNOSIS — M79.661 BILATERAL CALF PAIN: ICD-10-CM

## 2021-11-03 DIAGNOSIS — M25.572 CHRONIC PAIN OF LEFT ANKLE: ICD-10-CM

## 2021-11-03 DIAGNOSIS — G89.29 CHRONIC PAIN OF LEFT ANKLE: ICD-10-CM

## 2021-11-03 DIAGNOSIS — M25.571 CHRONIC PAIN OF RIGHT ANKLE: ICD-10-CM

## 2021-11-03 DIAGNOSIS — G89.29 CHRONIC PAIN OF RIGHT ANKLE: ICD-10-CM

## 2021-11-03 PROCEDURE — 97161 PT EVAL LOW COMPLEX 20 MIN: CPT | Mod: GP | Performed by: PHYSICAL THERAPIST

## 2021-11-03 PROCEDURE — 97110 THERAPEUTIC EXERCISES: CPT | Mod: GP | Performed by: PHYSICAL THERAPIST

## 2021-11-03 NOTE — PROGRESS NOTES
[facility] Initial Evaluation     Present: no    Subjective:  Colette Marcos is a 21 year old female with a shirin calf/legs condition. Pt reports that in general she has really tight muscles. Pain in the calves after walking for short periods of time. Prior history of OMERO without symptoms for 8 years now. Prior PT for the ankles and calves. Denies vague symptoms. Would like to build up activity and prevent pain.      Symptoms commenced as a result of: unsure/other. Condition occurred in the following environment: other. Onset of symptoms: chronic. Location of symptoms: lateral calf shirin. Pain level on number scale: 8/10(at worst). Quality of pain: cramping. Associated symptoms: none. Pain frequency (constant/intermittent): intermittent. Symptoms are exacerbated by: long walks. Symptoms are relieved by: rest, avoidance, shaking the leg. Progression of symptoms since onset (same/better/worse): same. Special tests (x-ray, MRI, CT scan, EMG, bone scan): none. Previous treatment: PT. Improvement with previous treatment: some. General health as reported by patient is good. Pertinent medical history includes: See Epic. Medical allergies: see Epic. Other pertinent surgeries: see Epic. Current medications: See Epic. Occupation: . Patient is (working in normal job without restrictions/working in normal job with restrictions/working in an alternate job/not working due to present treatment problem): normal. Primary job tasks: sitting, computer work. Barriers at home/work: None reported by patient. Red flags: None reported by patient.    Objective  Gait:normal    Ankle/Foot Alignment: flat, pronated feet shirin slightly externally rotated     Screening: negative    Flexibility: gastroc/solues tight shirin      Ankle AROM/PROM (* = pain) Right Left   DF 0 2   PF 55 55   INV 35 35   EV 20 20   Great Toe EXT full full   Great Toe FL full full       Ankle Strength (* = pain) Right Left   DF 5/5 5/5    PF 4/5 4+/5   INV 5/5 5-/5   EV 5/5 5/5     Special Tests Right Left   Anterior Drawer (ATF) - -   Posterior Drawer (PTF) - -   Varus Stress (Calc Fib) - -   Valgus Stress (Deltoid) - -     Palpation Tenderness:negative    Swelling: unremarkable    Functional Squat/Balance: fair squat, fair SLS garrett     Hip ROM/Strength: Full hip ROM garrett pain free, hip abduction 4/5bil, hip extension 4+/5 garrett      Assessment/Plan:    Patient is a 21 year old female with both sides ankle complaints.    Patient has the following significant findings with corresponding treatment plan.                Diagnosis 1:  Garrett ankle/calf pain  Pain -  manual therapy, self management, education and home program  Decreased ROM/flexibility - manual therapy and therapeutic exercise  Decreased joint mobility - manual therapy and therapeutic exercise  Decreased strength - therapeutic exercise and therapeutic activities  Impaired gait - gait training  Impaired muscle performance - neuro re-education  Decreased function - therapeutic activities    Therapy Evaluation Codes:   1) History comprised of:   Personal factors that impact the plan of care:      Time since onset of symptoms.    Comorbidity factors that impact the plan of care are:      Depression and Mental illness.     Medications impacting care: Anti-depressant and Anti-inflammatory.  2) Examination of Body Systems comprised of:   Body structures and functions that impact the plan of care:      Ankle.   Activity limitations that impact the plan of care are:      Jumping, Lifting, Running, Sitting, Sports, Squatting/kneeling, Stairs, Standing and Walking.  3) Clinical presentation characteristics are:   Stable/Uncomplicated.  4) Decision-Making    Low complexity using standardized patient assessment instrument and/or measureable assessment of functional outcome.  Cumulative Therapy Evaluation is: Low complexity.    Previous and current functional limitations:  (See Goal Flow Sheet for this  information)    Short term and Long term goals: (See Goal Flow Sheet for this information)     Communication ability:  Patient appears to be able to clearly communicate and understand verbal and written communication and follow directions correctly.  Treatment Explanation - The following has been discussed with the patient:   RX ordered/plan of care  Anticipated outcomes  Possible risks and side effects  This patient would benefit from PT intervention to resume normal activities.   Rehab potential is good.    Frequency:  1 X week, once daily  Duration:  for 6 weeks  Discharge Plan:  Achieve all LTG.  Independent in home treatment program.  Reach maximal therapeutic benefit.    Please refer to the daily flowsheet for treatment today, total treatment time and time spent performing 1:1 timed codes.     Please Contact me with any questions or concerns. Thank you for for patience and cooperation.     Enoch Brown PT, DPT, CSCS  Physical Therapist  [facility] [place]  [phone]

## 2021-11-11 ENCOUNTER — THERAPY VISIT (OUTPATIENT)
Dept: PHYSICAL THERAPY | Facility: CLINIC | Age: 21
End: 2021-11-11
Payer: COMMERCIAL

## 2021-11-11 DIAGNOSIS — M25.572 CHRONIC PAIN OF LEFT ANKLE: ICD-10-CM

## 2021-11-11 DIAGNOSIS — M25.571 CHRONIC PAIN OF RIGHT ANKLE: ICD-10-CM

## 2021-11-11 DIAGNOSIS — G89.29 CHRONIC PAIN OF RIGHT ANKLE: ICD-10-CM

## 2021-11-11 DIAGNOSIS — G89.29 CHRONIC PAIN OF LEFT ANKLE: ICD-10-CM

## 2021-11-11 PROCEDURE — 97110 THERAPEUTIC EXERCISES: CPT | Mod: GP | Performed by: PHYSICAL THERAPIST

## 2021-11-23 ENCOUNTER — TELEPHONE (OUTPATIENT)
Dept: RHEUMATOLOGY | Facility: CLINIC | Age: 21
End: 2021-11-23
Payer: COMMERCIAL

## 2021-11-23 NOTE — TELEPHONE ENCOUNTER
[facility] Prior Authorization Team   Phone: [phone]  Fax: [phone]    Prior Authorization Approval    Authorization Effective Date: 10/24/2021  Authorization Expiration Date: 11/23/2022  Medication: Humira - Approved  Approved Dose/Quantity: 2  Reference #: [id number]   Insurance Company: MED[employer] - Phone [phone] Fax [phone]  Expected CoPay: 5     CoPay Card Available: Yes    Foundation Assistance Needed:    Which Pharmacy is filling the prescription (Not needed for infusion/clinic administered): [facility] - [place], [place] - [address]   Pharmacy Notified: Yes  Patient Notified: Yes

## 2021-12-15 ENCOUNTER — VIRTUAL VISIT (OUTPATIENT)
Dept: FAMILY MEDICINE | Facility: CLINIC | Age: 21
End: 2021-12-15
Payer: COMMERCIAL

## 2021-12-15 DIAGNOSIS — F43.21 ADJUSTMENT DISORDER WITH DEPRESSED MOOD: Primary | ICD-10-CM

## 2021-12-15 DIAGNOSIS — F32.81 PMDD (PREMENSTRUAL DYSPHORIC DISORDER): ICD-10-CM

## 2021-12-15 PROBLEM — M25.571 CHRONIC PAIN OF RIGHT ANKLE: Status: RESOLVED | Noted: 2021-11-03 | Resolved: 2021-12-15

## 2021-12-15 PROBLEM — G89.29 CHRONIC PAIN OF RIGHT ANKLE: Status: RESOLVED | Noted: 2021-11-03 | Resolved: 2021-12-15

## 2021-12-15 PROBLEM — G89.29 CHRONIC PAIN OF LEFT ANKLE: Status: RESOLVED | Noted: 2021-11-03 | Resolved: 2021-12-15

## 2021-12-15 PROBLEM — M25.572 CHRONIC PAIN OF LEFT ANKLE: Status: RESOLVED | Noted: 2021-11-03 | Resolved: 2021-12-15

## 2021-12-15 PROCEDURE — 99213 OFFICE O/P EST LOW 20 MIN: CPT | Mod: GT | Performed by: FAMILY MEDICINE

## 2021-12-15 RX ORDER — SERTRALINE HYDROCHLORIDE 100 MG/1
100 TABLET, FILM COATED ORAL DAILY
Qty: 90 TABLET | Refills: 3 | Status: SHIPPED | OUTPATIENT
Start: 2021-12-15 | End: 2022-12-12

## 2021-12-15 ASSESSMENT — PATIENT HEALTH QUESTIONNAIRE - PHQ9
10. IF YOU CHECKED OFF ANY PROBLEMS, HOW DIFFICULT HAVE THESE PROBLEMS MADE IT FOR YOU TO DO YOUR WORK, TAKE CARE OF THINGS AT HOME, OR GET ALONG WITH OTHER PEOPLE: VERY DIFFICULT
SUM OF ALL RESPONSES TO PHQ QUESTIONS 1-9: 10
SUM OF ALL RESPONSES TO PHQ QUESTIONS 1-9: 10

## 2021-12-15 NOTE — PROGRESS NOTES
Colette is a 21 year old who is being evaluated via a billable video visit.      How would you like to obtain your AVS? MyChart  If the video visit is dropped, the invitation should be resent by: Send to e-mail at: [email or web address]  Will anyone else be joining your video visit? No     Video Start Time: 12:53    Assessment & Plan     Adjustment disorder with depressed mood  Increased medications  reveiwed exercise talk therapy journaling and stress reduction  - sertraline (ZOLOFT) 100 MG tablet; Take 1 tablet (100 mg) by mouth daily    PMDD (premenstrual dysphoric disorder)  As noted      Depression Screening Follow Up    PHQ 12/15/2021   PHQ-9 Total Score 10   Q9: Thoughts of better off dead/self-harm past 2 weeks Not at all     Last PHQ-9 12/15/2021   1.  Little interest or pleasure in doing things 1   2.  Feeling down, depressed, or hopeless 1   3.  Trouble falling or staying asleep, or sleeping too much 2   4.  Feeling tired or having little energy 2   5.  Poor appetite or overeating 0   6.  Feeling bad about yourself 2   7.  Trouble concentrating 1   8.  Moving slowly or restless 1   Q9: Thoughts of better off dead/self-harm past 2 weeks 0   PHQ-9 Total Score 10   Difficulty at work, home, or with people -       Follow Up Actions Taken  Crisis resource information provided in After Visit Summary         Prescription drug management  15 minutes spent on the date of the encounter doing chart review, history and exam, documentation and further activities per the note        Depression Screening Follow Up    PHQ 12/15/2021   PHQ-9 Total Score 10   Q9: Thoughts of better off dead/self-harm past 2 weeks Not at all     Last PHQ-9 12/15/2021   1.  Little interest or pleasure in doing things 1   2.  Feeling down, depressed, or hopeless 1   3.  Trouble falling or staying asleep, or sleeping too much 2   4.  Feeling tired or having little energy 2   5.  Poor appetite or overeating 0   6.  Feeling bad about yourself 2   7.   Trouble concentrating 1   8.  Moving slowly or restless 1   Q9: Thoughts of better off dead/self-harm past 2 weeks 0   PHQ-9 Total Score 10   Difficulty at work, home, or with people -       Follow Up Actions Taken  Crisis resource information provided in After Visit Summary     Regular exercise    No follow-ups on file.    Yaima Jaime MD  [facility]    Kiko Alvarenga is a 21 year old who presents for the following health issues     History of Present Illness       She eats 2-3 servings of fruits and vegetables daily.She consumes 0 sweetened beverage(s) daily.She exercises with enough effort to increase her heart rate 30 to 60 minutes per day.  She exercises with enough effort to increase her heart rate 5 days per week.   She is taking medications regularly.       Depression and Anxiety Follow-Up    How are you doing with your depression since your last visit? Worsened     Really bad in 2 weeks before her period thinks this has been present for a long time - really noticed this in the spring - more anxious leading up to period  Sees a therapist  Exercises nearly daily  Feels meds are helping but just not with her periods      How are you doing with your anxiety since your last visit?  Worsened     Are you having other symptoms that might be associated with depression or anxiety? No    Have you had a significant life event? No     Do you have any concerns with your use of alcohol or other drugs? No    Social History     Tobacco Use     Smoking status: Never Smoker     Smokeless tobacco: Never Used   Substance Use Topics     Alcohol use: No     Alcohol/week: 0.0 standard drinks     Drug use: No     PHQ 8/16/2021 9/13/2021 12/13/2021   PHQ-9 Total Score 9 7 9   Q9: Thoughts of better off dead/self-harm past 2 weeks Not at all Not at all Not at all     SAMEER-7 SCORE 8/16/2021 9/13/2021 12/13/2021   Total Score 16 (severe anxiety) 8 (mild anxiety) 7 (mild anxiety)   Total Score 16 8 7      Last PHQ-9 12/13/2021   1.  Little interest or pleasure in doing things 1   2.  Feeling down, depressed, or hopeless 1   3.  Trouble falling or staying asleep, or sleeping too much 1   4.  Feeling tired or having little energy 2   5.  Poor appetite or overeating 1   6.  Feeling bad about yourself 2   7.  Trouble concentrating 1   8.  Moving slowly or restless 0   Q9: Thoughts of better off dead/self-harm past 2 weeks 0   PHQ-9 Total Score 9   Difficulty at work, home, or with people -     SAMEER-7  12/13/2021   1. Feeling nervous, anxious, or on edge 1   2. Not being able to stop or control worrying 1   3. Worrying too much about different things 1   4. Trouble relaxing 1   5. Being so restless that it is hard to sit still 1   6. Becoming easily annoyed or irritable 2   7. Feeling afraid, as if something awful might happen 0   SAMEER-7 Total Score 7       Suicide Assessment Five-step Evaluation and Treatment (SAFE-T)       How many servings of fruits and vegetables do you eat daily?  2-3    On average, how many sweetened beverages do you drink each day (Examples: soda, juice, sweet tea, etc.  Do NOT count diet or artificially sweetened beverages)?   0    How many days per week do you exercise enough to make your heart beat faster? 5    How many minutes a day do you exercise enough to make your heart beat faster? 30 - 60    How many days per week do you miss taking your medication? 0        Review of Systems   Constitutional, HEENT, cardiovascular, pulmonary, gi and gu systems are negative, except as otherwise noted.      Objective           Vitals:  No vitals were obtained today due to virtual visit.    Physical Exam   GENERAL: Healthy, alert and no distress  EYES: Eyes grossly normal to inspection.  No discharge or erythema, or obvious scleral/conjunctival abnormalities.  RESP: No audible wheeze, cough, or visible cyanosis.  No visible retractions or increased work of breathing.    SKIN: Visible skin clear. No  significant rash, abnormal pigmentation or lesions.  NEURO: Cranial nerves grossly intact.  Mentation and speech appropriate for age.  PSYCH: Mentation appears normal, affect normal/bright, judgement and insight intact, normal speech and appearance well-groomed.                Video-Visit Details    Type of service:  Video Visit    Video End Time:1:01    Originating Location (pt. Location): Home    Distant Location (provider location):  [facility]     Platform used for Video Visit: Doyenz  Answers for HPI/ROS submitted by the patient on 12/15/2021  If you checked off any problems, how difficult have these problems made it for you to do your work, take care of things at home, or get along with other people?: Very difficult  PHQ9 TOTAL SCORE: 10

## 2021-12-15 NOTE — PROGRESS NOTES
DISCHARGE SUMMARY    Colette Marcos was seen 2 times for evaluation and treatment.  Patient did not return for further treatment and current status is unknown.  Due to short treatment duration, no objective or functional changes were made.  Please see goal flow sheet from episode noted date below and initial evaluation for further information.  Patient is discharged from therapy and therapy episode is resolved as of 12/15/21.      Linked Episodes   Type: Episode: Status: Noted: Resolved: Last update: Updated by:   PHYSICAL THERAPY Garrett calf/legs 11/3/21 Active 11/3/2021  11/11/2021  4:38 PM Enoch Brown PT      Comments:     Please Contact me with any questions or concerns. Thank you for for patience and cooperation.     Enoch Brown PT, DPT, [facility]  Physical Therapist  [facility] [place]  [phone]

## 2021-12-16 ASSESSMENT — PATIENT HEALTH QUESTIONNAIRE - PHQ9: SUM OF ALL RESPONSES TO PHQ QUESTIONS 1-9: 10

## 2021-12-30 ENCOUNTER — MYC MEDICAL ADVICE (OUTPATIENT)
Dept: FAMILY MEDICINE | Facility: CLINIC | Age: 21
End: 2021-12-30
Payer: COMMERCIAL

## 2022-01-04 PROBLEM — R87.610 ATYPICAL SQUAMOUS CELLS OF UNDETERMINED SIGNIFICANCE (ASCUS) ON PAPANICOLAOU SMEAR OF CERVIX: Status: ACTIVE | Noted: 2021-07-19

## 2022-02-25 ENCOUNTER — OFFICE VISIT (OUTPATIENT)
Dept: OPHTHALMOLOGY | Facility: CLINIC | Age: 22
End: 2022-02-25
Attending: OPHTHALMOLOGY
Payer: COMMERCIAL

## 2022-02-25 DIAGNOSIS — H40.052 BORDERLINE GLAUCOMA OF LEFT EYE WITH OCULAR HYPERTENSION: ICD-10-CM

## 2022-02-25 DIAGNOSIS — H20.11 CHRONIC ANTERIOR UVEITIS OF RIGHT EYE: Primary | ICD-10-CM

## 2022-02-25 DIAGNOSIS — H20.9 UVEITIS: ICD-10-CM

## 2022-02-25 PROCEDURE — G0463 HOSPITAL OUTPT CLINIC VISIT: HCPCS | Mod: 25

## 2022-02-25 PROCEDURE — 92133 CPTRZD OPH DX IMG PST SGM ON: CPT | Performed by: OPHTHALMOLOGY

## 2022-02-25 PROCEDURE — 99214 OFFICE O/P EST MOD 30 MIN: CPT | Performed by: OPHTHALMOLOGY

## 2022-02-25 RX ORDER — PREDNISOLONE ACETATE 10 MG/ML
1 SUSPENSION/ DROPS OPHTHALMIC 3 TIMES DAILY
Qty: 5 ML | Refills: 2 | Status: SHIPPED | OUTPATIENT
Start: 2022-02-25 | End: 2022-08-26

## 2022-02-25 ASSESSMENT — SLIT LAMP EXAM - LIDS
COMMENTS: NORMAL
COMMENTS: NORMAL

## 2022-02-25 ASSESSMENT — VISUAL ACUITY
CORRECTION_TYPE: GLASSES
METHOD: SNELLEN - LINEAR
OS_CC: 20/20
OD_CC: 20/20

## 2022-02-25 ASSESSMENT — CONF VISUAL FIELD
OD_NORMAL: 1
METHOD: COUNTING FINGERS
OS_NORMAL: 1

## 2022-02-25 ASSESSMENT — TONOMETRY
IOP_METHOD: ICARE
OD_IOP_MMHG: 21
OS_IOP_MMHG: 21

## 2022-02-25 ASSESSMENT — CUP TO DISC RATIO
OD_RATIO: 0.5
OS_RATIO: 0.5

## 2022-02-25 ASSESSMENT — EXTERNAL EXAM - LEFT EYE: OS_EXAM: NORMAL

## 2022-02-25 ASSESSMENT — EXTERNAL EXAM - RIGHT EYE: OD_EXAM: NORMAL

## 2022-02-25 NOTE — PROGRESS NOTES
HPI: Colette Marcos is a 21 year old female with a history of OMERO-associated anterior uveitis right eye here for routine follow up.  Using prednisolone acetate and Humira as prescribed - no problems.  Again today, she reports no eye changes or concerns since last visit.     Current medications: prednisolone acetate three times a day right eye, Humira q2wk (started 6.7.18). IMT prescribed by rheum: Dr. Es Arnold at [facility].    Prior medications have included various oral NSAIDs for joints and a short course of oral prednisone for joints (patient did not tolerate well, felt sick per mother's report, patient does not recall because it was so long ago). For ease of use she switched to Humira from Remicade 700 mg (~9.5 mg/kg) q4wk (Fall 2013 - 5.7.18), oral methotrexate 10 mg (was on and off methotrexate since age 8-9 years, previously on 15 mg/wk with taper starting early Jan 2019 - stopped July 20, 2019).    Ocular history:   1. 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 Joint disease (RF-negative, polyarticular) first diagnosed at age 5 years (though mother reports that she may have been symptomatic as early as age 2 years with ankle pain and swelling). Follows with Dr. Arnold (peds rheum) for joint disease and immunomodulatory therapy. Uveitis first diagnosed at age 8-9 years. Patient and mother report no history of uveitis left eye. Review of electronic records here (through 01/2014) shows no cell left eye.    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   Most recently had 0.5+ anterior chamber cell right eye in August 2017 (low trace, ~1 cell/hpf, was holding MTX at time of exam) and 0.5+ anterior chamber cell right eye in March 2019 (0-2 cells/hpf) and Melissa/Aug 2020 (1 cell/hpf).  2. History of cystoid macular edema right eye, s/p intravitreal corticosteroid right eye in September 2013 (Dr. Fofana).  3. Pseudophakia right eye s/p cataract  extraction/IOL 12.09.14 (Bothun), s/p YAG capsulotomy 04.24.17.    Ocular Imaging:  Optos fluorescein angiography 6.25.21: no leakage in either eye    Macular OCT 8.14.20: within normal limits both eyes    Central corneal thickness 8.14.20: 571/569  RNFL OCT 2.25.21 within normal limits and stable vs 8.14.20 both eyes  OVF 24-2 8.14.20:  OD - reliable. Nonspecific inferior defects. MD -3.3   OS - reliable. Normal. MD 0.1    Impression/Plan:   1. OMERO-associated anterior uveitis right eye, quiescent today.   - Continue Humira q2wk   - Continue Predforte right eye three times a day    2. Ocular hypertension both eyes. Glaucoma testing 8.14.20 (morales visual field (HVF) and retinal nerve fiber layer OCT) and repeat retinal nerve fiber layer OCT 2.25.22 show no abnormality.   - Intraocular pressure remains at higher end of normal, symmetric   - Monitor    3. Pseudophakia right eye, stable.   - Monitor    4. Posterior vitreous detachment (PVD) right eye, stable.   - Monitor    Follow up in 6 months, V/T, sooner with changes      Attending Physician Attestation:  Complete documentation of historical and exam elements from today's encounter can be found in the full encounter summary report (not reduplicated in this progress note).  I personally obtained the chief complaint(s) and history of present illness.  I confirmed and edited as necessary the review of systems, past medical/surgical history, family history, social history, and examination findings as documented by others; and I examined the patient myself.  I personally reviewed the relevant tests, images, and reports as documented above.  I formulated and edited as necessary the assessment and plan and discussed the findings and management plan with the patient and family.  - Yaima Maynard M.D.

## 2022-02-25 NOTE — NURSING NOTE
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   Patient states vision is good each eye with glasses. Denies changes in vision each eye in the last few months. Denies eye pain.     Ocular meds:   - Pred Forte TID right eye     - Humprashant Q2wks     JOLIE Arce 11:22 AM 02/25/2022

## 2022-07-21 ASSESSMENT — ENCOUNTER SYMPTOMS
PARESTHESIAS: 0
NERVOUS/ANXIOUS: 0
FREQUENCY: 0
CONSTIPATION: 0
DIZZINESS: 0
COUGH: 0
NAUSEA: 0
SHORTNESS OF BREATH: 0
ABDOMINAL PAIN: 0
EYE PAIN: 0
FEVER: 0
MYALGIAS: 0
JOINT SWELLING: 0
CHILLS: 0
ARTHRALGIAS: 0
DYSURIA: 0
BREAST MASS: 0
PALPITATIONS: 0
HEARTBURN: 0
HEADACHES: 0
DIARRHEA: 0
SORE THROAT: 0
WEAKNESS: 0
HEMATOCHEZIA: 0
HEMATURIA: 0

## 2022-07-21 ASSESSMENT — PATIENT HEALTH QUESTIONNAIRE - PHQ9
SUM OF ALL RESPONSES TO PHQ QUESTIONS 1-9: 7
10. IF YOU CHECKED OFF ANY PROBLEMS, HOW DIFFICULT HAVE THESE PROBLEMS MADE IT FOR YOU TO DO YOUR WORK, TAKE CARE OF THINGS AT HOME, OR GET ALONG WITH OTHER PEOPLE: SOMEWHAT DIFFICULT
SUM OF ALL RESPONSES TO PHQ QUESTIONS 1-9: 7

## 2022-07-28 ENCOUNTER — OFFICE VISIT (OUTPATIENT)
Dept: FAMILY MEDICINE | Facility: CLINIC | Age: 22
End: 2022-07-28
Payer: COMMERCIAL

## 2022-07-28 VITALS
SYSTOLIC BLOOD PRESSURE: 131 MMHG | DIASTOLIC BLOOD PRESSURE: 81 MMHG | HEART RATE: 80 BPM | HEIGHT: 64 IN | BODY MASS INDEX: 35.48 KG/M2 | WEIGHT: 207.8 LBS | TEMPERATURE: 98.7 F | OXYGEN SATURATION: 97 %

## 2022-07-28 DIAGNOSIS — Z00.00 ROUTINE GENERAL MEDICAL EXAMINATION AT A HEALTH CARE FACILITY: Primary | ICD-10-CM

## 2022-07-28 DIAGNOSIS — R87.9 ABNORMAL VAGINAL FLUIDS: ICD-10-CM

## 2022-07-28 DIAGNOSIS — Z23 NEED FOR COVID-19 VACCINE: ICD-10-CM

## 2022-07-28 DIAGNOSIS — R74.01 ELEVATED ALT MEASUREMENT: ICD-10-CM

## 2022-07-28 DIAGNOSIS — J35.1 ENLARGED TONSILS: ICD-10-CM

## 2022-07-28 DIAGNOSIS — Z12.4 CERVICAL CANCER SCREENING: ICD-10-CM

## 2022-07-28 DIAGNOSIS — R06.83 SNORING: ICD-10-CM

## 2022-07-28 LAB
CLUE CELLS: PRESENT
HBA1C MFR BLD: 5.5 % (ref 0–5.6)
TRICHOMONAS, WET PREP: ABNORMAL
WBC'S/HIGH POWER FIELD, WET PREP: ABNORMAL
YEAST, WET PREP: ABNORMAL

## 2022-07-28 PROCEDURE — 83036 HEMOGLOBIN GLYCOSYLATED A1C: CPT | Performed by: FAMILY MEDICINE

## 2022-07-28 PROCEDURE — 0064A COVID-19,PF,MODERNA (18+ YRS BOOSTER .25ML): CPT | Performed by: FAMILY MEDICINE

## 2022-07-28 PROCEDURE — 84443 ASSAY THYROID STIM HORMONE: CPT | Performed by: FAMILY MEDICINE

## 2022-07-28 PROCEDURE — 87210 SMEAR WET MOUNT SALINE/INK: CPT | Performed by: FAMILY MEDICINE

## 2022-07-28 PROCEDURE — G0145 SCR C/V CYTO,THINLAYER,RESCR: HCPCS | Performed by: FAMILY MEDICINE

## 2022-07-28 PROCEDURE — 91306 COVID-19,PF,MODERNA (18+ YRS BOOSTER .25ML): CPT | Performed by: FAMILY MEDICINE

## 2022-07-28 PROCEDURE — 99395 PREV VISIT EST AGE 18-39: CPT | Mod: 25 | Performed by: FAMILY MEDICINE

## 2022-07-28 PROCEDURE — 80053 COMPREHEN METABOLIC PANEL: CPT | Performed by: FAMILY MEDICINE

## 2022-07-28 PROCEDURE — 36415 COLL VENOUS BLD VENIPUNCTURE: CPT | Performed by: FAMILY MEDICINE

## 2022-07-28 ASSESSMENT — ENCOUNTER SYMPTOMS
FEVER: 0
NERVOUS/ANXIOUS: 0
SHORTNESS OF BREATH: 0
HEARTBURN: 0
PALPITATIONS: 0
WEAKNESS: 0
COUGH: 0
HEADACHES: 0
PARESTHESIAS: 0
FREQUENCY: 0
HEMATURIA: 0
SORE THROAT: 0
CHILLS: 0
ARTHRALGIAS: 0
DIZZINESS: 0
DYSURIA: 0
CONSTIPATION: 0
DIARRHEA: 0
EYE PAIN: 0
NAUSEA: 0
HEMATOCHEZIA: 0
JOINT SWELLING: 0
BREAST MASS: 0
ABDOMINAL PAIN: 0
MYALGIAS: 0

## 2022-07-28 NOTE — PROGRESS NOTES
SUBJECTIVE:   CC: Colette Marcos is an 22 year old woman who presents for preventive health visit.         Healthy Habits:     Getting at least 3 servings of Calcium per day:  Yes    Bi-annual eye exam:  Yes    Dental care twice a year:  Yes    Sleep apnea or symptoms of sleep apnea:  Excessive snoring    Diet:  Gluten-free/reduced    Frequency of exercise:  2-3 days/week    Duration of exercise:  45-60 minutes    Taking medications regularly:  Yes    Medication side effects:  None    PHQ-2 Total Score: 1    Additional concerns today:  Yes    Answers for HPI/ROS submitted by the patient on 7/21/2022  If you checked off any problems, how difficult have these problems made it for you to do your work, take care of things at home, or get along with other people?: Somewhat difficult  PHQ9 TOTAL SCORE: 7          PT will get Pap smear and would like to discuss about removing tonsils     Today's PHQ-2 Score:   PHQ-2 ( 1999 [employer]) 7/21/2022   Q1: Little interest or pleasure in doing things 1   Q2: Feeling down, depressed or hopeless 0   PHQ-2 Score 1   PHQ-2 Total Score (12-17 Years)- Positive if 3 or more points; Administer PHQ-A if positive -   Q1: Little interest or pleasure in doing things Several days   Q2: Feeling down, depressed or hopeless Not at all   PHQ-2 Score 1       Abuse: Current or Past (Physical, Sexual or Emotional) - No  Do you feel safe in your environment? Yes        Social History     Tobacco Use     Smoking status: Never Smoker     Smokeless tobacco: Never Used   Substance Use Topics     Alcohol use: No     Alcohol/week: 0.0 standard drinks     If you drink alcohol do you typically have >3 drinks per day or >7 drinks per week? No    Alcohol Use 7/21/2022   Prescreen: >3 drinks/day or >7 drinks/week? No   Prescreen: >3 drinks/day or >7 drinks/week? -   No flowsheet data found.    Reviewed orders with patient.  Reviewed health maintenance and updated orders accordingly - Yes      Breast Cancer  Screening:        History of abnormal Pap smear: yes -see reports  PAP / HPV Latest Ref Rng & Units 7/19/2021   PAP   Atypical squamous cells of undetermined significance (ASC-US)(A)     Reviewed and updated as needed this visit by clinical staff                    Reviewed and updated as needed this visit by Provider                   Past Medical History:   Diagnosis Date     Abnormal Pap smear of cervix 07/19/2021     OMERO (juvenile idiopathic arthritis), oligoarthritis, extended (H) 06/07/2012     OMERO (juvenile idiopathic arthritis), oligoarthritis, persistent (H) 06/07/2012     Nonsenile cataract      Uveitis       Past Surgical History:   Procedure Laterality Date     CATARACT IOL, RT/LT       EXAM UNDER ANESTHESIA EYE(S) Bilateral 12/9/2014    Procedure: EXAM UNDER ANESTHESIA EYE(S);  Surgeon: Ramon Manuel MD;  [facility]     INJECT STEROID (LOCATION)       LENSECTOMY CHILD Right 12/9/2014    Procedure: LENSECTOMY CHILD;  Surgeon: Ramon Manuel MD;  [facility]       Review of Systems   Constitutional: Negative for chills and fever.   HENT: Negative for congestion, ear pain, hearing loss and sore throat.    Eyes: Negative for pain and visual disturbance.   Respiratory: Negative for cough and shortness of breath.    Cardiovascular: Negative for chest pain, palpitations and peripheral edema.   Gastrointestinal: Negative for abdominal pain, constipation, diarrhea, heartburn, hematochezia and nausea.   Breasts:  Negative for tenderness, breast mass and discharge.   Genitourinary: Negative for dysuria, frequency, genital sores, hematuria, pelvic pain, urgency, vaginal bleeding and vaginal discharge.   Musculoskeletal: Negative for arthralgias, joint swelling and myalgias.   Skin: Negative for rash.   Neurological: Negative for dizziness, weakness, headaches and paresthesias.   Psychiatric/Behavioral: Negative for mood changes. The patient is not nervous/anxious.           OBJECTIVE:   /81    "Pulse 80   Temp 98.7  F (37.1  C) (Temporal)   Ht 1.63 m (5' 4.17\")   Wt 94.3 kg (207 lb 12.8 oz)   LMP 07/10/2022   SpO2 97%   BMI 35.48 kg/m    Physical Exam  GENERAL: healthy, alert and no distress  EYES: Eyes grossly normal to inspection, PERRL and conjunctivae and sclerae normal  HENT: ear canals and TM's normal, nose and mouth without ulcers or lesions  Significantly enlarged tonsils 2-3+ bilateral  NECK: no adenopathy, no asymmetry, masses, or scars and thyroid normal to palpation  RESP: lungs clear to auscultation - no rales, rhonchi or wheezes  BREAST: normal without masses, tenderness or nipple discharge and no palpable axillary masses or adenopathy  CV: regular rate and rhythm, normal S1 S2, no S3 or S4, no murmur, click or rub, no peripheral edema and peripheral pulses strong  ABDOMEN: soft, nontender, no hepatosplenomegaly, no masses and bowel sounds normal   (female): normal female external genitalia, normal urethral meatus , vaginal mucosa pink, moist, well rugated, vaginal discharge - moderate, white and thin and normal cervix, adnexae, and uterus without masses.  MS: no gross musculoskeletal defects noted, no edema  SKIN: no suspicious lesions or rashes  NEURO: Normal strength and tone, mentation intact and speech normal  PSYCH: mentation appears normal, affect normal/bright    Diagnostic Test Results:  Labs reviewed in Epic    ASSESSMENT/PLAN:   (Z00.00) Routine general medical examination at a health care facility  (primary encounter diagnosis)  Comment:   Plan:     (Z12.4) Cervical cancer screening  Comment:   Plan: Pap Screen only - recommended age 21 - 24 years            (Z23) Need for COVID-19 vaccine  Comment:   Plan: COVID-19,PF,MODERNA (18+ YRS BOOSTER .25ML)            (R87.9) Abnormal vaginal fluids  Comment:   Plan: Wet prep - Clinic Collect        Will offer  treatment based on results    (Z68.35) BMI 35.0-35.9,adult  Comment: Plan: Hemoglobin A1c, Comprehensive metabolic panel " "        (BMP + Alb, Alk Phos, ALT, AST, Total. Bili,         TP), TSH with free T4 reflex, Hepatic panel         (Albumin, ALT, AST, Bili, Alk Phos, TP)            (R06.83) Snoring  Comment:   Plan: Adult ENT  Referral            (J35.1) Enlarged tonsils  Comment:   Plan: Adult ENT  Referral            (R74.01) Elevated ALT measurement  Comment: repeat labs encouraged  Plan: Hepatic panel (Albumin, ALT, AST, Bili, Alk         Phos, TP)              Patient has been advised of split billing requirements and indicates understanding: Yes    COUNSELING:  Reviewed preventive health counseling, as reflected in patient instructions       Regular exercise       Healthy diet/nutrition    Estimated body mass index is 32.39 kg/m  as calculated from the following:    Height as of 9/27/21: 1.626 m (5' 4\").    Weight as of 9/27/21: 85.6 kg (188 lb 11.2 oz).    Weight management plan: Discussed healthy diet and exercise guidelines    She reports that she has never smoked. She has never used smokeless tobacco.      Counseling Resources:  ATP IV Guidelines  Pooled Cohorts Equation Calculator  Breast Cancer Risk Calculator  BRCA-Related Cancer Risk Assessment: FHS-7 Tool  FRAX Risk Assessment  ICSI Preventive Guidelines  Dietary Guidelines for Americans, 2010  USDA's MyPlate  ASA Prophylaxis  Lung CA Screening    Yaima Jaime MD  [facility]  "

## 2022-07-29 LAB
ALBUMIN SERPL-MCNC: 3.9 G/DL (ref 3.4–5)
ALP SERPL-CCNC: 69 U/L (ref 40–150)
ALT SERPL W P-5'-P-CCNC: 81 U/L (ref 0–50)
ANION GAP SERPL CALCULATED.3IONS-SCNC: 5 MMOL/L (ref 3–14)
AST SERPL W P-5'-P-CCNC: 37 U/L (ref 0–45)
BILIRUB SERPL-MCNC: 0.2 MG/DL (ref 0.2–1.3)
BUN SERPL-MCNC: 8 MG/DL (ref 7–30)
CALCIUM SERPL-MCNC: 8.9 MG/DL (ref 8.5–10.1)
CHLORIDE BLD-SCNC: 104 MMOL/L (ref 94–109)
CO2 SERPL-SCNC: 28 MMOL/L (ref 20–32)
CREAT SERPL-MCNC: 0.7 MG/DL (ref 0.52–1.04)
GFR SERPL CREATININE-BSD FRML MDRD: >90 ML/MIN/1.73M2
GLUCOSE BLD-MCNC: 114 MG/DL (ref 70–99)
POTASSIUM BLD-SCNC: 3.9 MMOL/L (ref 3.4–5.3)
PROT SERPL-MCNC: 7.9 G/DL (ref 6.8–8.8)
SODIUM SERPL-SCNC: 137 MMOL/L (ref 133–144)
TSH SERPL DL<=0.005 MIU/L-ACNC: 2.33 MU/L (ref 0.4–4)

## 2022-08-01 NOTE — RESULT ENCOUNTER NOTE
Hello,    The labs show that you have bacterial vaginosis - this is an imbalance of normal vaginal bacteria.  I will send in a medication for this to take and it should clear.  Eating yogurt, or fermented foods can he re-balance this.    The labs also show that the liver enzyme ALT is a little high.  This can happen with weight gain and sometime with medication.   Let's see if we can have you boost exercise and work on even just 5# weight loss since this can help reduce the numbers.  We can repeat this lab in about 3-4 months.  If you have nausea or abdominal pain then we should check an ultrasound.  Please let me know if this happens      Yaima Jaime MD

## 2022-08-02 LAB
BKR LAB AP GYN ADEQUACY: NORMAL
BKR LAB AP GYN INTERPRETATION: NORMAL
BKR LAB AP HPV REFLEX: NO
BKR LAB AP PREVIOUS ABNL DX: NORMAL
BKR LAB AP PREVIOUS ABNORMAL: NORMAL
PATH REPORT.COMMENTS IMP SPEC: NORMAL
PATH REPORT.COMMENTS IMP SPEC: NORMAL
PATH REPORT.RELEVANT HX SPEC: NORMAL

## 2022-08-11 ENCOUNTER — PATIENT OUTREACH (OUTPATIENT)
Dept: FAMILY MEDICINE | Facility: CLINIC | Age: 22
End: 2022-08-11

## 2022-08-16 ENCOUNTER — TELEPHONE (OUTPATIENT)
Dept: RHEUMATOLOGY | Facility: CLINIC | Age: 22
End: 2022-08-16

## 2022-08-16 DIAGNOSIS — M08.3 JIA (JUVENILE IDIOPATHIC ARTHRITIS), POLYARTHRITIS, RHEUMAT FACTOR NEG (H): Primary | ICD-10-CM

## 2022-08-21 ENCOUNTER — HEALTH MAINTENANCE LETTER (OUTPATIENT)
Age: 22
End: 2022-08-21

## 2022-08-22 ENCOUNTER — MYC MEDICAL ADVICE (OUTPATIENT)
Dept: FAMILY MEDICINE | Facility: CLINIC | Age: 22
End: 2022-08-22

## 2022-08-22 DIAGNOSIS — B96.89 BV (BACTERIAL VAGINOSIS): Primary | ICD-10-CM

## 2022-08-22 DIAGNOSIS — N76.0 BV (BACTERIAL VAGINOSIS): Primary | ICD-10-CM

## 2022-08-23 NOTE — TELEPHONE ENCOUNTER
SN/DOD,  Please see below NextNinehart message and advise.  Note from 7/28 incomplete, so sign of prescription sent.  Thanks,  Judith BAPTISTE RN

## 2022-08-23 NOTE — TELEPHONE ENCOUNTER
I would see if she's okay with waiting until SN's back next week since it's been almost a month since the exam/test.  I tend to just treat if sx's and a positive wet prep, so would be wary of sending in rx with no sx's myself.  In the meantime, could give SN's other recs from her result note--    'Hello,     The labs show that you have bacterial vaginosis - this is an imbalance of normal vaginal bacteria.  I will send in a medication for this to take and it should clear.  Eating yogurt, or fermented foods can he re-balance this.'    Thanks!  CW

## 2022-08-26 ENCOUNTER — OFFICE VISIT (OUTPATIENT)
Dept: OPHTHALMOLOGY | Facility: CLINIC | Age: 22
End: 2022-08-26
Attending: OPHTHALMOLOGY
Payer: COMMERCIAL

## 2022-08-26 DIAGNOSIS — H40.052 BORDERLINE GLAUCOMA OF LEFT EYE WITH OCULAR HYPERTENSION: ICD-10-CM

## 2022-08-26 DIAGNOSIS — H20.11 CHRONIC ANTERIOR UVEITIS OF RIGHT EYE: Primary | ICD-10-CM

## 2022-08-26 DIAGNOSIS — H20.9 UVEITIS: ICD-10-CM

## 2022-08-26 PROCEDURE — G0463 HOSPITAL OUTPT CLINIC VISIT: HCPCS

## 2022-08-26 PROCEDURE — 99214 OFFICE O/P EST MOD 30 MIN: CPT | Performed by: OPHTHALMOLOGY

## 2022-08-26 RX ORDER — PREDNISOLONE ACETATE 10 MG/ML
1 SUSPENSION/ DROPS OPHTHALMIC 3 TIMES DAILY
Qty: 5 ML | Refills: 2 | Status: SHIPPED | OUTPATIENT
Start: 2022-08-26 | End: 2023-03-10

## 2022-08-26 ASSESSMENT — EXTERNAL EXAM - RIGHT EYE: OD_EXAM: NORMAL

## 2022-08-26 ASSESSMENT — TONOMETRY
OD_IOP_MMHG: 21
IOP_METHOD: TONOPEN
OS_IOP_MMHG: 22

## 2022-08-26 ASSESSMENT — VISUAL ACUITY
OD_PH_SC: 20/20
OD_SC: 20/30
OS_PH_SC: 20/20
OS_SC: 20/30
METHOD: SNELLEN - LINEAR

## 2022-08-26 ASSESSMENT — CUP TO DISC RATIO
OD_RATIO: 0.5
OS_RATIO: 0.5

## 2022-08-26 ASSESSMENT — SLIT LAMP EXAM - LIDS
COMMENTS: NORMAL
COMMENTS: NORMAL

## 2022-08-26 ASSESSMENT — CONF VISUAL FIELD
OS_NORMAL: 1
OD_NORMAL: 1

## 2022-08-26 ASSESSMENT — EXTERNAL EXAM - LEFT EYE: OS_EXAM: NORMAL

## 2022-08-26 NOTE — NURSING NOTE
Chief Complaints and History of Present Illnesses   Patient presents with     Uveitis Follow-Up     6 month follow up uveitis   Pt states feel stable since last visit  Prednisolone qid RE  Jennifer Winchester COA 11:13 AM August 26, 2022        Chief Complaint(s) and History of Present Illness(es)     Uveitis Follow-Up     Laterality: both eyes    Associated symptoms: Negative for eye pain, pain with eye movement, flashes and floaters    Treatments tried: eye drops    Pain scale: 0/10    Comments: 6 month follow up uveitis   Pt states feel stable since last visit  Prednisolone shaheend RE  Jennifer Winchester COA 11:13 AM August 26, 2022

## 2022-08-26 NOTE — PROGRESS NOTES
HPI: Colette Marcos is a 22 year old female with a history of OMERO-associated anterior uveitis right eye here for routine follow up, 6 month. Using prednisolone acetate and Humira as prescribed - no problems. No eye changes or concerns since last visit. She has an upcoming visit scheduled next month with rheum, was having knee pain earlier this month that now has resolved.    Current medications: prednisolone acetate three times a day right eye, Humira q2wk (started 6.7.18). IMT prescribed by rheum: Dr. Carolina at [facility].    Prior medications have included various oral NSAIDs for joints and a short course of oral prednisone for joints (patient did not tolerate well, felt sick per mother's report, patient does not recall because it was so long ago). For ease of use she switched to Humira from Remicade 700 mg (~9.5 mg/kg) q4wk (Fall 2013 - 5.7.18), oral methotrexate 10 mg (was on and off methotrexate since age 8-9 years, previously on 15 mg/wk with taper starting early Jan 2019 - stopped July 20, 2019).    Ocular history:   1. 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 Joint disease (RF-negative, polyarticular) first diagnosed at age 5 years (though mother reports that she may have been symptomatic as early as age 2 years with ankle pain and swelling). Follows with Dr. Arnold ([facility] rheum) for joint disease and immunomodulatory therapy. Uveitis first diagnosed at age 8-9 years. Patient and mother report no history of uveitis left eye. Review of electronic records here (through 01/2014) shows no cell left eye.    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   Most recently had 0.5+ anterior chamber cell right eye in August 2017 (low trace, ~1 cell/hpf, was holding MTX at time of exam) and 0.5+ anterior chamber cell right eye in March 2019 (0-2 cells/hpf) and Melissa/Aug 2020 (1 cell/hpf).  2. History of cystoid macular edema right eye, s/p intravitreal  corticosteroid right eye in September 2013 (Dr. Fofana).  3. Pseudophakia right eye s/p cataract extraction/IOL 12.09.14 ([facility]), s/p YAG capsulotomy 04.24.17.    Ocular Imaging:  Optos fluorescein angiography 6.25.21: no leakage in either eye    Macular OCT 8.14.20: within normal limits both eyes    Central corneal thickness 8.14.20: 571/569  RNFL OCT 2.25.21 within normal limits and stable vs 8.14.20 both eyes  OVF 24-2 8.14.20:  OD - reliable. Nonspecific inferior defects. MD -3.3   OS - reliable. Normal. MD 0.1    Impression/Plan:   1. OMERO-associated anterior uveitis right eye, quiescent today.   - Continue Humira q2wk   - Continue Predforte right eye three times a day    2. Ocular hypertension both eyes. Glaucoma testing 8.14.20 (morales visual field (HVF) and retinal nerve fiber layer OCT) and repeat retinal nerve fiber layer OCT 2.25.22 show no abnormality.   - Intraocular pressure remains at higher end of normal, symmetric despite Predforte only in right eye   - Monitor    3. Pseudophakia right eye, stable.   - Monitor    4. Posterior vitreous detachment (PVD) right eye, stable.   - Monitor    Follow up in 6 months, V/T/D/RNFL OCT/mac OCT/OVF 24-2, sooner with changes      Attending Physician Attestation:  Complete documentation of historical and exam elements from today's encounter can be found in the full encounter summary report (not reduplicated in this progress note).  I personally obtained the chief complaint(s) and history of present illness.  I confirmed and edited as necessary the review of systems, past medical/surgical history, family history, social history, and examination findings as documented by others; and I examined the patient myself.  I personally reviewed the relevant tests, images, and reports as documented above.  I formulated and edited as necessary the assessment and plan and discussed the findings and management plan with the patient and family.  - Yaima Maynard M.D.

## 2022-08-29 RX ORDER — METRONIDAZOLE 7.5 MG/G
1 GEL VAGINAL DAILY
Qty: 70 G | Refills: 0 | Status: SHIPPED | OUTPATIENT
Start: 2022-08-29 | End: 2022-09-03

## 2022-09-01 RX ORDER — METRONIDAZOLE 500 MG/1
500 TABLET ORAL 2 TIMES DAILY
Qty: 14 TABLET | Refills: 0 | Status: SHIPPED | OUTPATIENT
Start: 2022-09-01 | End: 2022-09-08

## 2022-09-15 ENCOUNTER — LAB (OUTPATIENT)
Dept: LAB | Facility: CLINIC | Age: 22
End: 2022-09-15
Payer: COMMERCIAL

## 2022-09-15 DIAGNOSIS — Z79.899 HIGH RISK MEDICATION USE: ICD-10-CM

## 2022-09-15 DIAGNOSIS — M08.3 JIA (JUVENILE IDIOPATHIC ARTHRITIS), POLYARTHRITIS, RHEUMAT FACTOR NEG (H): ICD-10-CM

## 2022-09-15 LAB
BASOPHILS # BLD AUTO: 0.1 10E3/UL (ref 0–0.2)
BASOPHILS NFR BLD AUTO: 1 %
EOSINOPHIL # BLD AUTO: 0.2 10E3/UL (ref 0–0.7)
EOSINOPHIL NFR BLD AUTO: 3 %
ERYTHROCYTE [DISTWIDTH] IN BLOOD BY AUTOMATED COUNT: 13.3 % (ref 10–15)
HCT VFR BLD AUTO: 37.1 % (ref 35–47)
HGB BLD-MCNC: 12.3 G/DL (ref 11.7–15.7)
LYMPHOCYTES # BLD AUTO: 2.8 10E3/UL (ref 0.8–5.3)
LYMPHOCYTES NFR BLD AUTO: 36 %
MCH RBC QN AUTO: 29.1 PG (ref 26.5–33)
MCHC RBC AUTO-ENTMCNC: 33.2 G/DL (ref 31.5–36.5)
MCV RBC AUTO: 88 FL (ref 78–100)
MONOCYTES # BLD AUTO: 0.7 10E3/UL (ref 0–1.3)
MONOCYTES NFR BLD AUTO: 9 %
NEUTROPHILS # BLD AUTO: 4.1 10E3/UL (ref 1.6–8.3)
NEUTROPHILS NFR BLD AUTO: 52 %
PLATELET # BLD AUTO: 309 10E3/UL (ref 150–450)
RBC # BLD AUTO: 4.22 10E6/UL (ref 3.8–5.2)
WBC # BLD AUTO: 7.9 10E3/UL (ref 4–11)

## 2022-09-15 PROCEDURE — 86140 C-REACTIVE PROTEIN: CPT

## 2022-09-15 PROCEDURE — 84075 ASSAY ALKALINE PHOSPHATASE: CPT

## 2022-09-15 PROCEDURE — 84460 ALANINE AMINO (ALT) (SGPT): CPT

## 2022-09-15 PROCEDURE — 36415 COLL VENOUS BLD VENIPUNCTURE: CPT

## 2022-09-15 PROCEDURE — 82565 ASSAY OF CREATININE: CPT

## 2022-09-15 PROCEDURE — 85025 COMPLETE CBC W/AUTO DIFF WBC: CPT

## 2022-09-15 PROCEDURE — 84450 TRANSFERASE (AST) (SGOT): CPT

## 2022-09-16 LAB — CRP SERPL-MCNC: 4.8 MG/L

## 2022-09-17 LAB
ALP SERPL-CCNC: 63 U/L (ref 40–150)
ALT SERPL W P-5'-P-CCNC: 78 U/L (ref 0–50)
AST SERPL W P-5'-P-CCNC: 42 U/L (ref 0–45)
CREAT SERPL-MCNC: 0.8 MG/DL (ref 0.52–1.04)
GFR SERPL CREATININE-BSD FRML MDRD: >90 ML/MIN/1.73M2

## 2022-09-19 ENCOUNTER — OFFICE VISIT (OUTPATIENT)
Dept: RHEUMATOLOGY | Facility: CLINIC | Age: 22
End: 2022-09-19
Attending: STUDENT IN AN ORGANIZED HEALTH CARE EDUCATION/TRAINING PROGRAM
Payer: COMMERCIAL

## 2022-09-19 VITALS
BODY MASS INDEX: 35.48 KG/M2 | OXYGEN SATURATION: 96 % | HEART RATE: 67 BPM | HEIGHT: 64 IN | SYSTOLIC BLOOD PRESSURE: 115 MMHG | DIASTOLIC BLOOD PRESSURE: 76 MMHG

## 2022-09-19 DIAGNOSIS — Z79.899 HIGH RISK MEDICATION USE: ICD-10-CM

## 2022-09-19 DIAGNOSIS — H20.041 SECONDARY IRIDOCYCLITIS, NONINFECTIOUS, RIGHT: ICD-10-CM

## 2022-09-19 DIAGNOSIS — M08.3 JIA (JUVENILE IDIOPATHIC ARTHRITIS), POLYARTHRITIS, RHEUMAT FACTOR NEG (H): Primary | ICD-10-CM

## 2022-09-19 DIAGNOSIS — H20.9 UVEITIS: ICD-10-CM

## 2022-09-19 PROCEDURE — 99213 OFFICE O/P EST LOW 20 MIN: CPT | Performed by: STUDENT IN AN ORGANIZED HEALTH CARE EDUCATION/TRAINING PROGRAM

## 2022-09-19 PROCEDURE — G0463 HOSPITAL OUTPT CLINIC VISIT: HCPCS

## 2022-09-19 ASSESSMENT — PAIN SCALES - GENERAL: PAINLEVEL: NO PAIN (0)

## 2022-09-19 NOTE — PROGRESS NOTES
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 Graduated May 2021. Now has a full time job working in the [employer]     ROS: 10 point ROS neg other than the symptoms noted above in the HPI.   ALLERGIES: Patient has no known allergies.   MEDS: personally reviewed, notable for Humira 40 mg every 14 days and predforte eye drops.    PRIOR RHEUM MEDS:  infliximab infusion - [id number] transitioned because went to college  Methotrexate - started in elementary school. Weekly injections. weaned off slowly first year of college, would vomit day after methotrexate an/ord often felt nauseas the next day    PMHx:  Past Medical History:   Diagnosis Date     Abnormal Pap smear of cervix 07/19/2021     OMERO (juvenile idiopathic arthritis), oligoarthritis, extended (H) 06/07/2012     OMERO (juvenile idiopathic arthritis), oligoarthritis, persistent (H) 06/07/2012     Nonsenile cataract      Uveitis     - left ankle fracture 2012  - history of positive CMV 2015    PSHx:  Past Surgical History:   Procedure Laterality Date     CATARACT IOL, RT/LT       EXAM UNDER ANESTHESIA EYE(S) Bilateral 12/9/2014    Procedure: EXAM UNDER ANESTHESIA EYE(S);  Surgeon: Ramon Manuel MD;  [facility]     INJECT STEROID (LOCATION)       LENSECTOMY CHILD Right 12/9/2014    Procedure: LENSECTOMY CHILD;  Surgeon: Ramon Manuel MD;  [facility]       SocHx:  Social History     Tobacco Use     Smoking status: Never Smoker     Smokeless tobacco: Never Used   Substance Use Topics     Alcohol use: No     Alcohol/week: 0.0 standard drinks     Drug use: No   never smoker  Just finished college, majored in math, 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 No known family history of autoimmune disease    PHYSICAL EXAM  Vitals:  "/76   Pulse 67   Ht 1.63 m (5' 4.17\")   SpO2 96%   BMI 35.48 kg/m    BMI= Body mass index is 35.48 kg/m .   General: NAD  HEENT: face symmetric, moist mucosa, no oral ulcers  Resp: comfortably breathing on room air  CV: regular, warm extremities  MSK: no pain or tenderness and normal ROM in DIPs, PIPs, MCPs, wrists, elbows, shoulders, ankles, knees, hips, SIJ.     LAB REVIEW:  RHEUM RESULTS Latest Ref Rng & Units 7/9/2008 8/20/2008 10/8/2008   ALBUMIN 3.4 - 5.0 g/dL 4.3 4.2 4.6   ALBUMIN (EXTERNAL) 3.9 - 5.0 - - -   ALT 0 - 50 U/L 11 11 14   AST 0 - 45 U/L 32 32 35   WILLIAN INTERPRETATION Negative - - -   CREATININE 0.52 - 1.04 mg/dL 0.50 - 0.44   CREATININE (EXTERNAL) 0.6 - 1.1 - - -   CRP <5.00 mg/L - 8.8(H) <3.0   SARITA - - <1.0 -   GFR ESTIMATE, IF BLACK >60 mL/min/[1.73:m2] GFR not calculated, patient <16 years old. - GFR not calculated, patient <16 years old.   GFR ESTIMATE >60 mL/min/1.73m2 GFR not calculated, patient <16 years old. - GFR not calculated, patient <16 years old.   HEMATOCRIT 35.0 - 47.0 % 35.7 34.2 38.0   HEMOGLOBIN 11.7 - 15.7 g/dL 11.4 10.7 12.1   HEPBANG Nonreactive - Negative -   HCVAB Nonreactive - Negative -   WBC 4.0 - 11.0 10e3/uL 7.4 6.8 6.6   RBC 3.80 - 5.20 10e6/uL 4.47 4.32 4.48   RDW 10.0 - 15.0 % 13.3 14.9 18.4(H)   MCHC 31.5 - 36.5 g/dL 31.9 31.3(L) 31.8   MCV 78 - 100 fL 80 79 85   PLATELET COUNT 150 - 450 10e3/uL 523(H) 446 373   RHEUMATOID FACTOR 0 - 14 IU/mL - 9 -   ESR 0 - 20 mm/h 54(H) 34(H) 9   QUANTIFERON-TB GOLD PLUS RESULT Negative - - -     Lab Results   Component Value Date/Time    MIKE Negative 10/02/2021 11:53 AM    RHF 9 08/20/2008 12:41 PM    CCPIGG 2.6 10/02/2021 11:52 AM     HLA B27 negative  2008    Lab Results   Component Value Date/Time    HBCAB Nonreactive 10/02/2021 11:52 AM    HEPBANG Nonreactive 10/02/2021 11:52 AM    HEPBANG Negative 08/20/2008 12:41 PM    TBRES Negative 10/02/2021 11:52 AM        IMAGING, the following have been personally " reviewed:  7/2016 XR feet: Normal radiograph of the feet.  7/2016 XR wrists: Improved negative ulnar variance on the right. No new erosions.  7/2016 XR ankles: Normal right and left ankles      ASSESSMENT: 21 yo with [id number]- polyarticular OMERO involving right knee, right ankle, right midfoot, and right eye who has had stable disease on adalimumab 40 mg every 14 days. She continues to require predforte eyedrops and has elevated eye pressures. 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 She will discuss plans for her eye drops with Dr. Maynard at next visit and I am happy to adjust frequency of her adalimumab if it is needed.    PLAN:  -- repeat medication monitoring labs every 4 months  -- continue adalimumab 40 mg every 14 days   -- continue monitoring per ophthamology    RTC in 1 year    Josiane Carolina MD, PhD  Rheumatology     20 minutes spent on the date of encounter doing chart review, history and exam, documentation, care coordination, and further activities as noted above

## 2022-09-19 NOTE — PATIENT INSTRUCTIONS
No changes! Doing great  Let me know if you may need an adjustment in your meds to better cover your eyes when you next see Dr. Maynard  Otherwise enjoy your year

## 2022-09-19 NOTE — LETTER
9/19/2022       RE: Colette Marcos  [address]  [place] [address]     Dear Colleague,    Thank you for referring your patient, Colette Marcos, to the [facility] at [facility]. Please see a copy of my visit note below.    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 Graduated May 2021. Now has a full time job working in the [employer]     ROS: 10 point ROS neg other than the symptoms noted above in the HPI.   ALLERGIES: Patient has no known allergies.   MEDS: personally reviewed, notable for Humira 40 mg every 14 days and predforte eye drops.    PRIOR RHEUM MEDS:  infliximab infusion - 2438-0757 transitioned because went to college  Methotrexate - started in elementary school. Weekly injections. weaned off slowly first year of college, would vomit day after methotrexate an/ord often felt nauseas the next day    PMHx:  Past Medical History:   Diagnosis Date     Abnormal Pap smear of cervix 07/19/2021     OMERO (juvenile idiopathic arthritis), oligoarthritis, extended (H) 06/07/2012     OMERO (juvenile idiopathic arthritis), oligoarthritis, persistent (H) 06/07/2012     Nonsenile cataract      Uveitis     - left ankle fracture 2012  - history of positive CMV 2015    PSHx:  Past Surgical History:   Procedure Laterality Date     CATARACT IOL, RT/LT       EXAM UNDER ANESTHESIA EYE(S) Bilateral 12/9/2014    Procedure: EXAM UNDER ANESTHESIA EYE(S);  Surgeon: Ramon Manuel MD;  [facility]     INJECT STEROID (LOCATION)       LENSECTOMY CHILD Right 12/9/2014    Procedure: LENSECTOMY CHILD;  Surgeon: Ramon Manuel MD;  [facility]       SocHx:  Social History     Tobacco Use     Smoking status: Never Smoker     Smokeless tobacco: Never Used   Substance Use Topics     Alcohol use: No      "Alcohol/week: 0.0 standard drinks     Drug use: No   never smoker  Just finished college, majored in [employer], 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 No known family history of autoimmune disease    PHYSICAL EXAM  Vitals: /76   Pulse 67   Ht 1.63 m (5' 4.17\")   SpO2 96%   BMI 35.48 kg/m    BMI= Body mass index is 35.48 kg/m .   General: NAD  HEENT: face symmetric, moist mucosa, no oral ulcers  Resp: comfortably breathing on room air  CV: regular, warm extremities  MSK: no pain or tenderness and normal ROM in DIPs, PIPs, MCPs, wrists, elbows, shoulders, ankles, knees, hips, SIJ.     LAB REVIEW:  RHEUM RESULTS Latest Ref Rng & Units 7/9/2008 8/20/2008 10/8/2008   ALBUMIN 3.4 - 5.0 g/dL 4.3 4.2 4.6   ALBUMIN (EXTERNAL) 3.9 - 5.0 - - -   ALT 0 - 50 U/L 11 11 14   AST 0 - 45 U/L 32 32 35   WILLIAN INTERPRETATION Negative - - -   CREATININE 0.52 - 1.04 mg/dL 0.50 - 0.44   CREATININE (EXTERNAL) 0.6 - 1.1 - - -   CRP <5.00 mg/L - 8.8(H) <3.0   SARITA - - <1.0 -   GFR ESTIMATE, IF BLACK >60 mL/min/[1.73:m2] GFR not calculated, patient <16 years old. - GFR not calculated, patient <16 years old.   GFR ESTIMATE >60 mL/min/1.73m2 GFR not calculated, patient <16 years old. - GFR not calculated, patient <16 years old.   HEMATOCRIT 35.0 - 47.0 % 35.7 34.2 38.0   HEMOGLOBIN 11.7 - 15.7 g/dL 11.4 10.7 12.1   HEPBANG Nonreactive - Negative -   HCVAB Nonreactive - Negative -   WBC 4.0 - 11.0 10e3/uL 7.4 6.8 6.6   RBC 3.80 - 5.20 10e6/uL 4.47 4.32 4.48   RDW 10.0 - 15.0 % 13.3 14.9 18.4(H)   MCHC 31.5 - 36.5 g/dL 31.9 31.3(L) 31.8   MCV 78 - 100 fL 80 79 85   PLATELET COUNT 150 - 450 10e3/uL 523(H) 446 373   RHEUMATOID FACTOR 0 - 14 IU/mL - 9 -   ESR 0 - 20 mm/h 54(H) 34(H) 9   QUANTIFERON-TB GOLD PLUS RESULT Negative - - -     Lab Results   Component Value Date/Time    MIKE Negative 10/02/2021 11:53 AM    RHF 9 08/20/2008 12:41 PM    CCPIGG 2.6 " 10/02/2021 11:52 AM     HLA B27 negative  2008    Lab Results   Component Value Date/Time    HBCAB Nonreactive 10/02/2021 11:52 AM    HEPBANG Nonreactive 10/02/2021 11:52 AM    HEPBANG Negative 08/20/2008 12:41 PM    TBRES Negative 10/02/2021 11:52 AM        IMAGING, the following have been personally reviewed:  7/2016 XR feet: Normal radiograph of the feet.  7/2016 XR wrists: Improved negative ulnar variance on the right. No new erosions.  7/2016 XR ankles: Normal right and left ankles      ASSESSMENT: 21 yo with [id number]- polyarticular OMERO involving right knee, right ankle, right midfoot, and right eye who has had stable disease on adalimumab 40 mg every 14 days. She continues to require predforte eyedrops and has elevated eye pressures. 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 She will discuss plans for her eye drops with Dr. Maynard at next visit and I am happy to adjust frequency of her adalimumab if it is needed.    PLAN:  -- repeat medication monitoring labs every 4 months  -- continue adalimumab 40 mg every 14 days   -- continue monitoring per ophthamology    RTC in 1 year    Josiane Carolina MD, PhD  Rheumatology     20 minutes spent on the date of encounter doing chart review, history and exam, documentation, care coordination, and further activities as noted above

## 2022-09-19 NOTE — NURSING NOTE
"Chief Complaint   Patient presents with     RECHECK     OMERO     /76   Pulse 67   Ht 1.63 m (5' 4.17\")   SpO2 96%   BMI 35.48 kg/m        Maria Esther Hudson MA  "

## 2022-12-26 ENCOUNTER — HEALTH MAINTENANCE LETTER (OUTPATIENT)
Age: 22
End: 2022-12-26

## 2023-01-24 ENCOUNTER — LAB (OUTPATIENT)
Dept: LAB | Facility: CLINIC | Age: 23
End: 2023-01-24
Payer: COMMERCIAL

## 2023-01-24 ENCOUNTER — MYC MEDICAL ADVICE (OUTPATIENT)
Dept: FAMILY MEDICINE | Facility: CLINIC | Age: 23
End: 2023-01-24

## 2023-01-24 DIAGNOSIS — R74.01 ELEVATED ALT MEASUREMENT: ICD-10-CM

## 2023-01-24 DIAGNOSIS — Z79.899 HIGH RISK MEDICATION USE: ICD-10-CM

## 2023-01-24 LAB
ALBUMIN SERPL BCG-MCNC: 4.1 G/DL (ref 3.5–5.2)
ALP SERPL-CCNC: 71 U/L (ref 35–104)
ALT SERPL W P-5'-P-CCNC: 108 U/L (ref 10–35)
AST SERPL W P-5'-P-CCNC: 57 U/L (ref 10–35)
BASOPHILS # BLD AUTO: 0.1 10E3/UL (ref 0–0.2)
BASOPHILS NFR BLD AUTO: 1 %
BILIRUB DIRECT SERPL-MCNC: <0.2 MG/DL (ref 0–0.3)
BILIRUB SERPL-MCNC: 0.2 MG/DL
CREAT SERPL-MCNC: 0.77 MG/DL (ref 0.51–0.95)
CRP SERPL-MCNC: 6.16 MG/L
EOSINOPHIL # BLD AUTO: 0.3 10E3/UL (ref 0–0.7)
EOSINOPHIL NFR BLD AUTO: 4 %
ERYTHROCYTE [DISTWIDTH] IN BLOOD BY AUTOMATED COUNT: 13.6 % (ref 10–15)
GFR SERPL CREATININE-BSD FRML MDRD: >90 ML/MIN/1.73M2
HCT VFR BLD AUTO: 39 % (ref 35–47)
HGB BLD-MCNC: 12.5 G/DL (ref 11.7–15.7)
LYMPHOCYTES # BLD AUTO: 2.4 10E3/UL (ref 0.8–5.3)
LYMPHOCYTES NFR BLD AUTO: 39 %
MCH RBC QN AUTO: 29 PG (ref 26.5–33)
MCHC RBC AUTO-ENTMCNC: 32.1 G/DL (ref 31.5–36.5)
MCV RBC AUTO: 91 FL (ref 78–100)
MONOCYTES # BLD AUTO: 0.6 10E3/UL (ref 0–1.3)
MONOCYTES NFR BLD AUTO: 10 %
NEUTROPHILS # BLD AUTO: 2.9 10E3/UL (ref 1.6–8.3)
NEUTROPHILS NFR BLD AUTO: 47 %
PLATELET # BLD AUTO: 311 10E3/UL (ref 150–450)
PROT SERPL-MCNC: 7.4 G/DL (ref 6.4–8.3)
RBC # BLD AUTO: 4.31 10E6/UL (ref 3.8–5.2)
WBC # BLD AUTO: 6.2 10E3/UL (ref 4–11)

## 2023-01-24 PROCEDURE — 85025 COMPLETE CBC W/AUTO DIFF WBC: CPT

## 2023-01-24 PROCEDURE — 80076 HEPATIC FUNCTION PANEL: CPT

## 2023-01-24 PROCEDURE — 86140 C-REACTIVE PROTEIN: CPT

## 2023-01-24 PROCEDURE — 82565 ASSAY OF CREATININE: CPT

## 2023-01-24 PROCEDURE — 36415 COLL VENOUS BLD VENIPUNCTURE: CPT

## 2023-01-26 NOTE — TELEPHONE ENCOUNTER
SN,  Please see below MyChart message and advise.  Liver labs ordered by Dr. Carolina in rheumatology.  Advised to follow-up with PCP on this issue from result note.  Thanks,  Judith BAPTISTE RN

## 2023-01-27 ENCOUNTER — MYC MEDICAL ADVICE (OUTPATIENT)
Dept: FAMILY MEDICINE | Facility: CLINIC | Age: 23
End: 2023-01-27
Payer: COMMERCIAL

## 2023-01-27 NOTE — RESULT ENCOUNTER NOTE
Hello,    Let's see you for a follow up visit to talk about the results.    See you soon    Yaima Jaime MD

## 2023-02-03 DIAGNOSIS — M08.3 JIA (JUVENILE IDIOPATHIC ARTHRITIS), POLYARTHRITIS, RHEUMAT FACTOR NEG (H): ICD-10-CM

## 2023-02-06 NOTE — TELEPHONE ENCOUNTER
Medication/Dose: adalimumab (HUMIRA *CF*) 40 MG/0.4ML pen kit    Last Written : 8/16/22  Last Quantity: 1 ea, # refills: 5  Last Office Visit :  9/19/22  Pending appointment: 9/18/23     Prescription approved per Refill Protocol    BELEN Hernandez, RN  [facility] Refill Team

## 2023-02-07 ENCOUNTER — TELEPHONE (OUTPATIENT)
Dept: RHEUMATOLOGY | Facility: CLINIC | Age: 23
End: 2023-02-07
Payer: COMMERCIAL

## 2023-02-07 NOTE — TELEPHONE ENCOUNTER
Prior Authorization Approval    Authorization Effective Date:    Authorization Expiration Date: 2/7/2024  Medication: Humira renewal  Approved Dose/Quantity: q14d  Reference #: [id number]   Insurance Company: [employer] - Phone [phone] Fax [phone]  Expected CoPay:       CoPay Card Available:      Foundation Assistance Needed:    Which Pharmacy is filling the prescription (Not needed for infusion/clinic administered): [address]  Pharmacy Notified: Yes  Patient Notified: Yes

## 2023-02-08 ENCOUNTER — VIRTUAL VISIT (OUTPATIENT)
Dept: FAMILY MEDICINE | Facility: CLINIC | Age: 23
End: 2023-02-08
Payer: COMMERCIAL

## 2023-02-08 DIAGNOSIS — R79.89 ELEVATED LFTS: ICD-10-CM

## 2023-02-08 DIAGNOSIS — R10.11 RUQ ABDOMINAL PAIN: ICD-10-CM

## 2023-02-08 DIAGNOSIS — M08.00 JUVENILE RHEUMATOID ARTHRITIS (H): Primary | ICD-10-CM

## 2023-02-08 PROCEDURE — 99213 OFFICE O/P EST LOW 20 MIN: CPT | Mod: VID | Performed by: FAMILY MEDICINE

## 2023-02-08 NOTE — PROGRESS NOTES
"Colette is a 22 year old who is being evaluated via a billable video visit.      How would you like to obtain your AVS? MyChart  If the video visit is dropped, the invitation should be resent by: Text to cell phone: [phone]  Will anyone else be joining your video visit? No        Assessment & Plan     Juvenile rheumatoid arthritis (H)      RUQ abdominal pain  Trial of elimination diet  - US Abdomen Limited; Future    Elevated LFTs    - US Abdomen Limited; Future    Prescription drug management  12 minutes spent on the date of the encounter doing chart review, history and exam, documentation and further activities per the note        BMI:   Estimated body mass index is 35.48 kg/m  as calculated from the following:    Height as of 9/19/22: 1.63 m (5' 4.17\").    Weight as of 7/28/22: 94.3 kg (207 lb 12.8 oz).   Weight management plan: pt is seeing a nutritionist    See Patient Instructions    No follow-ups on file.    Yaima Jaime MD  [facility]    Subjective   Colette is a 22 year old, presenting for the following health issues:  Results      History of Present Illness       Reason for visit:  High AST/ALT lab results    She eats 2-3 servings of fruits and vegetables daily.She consumes 0 sweetened beverage(s) daily.She exercises with enough effort to increase her heart rate 30 to 60 minutes per day.  She exercises with enough effort to increase her heart rate 4 days per week.   She is taking medications regularly.     Elevated LFT:  Is on humira for JRA - has noted a rise in LFTs by her rheumatologist  They would like eval for the cause    --Pt has had IBD in the family and pt Has had loose stools has not tried elimination diet     --Has noted some pain with eating - discussed possible gallstones    --Pt is working on weight loss with nutritionist  Wt Readings from Last 4 Encounters:   07/28/22 94.3 kg (207 lb 12.8 oz)   09/27/21 85.6 kg (188 lb 11.2 oz)   07/19/21 83.9 kg (185 lb) "   01/02/20 80.5 kg (177 lb 7.5 oz)       --patient has had labs for hep B and C - all negative        Review of Systems   Constitutional, HEENT, cardiovascular, pulmonary, gi and gu systems are negative, except as otherwise noted.      Objective           Vitals:  No vitals were obtained today due to virtual visit.    Physical Exam   GENERAL: Healthy, alert and no distress  EYES: Eyes grossly normal to inspection.  No discharge or erythema, or obvious scleral/conjunctival abnormalities.  RESP: No audible wheeze, cough, or visible cyanosis.  No visible retractions or increased work of breathing.    SKIN: Visible skin clear. No significant rash, abnormal pigmentation or lesions.  NEURO: Cranial nerves grossly intact.  Mentation and speech appropriate for age.  PSYCH: Mentation appears normal, affect normal/bright, judgement and insight intact, normal speech and appearance well-groomed.    Lab on 01/24/2023   Component Date Value Ref Range Status     CRP Inflammation 01/24/2023 6.16 (H)  <5.00 mg/L Final     Protein Total 01/24/2023 7.4  6.4 - 8.3 g/dL Final     Albumin 01/24/2023 4.1  3.5 - 5.2 g/dL Final     Bilirubin Total 01/24/2023 0.2  <=1.2 mg/dL Final     Alkaline Phosphatase 01/24/2023 71  35 - 104 U/L Final     AST 01/24/2023 57 (H)  10 - 35 U/L Final     ALT 01/24/2023 108 (H)  10 - 35 U/L Final     Bilirubin Direct 01/24/2023 <0.20  0.00 - 0.30 mg/dL Final     Creatinine 01/24/2023 0.77  0.51 - 0.95 mg/dL Final     GFR Estimate 01/24/2023 >90  >60 mL/min/1.73m2 Final    eGFR calculated using 2021 CKD-EPI equation.     WBC Count 01/24/2023 6.2  4.0 - 11.0 10e3/uL Final     RBC Count 01/24/2023 4.31  3.80 - 5.20 10e6/uL Final     Hemoglobin 01/24/2023 12.5  11.7 - 15.7 g/dL Final     Hematocrit 01/24/2023 39.0  35.0 - 47.0 % Final     MCV 01/24/2023 91  78 - 100 fL Final     MCH 01/24/2023 29.0  26.5 - 33.0 pg Final     MCHC 01/24/2023 32.1  31.5 - 36.5 g/dL Final     RDW 01/24/2023 13.6  10.0 - 15.0 % Final      Platelet Count 01/24/2023 311  150 - 450 10e3/uL Final     % Neutrophils 01/24/2023 47  % Final     % Lymphocytes 01/24/2023 39  % Final     % Monocytes 01/24/2023 10  % Final     % Eosinophils 01/24/2023 4  % Final     % Basophils 01/24/2023 1  % Final     Absolute Neutrophils 01/24/2023 2.9  1.6 - 8.3 10e3/uL Final     Absolute Lymphocytes 01/24/2023 2.4  0.8 - 5.3 10e3/uL Final     Absolute Monocytes 01/24/2023 0.6  0.0 - 1.3 10e3/uL Final     Absolute Eosinophils 01/24/2023 0.3  0.0 - 0.7 10e3/uL Final     Absolute Basophils 01/24/2023 0.1  0.0 - 0.2 10e3/uL Final               Video-Visit Details    Type of service:  Video Visit   Total time was 10 minutes  5:21-5:31   Originating Location (pt. Location): Home  Distant Location (provider location):  On-site  Platform used for Video Visit: Lisa

## 2023-02-08 NOTE — PATIENT INSTRUCTIONS
Consider an elimination diet:    The Whole 30 -   Or  The FODMAP diet    Call [phone]  to schedule the ultrasound    Gall stones can cause a rise of liver enzymes  Liver inflammation for an infection like hepatitis A or B or C can be another cause.  You had these labs run int he lat 2 years and they were normal.

## 2023-02-10 ENCOUNTER — ANCILLARY PROCEDURE (OUTPATIENT)
Dept: ULTRASOUND IMAGING | Facility: CLINIC | Age: 23
End: 2023-02-10
Attending: FAMILY MEDICINE
Payer: COMMERCIAL

## 2023-02-10 DIAGNOSIS — R10.11 RUQ ABDOMINAL PAIN: ICD-10-CM

## 2023-02-10 DIAGNOSIS — R79.89 ELEVATED LFTS: ICD-10-CM

## 2023-02-10 PROCEDURE — 76705 ECHO EXAM OF ABDOMEN: CPT | Mod: GC | Performed by: RADIOLOGY

## 2023-02-17 NOTE — RESULT ENCOUNTER NOTE
Hello,    The ultrasound shows an enlarged liver with fatty material within it.  This tends to happen with excessive alcohol use or weight gain.  Based on your labs it appears this is related to weight gain.  Let me know if you would like any referrals for this.  Please let your specialist know these results as well,    Take care,  Yaima Jaime MD

## 2023-03-10 ENCOUNTER — OFFICE VISIT (OUTPATIENT)
Dept: OPHTHALMOLOGY | Facility: CLINIC | Age: 23
End: 2023-03-10
Attending: OPHTHALMOLOGY
Payer: COMMERCIAL

## 2023-03-10 DIAGNOSIS — H20.11 CHRONIC ANTERIOR UVEITIS OF RIGHT EYE: Primary | ICD-10-CM

## 2023-03-10 DIAGNOSIS — H40.052 BORDERLINE GLAUCOMA OF LEFT EYE WITH OCULAR HYPERTENSION: ICD-10-CM

## 2023-03-10 DIAGNOSIS — H20.9 UVEITIS: ICD-10-CM

## 2023-03-10 PROCEDURE — 99215 OFFICE O/P EST HI 40 MIN: CPT | Performed by: OPHTHALMOLOGY

## 2023-03-10 PROCEDURE — G0463 HOSPITAL OUTPT CLINIC VISIT: HCPCS | Performed by: OPHTHALMOLOGY

## 2023-03-10 PROCEDURE — 92133 CPTRZD OPH DX IMG PST SGM ON: CPT | Performed by: OPHTHALMOLOGY

## 2023-03-10 PROCEDURE — 92083 EXTENDED VISUAL FIELD XM: CPT | Performed by: OPHTHALMOLOGY

## 2023-03-10 RX ORDER — PREDNISOLONE ACETATE 10 MG/ML
1 SUSPENSION/ DROPS OPHTHALMIC 3 TIMES DAILY
Qty: 5 ML | Refills: 2 | Status: SHIPPED | OUTPATIENT
Start: 2023-03-10 | End: 2023-07-14

## 2023-03-10 ASSESSMENT — REFRACTION_MANIFEST
OS_CYLINDER: +0.50
OS_SPHERE: -1.50
OD_ADD: +2.50
OS_ADD: - -
OD_AXIS: 070
OS_AXIS: 180
OD_CYLINDER: +1.00
OD_SPHERE: -1.50

## 2023-03-10 ASSESSMENT — VISUAL ACUITY
METHOD: SNELLEN - LINEAR
OD_CC: 20/20
CORRECTION_TYPE: GLASSES
OS_CC: 20/20

## 2023-03-10 ASSESSMENT — REFRACTION_WEARINGRX
OS_CYLINDER: +0.50
OD_SPHERE: -1.25
OD_AXIS: 074
OS_SPHERE: -1.00
OD_CYLINDER: +1.25
OS_AXIS: 171

## 2023-03-10 ASSESSMENT — SLIT LAMP EXAM - LIDS
COMMENTS: NORMAL
COMMENTS: NORMAL

## 2023-03-10 ASSESSMENT — EXTERNAL EXAM - RIGHT EYE: OD_EXAM: NORMAL

## 2023-03-10 ASSESSMENT — TONOMETRY
OD_IOP_MMHG: 20
OS_IOP_MMHG: 15
IOP_METHOD: ICARE

## 2023-03-10 ASSESSMENT — CUP TO DISC RATIO
OS_RATIO: 0.5
OD_RATIO: 0.5

## 2023-03-10 ASSESSMENT — EXTERNAL EXAM - LEFT EYE: OS_EXAM: NORMAL

## 2023-03-10 NOTE — NURSING NOTE
Chief Complaints and History of Present Illnesses   Patient presents with     Follow Up     Chronic anterior uveitis of right eye      Chief Complaint(s) and History of Present Illness(es)     Follow Up            Comments: Chronic anterior uveitis of right eye           Comments    Pt would like new RX for glasses today  Sstates floaters same as last visit   No flashes, eye pain or redness    Shannon DAVE 12:25 PM March 10, 2023

## 2023-03-25 ENCOUNTER — E-VISIT (OUTPATIENT)
Dept: FAMILY MEDICINE | Facility: CLINIC | Age: 23
End: 2023-03-25
Payer: COMMERCIAL

## 2023-03-25 DIAGNOSIS — Z53.9 ERRONEOUS ENCOUNTER--DISREGARD: Primary | ICD-10-CM

## 2023-03-27 NOTE — TELEPHONE ENCOUNTER
"Patient needs an appointment, preferably with PCP, thanks  Lois \"Phu\" SUZI Hurtado         This encounter was opened in error. Please disregard.  "

## 2023-03-27 NOTE — TELEPHONE ENCOUNTER
Called patient and lvm to call clinic back at [phone].   Patient needs to schedule appt w/ pcp.   Can schedule on mychart or by calling clinic.    Alexandra JANSEN

## 2023-03-30 ENCOUNTER — VIRTUAL VISIT (OUTPATIENT)
Dept: FAMILY MEDICINE | Facility: CLINIC | Age: 23
End: 2023-03-30
Payer: COMMERCIAL

## 2023-03-30 DIAGNOSIS — N92.6 IRREGULAR PERIODS: Primary | ICD-10-CM

## 2023-03-30 DIAGNOSIS — F43.21 ADJUSTMENT DISORDER WITH DEPRESSED MOOD: ICD-10-CM

## 2023-03-30 DIAGNOSIS — R73.03 PREDIABETES: ICD-10-CM

## 2023-03-30 PROCEDURE — 99214 OFFICE O/P EST MOD 30 MIN: CPT | Mod: VID | Performed by: FAMILY MEDICINE

## 2023-03-30 RX ORDER — SERTRALINE HYDROCHLORIDE 100 MG/1
100 TABLET, FILM COATED ORAL DAILY
Qty: 90 TABLET | Refills: 3 | Status: SHIPPED | OUTPATIENT
Start: 2023-03-30 | End: 2023-11-15

## 2023-03-30 NOTE — PROGRESS NOTES
Colette is a 22 year old who is being evaluated via a billable video visit.      How would you like to obtain your AVS? MyChart  If the video visit is dropped, the invitation should be resent by: Text to cell phone: [phone]  Will anyone else be joining your video visit? No        Assessment & Plan     Irregular periods  Possible PCOS/hormonal imbalance  Has same sex partner  Does not want to start OCP or hormones  Would like labs first  Discussed that she can try scheduled nsaids with food for 1 week to stop periods.  Discussed options of PG only pill for 1 week out of every month to induce withdrawal eriod.  Discussed ultrasound to ensure nothing growing/causing the DUB in the uterus  - Hemoglobin A1c; Future  - TSH with free T4 reflex; Future  - DHEA sulfate; Future  - Testosterone Free and Total; Future  - Follicle stimulating hormone; Future  - Luteinizing Hormone; Future  - Prolactin; Future  - US Pelvic Complete with Transvaginal; Future  - Hemoglobin; Future    Adjustment disorder with depressed mood    - sertraline (ZOLOFT) 100 MG tablet; Take 1 tablet (100 mg) by mouth daily    Prescription drug management  20 minutes spent by me on the date of the encounter doing chart review, history and exam, documentation and further activities per the note        See Patient Instructions    Yaima Jaime MD  [facility]   Colette is a 22 year old, presenting for the following health issues:  No chief complaint on file.  No flowsheet data found.  History of Present Illness       Reason for visit:  Missed period for 3 months and now experiencing period lasting over two weeks so far  Symptom onset:  1-2 weeks ago  Symptoms include:  Vaginal bleeding  Symptom intensity:  Moderate  Symptom progression:  Staying the same  What makes it worse:  No  What makes it better:  No    She eats 2-3 servings of fruits and vegetables daily.She consumes 0 sweetened beverage(s) daily.She exercises  with enough effort to increase her heart rate 20 to 29 minutes per day.  She exercises with enough effort to increase her heart rate 4 days per week.   She is taking medications regularly.     Missed period 3 months - used to have this issue when younger  Last period was late December  Got this 3 weeks ago and is persistent usually ha light and short periods    Is sexually active no risk of pregnancy            Review of Systems   Constitutional, HEENT, cardiovascular, pulmonary, gi and gu systems are negative, except as otherwise noted.      Objective           Vitals:  No vitals were obtained today due to virtual visit.    Physical Exam   GENERAL: Healthy, alert and no distress  EYES: Eyes grossly normal to inspection.  No discharge or erythema, or obvious scleral/conjunctival abnormalities.  RESP: No audible wheeze, cough, or visible cyanosis.  No visible retractions or increased work of breathing.    SKIN: Visible skin clear. No significant rash, abnormal pigmentation or lesions.  NEURO: Cranial nerves grossly intact.  Mentation and speech appropriate for age.  PSYCH: Mentation appears normal, affect normal/bright, judgement and insight intact, normal speech and appearance well-groomed.                Video-Visit Details    Type of service:  Video Visit   Joined the call at 3/30/2023, 3:12:01 pm.  Left the call at 3/30/2023, 3:25:47 pm.  You were on the call for 13 minutes 45 seconds  Originating Location (pt. Location): Home  Distant Location (provider location):  On-site  Platform used for Video Visit: Lisa

## 2023-04-05 ENCOUNTER — LAB (OUTPATIENT)
Dept: LAB | Facility: CLINIC | Age: 23
End: 2023-04-05
Payer: COMMERCIAL

## 2023-04-05 DIAGNOSIS — N92.6 IRREGULAR PERIODS: ICD-10-CM

## 2023-04-05 LAB
FSH SERPL IRP2-ACNC: 5.3 MIU/ML
HBA1C MFR BLD: 5.8 % (ref 0–5.6)
HGB BLD-MCNC: 13.2 G/DL (ref 11.7–15.7)
LH SERPL-ACNC: 13 MIU/ML
PROLACTIN SERPL 3RD IS-MCNC: 23 NG/ML (ref 5–23)
SHBG SERPL-SCNC: 30 NMOL/L (ref 30–135)
TSH SERPL DL<=0.005 MIU/L-ACNC: 3.15 UIU/ML (ref 0.3–4.2)

## 2023-04-05 PROCEDURE — 84443 ASSAY THYROID STIM HORMONE: CPT

## 2023-04-05 PROCEDURE — 83002 ASSAY OF GONADOTROPIN (LH): CPT

## 2023-04-05 PROCEDURE — 85018 HEMOGLOBIN: CPT

## 2023-04-05 PROCEDURE — 83001 ASSAY OF GONADOTROPIN (FSH): CPT

## 2023-04-05 PROCEDURE — 36415 COLL VENOUS BLD VENIPUNCTURE: CPT

## 2023-04-05 PROCEDURE — 84403 ASSAY OF TOTAL TESTOSTERONE: CPT

## 2023-04-05 PROCEDURE — 84270 ASSAY OF SEX HORMONE GLOBUL: CPT

## 2023-04-05 PROCEDURE — 83036 HEMOGLOBIN GLYCOSYLATED A1C: CPT

## 2023-04-05 PROCEDURE — 84146 ASSAY OF PROLACTIN: CPT

## 2023-04-05 PROCEDURE — 82627 DEHYDROEPIANDROSTERONE: CPT

## 2023-04-06 LAB — DHEA-S SERPL-MCNC: 129 UG/DL (ref 35–430)

## 2023-04-07 ENCOUNTER — MYC MEDICAL ADVICE (OUTPATIENT)
Dept: FAMILY MEDICINE | Facility: CLINIC | Age: 23
End: 2023-04-07
Payer: COMMERCIAL

## 2023-04-07 DIAGNOSIS — R73.03 PREDIABETES: Primary | ICD-10-CM

## 2023-04-07 LAB
TESTOST FREE SERPL-MCNC: 0.88 NG/DL
TESTOST SERPL-MCNC: 46 NG/DL (ref 8–60)

## 2023-04-07 RX ORDER — METFORMIN HCL 500 MG
500 TABLET, EXTENDED RELEASE 24 HR ORAL
Qty: 90 TABLET | Refills: 1 | Status: SHIPPED | OUTPATIENT
Start: 2023-04-07 | End: 2023-09-28

## 2023-04-07 NOTE — RESULT ENCOUNTER NOTE
Hello,    The other labs were all great.  Normal hormone levels.  The thyroid level was at the upper limit of normal.  I'd like to repeat this in 3-6 months to make sure it is not trending up further.    Yaima Jaime MD

## 2023-04-07 NOTE — RESULT ENCOUNTER NOTE
Hello,    Unfortunately the A1C now shows prediabetes.  This is managed usually with specific diet changes and medications.  Since diet is the best and first tool that we use I will refer you to a diabetes educator - they will teach you all about how food is broken down in the body and which foods to limit or avoid flat out.  Usually a few visits with them is all that is needed.    I would also like to talk to you about medications.  There are a few options.  Metformin is the first medication that we use since it protects the pancreas and lowers the insulin resistance.  There are some side effects like loose stools but if this is noted it usually these resolves in 1-2 weeks.  It is best to take this medication with a meal to avoid side effects.  I can send this in to your pharmacy.    If you have questions we can set up a virtual visit as well to go over this in more detail.   Let's plan to meet virtually in 4-6 weeks either way .    Take care,    Yaima Jaime MD

## 2023-04-10 NOTE — TELEPHONE ENCOUNTER
I called the patient and we reviewed her results in detail.  Discussed insulin resistance.  Discussed that the hemoglobin A1c does show 3 months of elevated blood sugars and is concerning for prediabetes.  Discussed pros and cons to metformin and benefits of the medication including reduced insulin resistance and weight loss and avoidance of potential diabetes.    She does have a rheumatologist that she sees and is on medications for disease modification encouraged her to let them know that we are now following insulin resistance and prediabetes.    We will plan to recheck thyroid level and A1c in 3 months renal function has been excellent continue to monitor as she is through rheumatology.  Patient did not have any questions encouraged to call if she does and discussed how to take medication with food to avoid side effects    Yaima Jaime MD

## 2023-05-26 ENCOUNTER — LAB (OUTPATIENT)
Dept: LAB | Facility: CLINIC | Age: 23
End: 2023-05-26
Payer: COMMERCIAL

## 2023-05-26 DIAGNOSIS — Z79.899 HIGH RISK MEDICATION USE: ICD-10-CM

## 2023-05-26 DIAGNOSIS — R73.03 PREDIABETES: ICD-10-CM

## 2023-05-26 LAB
ALP SERPL-CCNC: 74 U/L (ref 35–104)
ALT SERPL W P-5'-P-CCNC: 89 U/L (ref 10–35)
AST SERPL W P-5'-P-CCNC: 55 U/L (ref 10–35)
BASOPHILS # BLD AUTO: 0.1 10E3/UL (ref 0–0.2)
BASOPHILS NFR BLD AUTO: 1 %
CREAT SERPL-MCNC: 0.86 MG/DL (ref 0.51–0.95)
CRP SERPL-MCNC: 7.44 MG/L
EOSINOPHIL # BLD AUTO: 0.3 10E3/UL (ref 0–0.7)
EOSINOPHIL NFR BLD AUTO: 4 %
ERYTHROCYTE [DISTWIDTH] IN BLOOD BY AUTOMATED COUNT: 13.2 % (ref 10–15)
GFR SERPL CREATININE-BSD FRML MDRD: >90 ML/MIN/1.73M2
HBA1C MFR BLD: 5.7 % (ref 0–5.6)
HCT VFR BLD AUTO: 38.3 % (ref 35–47)
HGB BLD-MCNC: 12.6 G/DL (ref 11.7–15.7)
IMM GRANULOCYTES # BLD: 0 10E3/UL
IMM GRANULOCYTES NFR BLD: 0 %
LYMPHOCYTES # BLD AUTO: 2.9 10E3/UL (ref 0.8–5.3)
LYMPHOCYTES NFR BLD AUTO: 38 %
MCH RBC QN AUTO: 28.3 PG (ref 26.5–33)
MCHC RBC AUTO-ENTMCNC: 32.9 G/DL (ref 31.5–36.5)
MCV RBC AUTO: 86 FL (ref 78–100)
MONOCYTES # BLD AUTO: 0.7 10E3/UL (ref 0–1.3)
MONOCYTES NFR BLD AUTO: 10 %
NEUTROPHILS # BLD AUTO: 3.5 10E3/UL (ref 1.6–8.3)
NEUTROPHILS NFR BLD AUTO: 47 %
PLATELET # BLD AUTO: 355 10E3/UL (ref 150–450)
RBC # BLD AUTO: 4.46 10E6/UL (ref 3.8–5.2)
TSH SERPL DL<=0.005 MIU/L-ACNC: 2.04 UIU/ML (ref 0.3–4.2)
WBC # BLD AUTO: 7.5 10E3/UL (ref 4–11)

## 2023-05-26 PROCEDURE — 82565 ASSAY OF CREATININE: CPT

## 2023-05-26 PROCEDURE — 36415 COLL VENOUS BLD VENIPUNCTURE: CPT

## 2023-05-26 PROCEDURE — 83036 HEMOGLOBIN GLYCOSYLATED A1C: CPT

## 2023-05-26 PROCEDURE — 84443 ASSAY THYROID STIM HORMONE: CPT

## 2023-05-26 PROCEDURE — 84075 ASSAY ALKALINE PHOSPHATASE: CPT

## 2023-05-26 PROCEDURE — 84460 ALANINE AMINO (ALT) (SGPT): CPT

## 2023-05-26 PROCEDURE — 86140 C-REACTIVE PROTEIN: CPT

## 2023-05-26 PROCEDURE — 84450 TRANSFERASE (AST) (SGOT): CPT

## 2023-05-26 PROCEDURE — 85025 COMPLETE CBC W/AUTO DIFF WBC: CPT

## 2023-06-02 NOTE — RESULT ENCOUNTER NOTE
Hello,    The A1c shows stable prediabetes. The thyroid levels are improved.  Let me know if you have questions or if I can help.    Take care,    Yaima Jaime MD

## 2023-06-05 ENCOUNTER — MYC MEDICAL ADVICE (OUTPATIENT)
Dept: FAMILY MEDICINE | Facility: CLINIC | Age: 23
End: 2023-06-05
Payer: COMMERCIAL

## 2023-06-28 ENCOUNTER — PATIENT OUTREACH (OUTPATIENT)
Dept: CARE COORDINATION | Facility: CLINIC | Age: 23
End: 2023-06-28
Payer: COMMERCIAL

## 2023-07-10 ENCOUNTER — PATIENT OUTREACH (OUTPATIENT)
Dept: FAMILY MEDICINE | Facility: CLINIC | Age: 23
End: 2023-07-10
Payer: COMMERCIAL

## 2023-07-10 DIAGNOSIS — R87.610 ATYPICAL SQUAMOUS CELLS OF UNDETERMINED SIGNIFICANCE (ASCUS) ON PAPANICOLAOU SMEAR OF CERVIX: ICD-10-CM

## 2023-07-12 ENCOUNTER — PATIENT OUTREACH (OUTPATIENT)
Dept: CARE COORDINATION | Facility: CLINIC | Age: 23
End: 2023-07-12
Payer: COMMERCIAL

## 2023-07-14 ENCOUNTER — OFFICE VISIT (OUTPATIENT)
Dept: OPHTHALMOLOGY | Facility: CLINIC | Age: 23
End: 2023-07-14
Attending: OPHTHALMOLOGY
Payer: COMMERCIAL

## 2023-07-14 DIAGNOSIS — H20.11 CHRONIC ANTERIOR UVEITIS OF RIGHT EYE: Primary | ICD-10-CM

## 2023-07-14 DIAGNOSIS — H20.9 UVEITIS: ICD-10-CM

## 2023-07-14 DIAGNOSIS — H40.053 BORDERLINE GLAUCOMA OF BOTH EYES WITH OCULAR HYPERTENSION: ICD-10-CM

## 2023-07-14 PROCEDURE — G0463 HOSPITAL OUTPT CLINIC VISIT: HCPCS | Performed by: OPHTHALMOLOGY

## 2023-07-14 PROCEDURE — 92083 EXTENDED VISUAL FIELD XM: CPT | Performed by: OPHTHALMOLOGY

## 2023-07-14 PROCEDURE — 99214 OFFICE O/P EST MOD 30 MIN: CPT | Performed by: OPHTHALMOLOGY

## 2023-07-14 PROCEDURE — 92083 EXTENDED VISUAL FIELD XM: CPT | Mod: 26 | Performed by: OPHTHALMOLOGY

## 2023-07-14 RX ORDER — PREDNISOLONE ACETATE 10 MG/ML
1 SUSPENSION/ DROPS OPHTHALMIC 3 TIMES DAILY
Qty: 5 ML | Refills: 2 | Status: SHIPPED | OUTPATIENT
Start: 2023-07-14 | End: 2024-03-22

## 2023-07-14 ASSESSMENT — CONF VISUAL FIELD
OD_NORMAL: 1
OD_SUPERIOR_TEMPORAL_RESTRICTION: 0
METHOD: COUNTING FINGERS
OS_SUPERIOR_NASAL_RESTRICTION: 0
OD_INFERIOR_NASAL_RESTRICTION: 0
OS_SUPERIOR_TEMPORAL_RESTRICTION: 0
OS_INFERIOR_TEMPORAL_RESTRICTION: 0
OS_INFERIOR_NASAL_RESTRICTION: 0
OD_INFERIOR_TEMPORAL_RESTRICTION: 0
OD_SUPERIOR_NASAL_RESTRICTION: 0
OS_NORMAL: 1

## 2023-07-14 ASSESSMENT — VISUAL ACUITY
OS_SC: 20/20
CORRECTION_TYPE: GLASSES
OS_SC+: -2
OD_SC: 20/20
OD_SC+: -3
METHOD: SNELLEN - LINEAR

## 2023-07-14 ASSESSMENT — REFRACTION_WEARINGRX
OD_AXIS: 070
OS_SPHERE: -1.50
OS_AXIS: 180
OS_ADD: - -
OD_SPHERE: -1.50
OD_ADD: +2.50
OD_CYLINDER: +1.00
OS_CYLINDER: +0.50

## 2023-07-14 ASSESSMENT — CUP TO DISC RATIO
OS_RATIO: 0.5
OD_RATIO: 0.5

## 2023-07-14 ASSESSMENT — TONOMETRY
OD_IOP_MMHG: 18
OS_IOP_MMHG: 17
IOP_METHOD: TONOPEN

## 2023-07-14 ASSESSMENT — EXTERNAL EXAM - RIGHT EYE: OD_EXAM: NORMAL

## 2023-07-14 ASSESSMENT — SLIT LAMP EXAM - LIDS
COMMENTS: NORMAL
COMMENTS: NORMAL

## 2023-07-14 ASSESSMENT — EXTERNAL EXAM - LEFT EYE: OS_EXAM: NORMAL

## 2023-07-14 NOTE — PATIENT INSTRUCTIONS
Continue Humira    1 month prior to next eye clinic visit, decrease prednisolone right eye to twice a day

## 2023-07-14 NOTE — PROGRESS NOTES
HPI: Colette Marcos is a 23 year old female with a history of OMERO-associated anterior uveitis right eye here for 4 month uveitis clinic follow up. No eye changes or concerns since last visit. She has chronic, stable floaters right eye. No flashes.     Current medications: prednisolone acetate three times a day right eye, Humira q2wk (started 6.7.18). IMT prescribed by rheum: Dr. Carolina at [facility].    Prior medications have included various oral NSAIDs for joints and a short course of oral prednisone for joints (patient did not tolerate well, felt sick per mother's report, patient does not recall because it was so long ago). For ease of use she switched to Humira from Remicade 700 mg (~9.5 mg/kg) q4wk (Fall 2013 - 5.7.18), oral methotrexate 10 mg (was on and off methotrexate since age 8-9 years, previously on 15 mg/wk with taper starting early Jan 2019 - stopped July 20, 2019).    Ocular history:   1. 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 Joint disease (RF-negative, polyarticular) first diagnosed at age 5 years (though mother reports that she may have been symptomatic as early as age 2 years with ankle pain and swelling). Follows with Dr. Arnold (peds rheum) for joint disease and immunomodulatory therapy. Uveitis first diagnosed at age 8-9 years. Patient and mother report no history of uveitis left eye. Review of electronic records here (through 01/2014) shows no cell left eye.    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   Most recently had 0.5+ anterior chamber cell right eye in August 2017 (low trace, ~1 cell/hpf, was holding MTX at time of exam) and 0.5+ anterior chamber cell right eye in March 2019 (0-2 cells/hpf) and Melissa/Aug 2020 (1 cell/hpf).  2. History of cystoid macular edema right eye, s/p intravitreal corticosteroid right eye in September 2013 (Dr. Fofana).  3. Pseudophakia right eye s/p cataract extraction/IOL 12.09.14 ([facility]),  s/p YAG capsulotomy 04.24.17.    Ocular Imaging:  Optos fluorescein angiography 6.25.21: no leakage in either eye    Macular OCT 3.10.23: within normal limits and stable both eyes    Central corneal thickness 8.14.20: 571/569  RNFL OCT 3.10.23 within normal limits and stable vs 8.14.20 both eyes    OVF 24-2 3.10.23:  OD - reliable. Diffuse non-specific loss. MD -5.7 **Patient reports that target was difficult to see (bifocal correction was not done for this test - patient has had cataract surgery right eye only and requires bifocal add for visual field testing right eye).  OS - reliable. Normal. MD 0.6    OVF 24-2 7.14.23 right eye: reliable. No defects.    Impression/Plan:   1. OMERO-associated anterior uveitis right eye, quiescent today.   - Continue Humira q2wk   - Continue Predforte right eye three times a day for now, then 1 month prior to next visit decrease Predforte right eye to twice a day    2. Ocular hypertension both eyes. Repeat retinal nerve fiber layer OCT in March 2023 is normal and stable. Visual field testing right eye today shows no glaucomatous loss.   - Intraocular pressure remains at higher end of normal, symmetric despite Predforte only in right eye   - Monitor    3. Pseudophakia right eye, stable.   - Monitor    4. Posterior vitreous detachment (PVD) right eye, stable.   - Monitor    Follow up in 4 months, V/T, sooner with changes    Attending Physician Attestation:  Complete documentation of historical and exam elements from today's encounter can be found in the full encounter summary report (not reduplicated in this progress note).  I personally obtained the chief complaint(s) and history of present illness.  I confirmed and edited as necessary the review of systems, past medical/surgical history, family history, social history, and examination findings as documented by others; and I examined the patient myself.  I personally reviewed the relevant tests, images, and reports as documented  above.  I formulated and edited as necessary the assessment and plan and discussed the findings and management plan with the patient and family.  - Yaima Maynard M.D.

## 2023-07-14 NOTE — NURSING NOTE
Chief Complaints and History of Present Illnesses   Patient presents with     Uveitis Follow-Up     Pt here for 4 month follow up on Chronic anterior uveitis of right eye.      Chief Complaint(s) and History of Present Illness(es)     Uveitis Follow-Up            Laterality: right eye    Comments: Pt here for 4 month follow up on Chronic anterior uveitis of right eye.           Comments    Pt has unchanged vision. No new concerns.   Pt using:  Humira q2wk  Predforte right eye BIJAL Flores on 7/14/2023 at 11:06 AM

## 2023-08-15 ENCOUNTER — MYC MEDICAL ADVICE (OUTPATIENT)
Dept: FAMILY MEDICINE | Facility: CLINIC | Age: 23
End: 2023-08-15
Payer: COMMERCIAL

## 2023-08-15 DIAGNOSIS — R73.03 PREDIABETES: Primary | ICD-10-CM

## 2023-08-17 ENCOUNTER — LAB (OUTPATIENT)
Dept: LAB | Facility: CLINIC | Age: 23
End: 2023-08-17
Payer: COMMERCIAL

## 2023-08-17 DIAGNOSIS — Z79.899 HIGH RISK MEDICATION USE: ICD-10-CM

## 2023-08-17 DIAGNOSIS — M08.3 JIA (JUVENILE IDIOPATHIC ARTHRITIS), POLYARTHRITIS, RHEUMAT FACTOR NEG (H): ICD-10-CM

## 2023-08-17 DIAGNOSIS — R73.03 PREDIABETES: ICD-10-CM

## 2023-08-17 LAB
BASOPHILS # BLD AUTO: 0.1 10E3/UL (ref 0–0.2)
BASOPHILS NFR BLD AUTO: 1 %
EOSINOPHIL # BLD AUTO: 0.3 10E3/UL (ref 0–0.7)
EOSINOPHIL NFR BLD AUTO: 4 %
ERYTHROCYTE [DISTWIDTH] IN BLOOD BY AUTOMATED COUNT: 13.2 % (ref 10–15)
HBA1C MFR BLD: 5.8 % (ref 0–5.6)
HCT VFR BLD AUTO: 38.1 % (ref 35–47)
HGB BLD-MCNC: 12.6 G/DL (ref 11.7–15.7)
IMM GRANULOCYTES # BLD: 0 10E3/UL
IMM GRANULOCYTES NFR BLD: 0 %
LYMPHOCYTES # BLD AUTO: 2.7 10E3/UL (ref 0.8–5.3)
LYMPHOCYTES NFR BLD AUTO: 38 %
MCH RBC QN AUTO: 29.4 PG (ref 26.5–33)
MCHC RBC AUTO-ENTMCNC: 33.1 G/DL (ref 31.5–36.5)
MCV RBC AUTO: 89 FL (ref 78–100)
MONOCYTES # BLD AUTO: 0.7 10E3/UL (ref 0–1.3)
MONOCYTES NFR BLD AUTO: 9 %
NEUTROPHILS # BLD AUTO: 3.4 10E3/UL (ref 1.6–8.3)
NEUTROPHILS NFR BLD AUTO: 48 %
PLATELET # BLD AUTO: 315 10E3/UL (ref 150–450)
RBC # BLD AUTO: 4.29 10E6/UL (ref 3.8–5.2)
WBC # BLD AUTO: 7.1 10E3/UL (ref 4–11)

## 2023-08-17 PROCEDURE — 82565 ASSAY OF CREATININE: CPT

## 2023-08-17 PROCEDURE — 85025 COMPLETE CBC W/AUTO DIFF WBC: CPT

## 2023-08-17 PROCEDURE — 84075 ASSAY ALKALINE PHOSPHATASE: CPT

## 2023-08-17 PROCEDURE — 86140 C-REACTIVE PROTEIN: CPT

## 2023-08-17 PROCEDURE — 83036 HEMOGLOBIN GLYCOSYLATED A1C: CPT

## 2023-08-17 PROCEDURE — 36415 COLL VENOUS BLD VENIPUNCTURE: CPT

## 2023-08-17 PROCEDURE — 84450 TRANSFERASE (AST) (SGOT): CPT

## 2023-08-17 PROCEDURE — 84460 ALANINE AMINO (ALT) (SGPT): CPT

## 2023-08-18 LAB
ALP SERPL-CCNC: 76 U/L (ref 35–104)
ALT SERPL W P-5'-P-CCNC: 117 U/L (ref 0–50)
AST SERPL W P-5'-P-CCNC: 68 U/L (ref 0–45)
CREAT SERPL-MCNC: 0.8 MG/DL (ref 0.51–0.95)
CRP SERPL-MCNC: 6.51 MG/L
GFR SERPL CREATININE-BSD FRML MDRD: >90 ML/MIN/1.73M2

## 2023-08-19 ENCOUNTER — MYC MEDICAL ADVICE (OUTPATIENT)
Dept: RHEUMATOLOGY | Facility: CLINIC | Age: 23
End: 2023-08-19
Payer: COMMERCIAL

## 2023-08-19 DIAGNOSIS — Z79.899 HIGH RISK MEDICATION USE: Primary | ICD-10-CM

## 2023-08-19 DIAGNOSIS — M08.3 JIA (JUVENILE IDIOPATHIC ARTHRITIS), POLYARTHRITIS, RHEUMAT FACTOR NEG (H): ICD-10-CM

## 2023-08-21 ENCOUNTER — TELEPHONE (OUTPATIENT)
Dept: RHEUMATOLOGY | Facility: CLINIC | Age: 23
End: 2023-08-21
Payer: COMMERCIAL

## 2023-08-21 NOTE — TELEPHONE ENCOUNTER
LVM for patient, providing my direct extension and sent MyC message in follow up to Dr. Dawn's inquiry of how patient is feeling d/t continued elevation of inflammatory markers and LFT's.  Request patient either call back or message back via [employer].    Charleen Knott RN  Rheumatology Clinic

## 2023-08-21 NOTE — TELEPHONE ENCOUNTER
Attempt to call patient but no answer and call did not roll to .  [facility] message sent to patient with follow up questions per provider:  1-would patient be okay with referral to Hepatology?  2-does patient have a Dermatologist she see for the eczema?    Charleen Knott RN  Rheumatology Clinic

## 2023-08-21 NOTE — TELEPHONE ENCOUNTER
----- Message from Josiane Carolina MD sent at 8/19/2023  7:49 PM CDT -----  Charleen, something is going on with her labs. If we don't hear from her by Monday, can you give her a call and see how she is feeling?

## 2023-08-21 NOTE — TELEPHONE ENCOUNTER
Patient MyC response routed to provider for review.  Provider had sent a lab results message, questioning how patient has been feeling d/t elevated inflammatory and LFT lab results.    Patient responds she currently is experiencing an eczema flare.  Spoke to patient who updates she has not used anything to treat the eczema, but does have OTC anti itch cream that she is going to try.  Also, states she believes the elevated liver tests are due to what her pcp called fatty liver but is not sure what the plan is for that.    Route to provider for update/review.    Charleen Knott RN  Rheumatology Clinic

## 2023-08-21 NOTE — TELEPHONE ENCOUNTER
Patient MyC message response in previous encounter routed to provider for review.  This encounter is closed.    Charleen Knott RN  Rheumatology Clinic

## 2023-08-22 ENCOUNTER — MYC MEDICAL ADVICE (OUTPATIENT)
Dept: FAMILY MEDICINE | Facility: CLINIC | Age: 23
End: 2023-08-22

## 2023-08-22 ENCOUNTER — VIRTUAL VISIT (OUTPATIENT)
Dept: FAMILY MEDICINE | Facility: CLINIC | Age: 23
End: 2023-08-22
Payer: COMMERCIAL

## 2023-08-22 DIAGNOSIS — N92.1 MENORRHAGIA WITH IRREGULAR CYCLE: Primary | ICD-10-CM

## 2023-08-22 DIAGNOSIS — R73.03 PREDIABETES: ICD-10-CM

## 2023-08-22 PROCEDURE — 99214 OFFICE O/P EST MOD 30 MIN: CPT | Mod: VID | Performed by: FAMILY MEDICINE

## 2023-08-22 RX ORDER — ETONOGESTREL AND ETHINYL ESTRADIOL VAGINAL RING .015; .12 MG/D; MG/D
RING VAGINAL
Qty: 1 EACH | Refills: 11 | Status: SHIPPED | OUTPATIENT
Start: 2023-08-22 | End: 2023-11-15

## 2023-08-22 ASSESSMENT — ANXIETY QUESTIONNAIRES
6. BECOMING EASILY ANNOYED OR IRRITABLE: SEVERAL DAYS
5. BEING SO RESTLESS THAT IT IS HARD TO SIT STILL: SEVERAL DAYS
2. NOT BEING ABLE TO STOP OR CONTROL WORRYING: MORE THAN HALF THE DAYS
7. FEELING AFRAID AS IF SOMETHING AWFUL MIGHT HAPPEN: NOT AT ALL
GAD7 TOTAL SCORE: 8
4. TROUBLE RELAXING: SEVERAL DAYS
IF YOU CHECKED OFF ANY PROBLEMS ON THIS QUESTIONNAIRE, HOW DIFFICULT HAVE THESE PROBLEMS MADE IT FOR YOU TO DO YOUR WORK, TAKE CARE OF THINGS AT HOME, OR GET ALONG WITH OTHER PEOPLE: SOMEWHAT DIFFICULT
3. WORRYING TOO MUCH ABOUT DIFFERENT THINGS: MORE THAN HALF THE DAYS
1. FEELING NERVOUS, ANXIOUS, OR ON EDGE: SEVERAL DAYS
GAD7 TOTAL SCORE: 8

## 2023-08-22 ASSESSMENT — PATIENT HEALTH QUESTIONNAIRE - PHQ9
10. IF YOU CHECKED OFF ANY PROBLEMS, HOW DIFFICULT HAVE THESE PROBLEMS MADE IT FOR YOU TO DO YOUR WORK, TAKE CARE OF THINGS AT HOME, OR GET ALONG WITH OTHER PEOPLE: VERY DIFFICULT
SUM OF ALL RESPONSES TO PHQ QUESTIONS 1-9: 6
SUM OF ALL RESPONSES TO PHQ QUESTIONS 1-9: 6

## 2023-08-22 NOTE — RESULT ENCOUNTER NOTE
Hello,    The A1C is stable at 5.8 - still in the low prediabetic range but very good that it has not gone higher.    I'd like to see you for a physical exam - could you schedule this with me?      Yaima Jaime MD

## 2023-08-22 NOTE — PROGRESS NOTES
Colette is a 23 year old who is being evaluated via a billable video visit.      How would you like to obtain your AVS? MyChart  If the video visit is dropped, the invitation should be resent by: Send to e-mail at: [email or web address]  Will anyone else be joining your video visit? No          Assessment & Plan           Menorrhagia with irregular cycle  Plan: DDX include pcos,fibroid,endometrial hyperplasia  Advised to proceed with ultrasound .  Oral contraceptive pills to help with heavy periods.  She prefers nuva ring for ease   There are no CI for hormones,no DVT or PE history. She is a nonsmoker  Potential medication side effects were discussed with the patient; let me know if any occur.  Follow up in 3 months if periods remain heavy and irregular.  Most recent cbc is normal     - US Pelvic Transabdominal and Transvaginal; Future  - etonogestrel-ethinyl estradiol (NUVARING) 0.12-0.015 MG/24HR vaginal ring; Insert one (1) ring vaginally and leave in place for 3 consecutive weeks (21 days), then remove for 1 week.    Ordering of each unique test  I spent a total of 30 minutes on the day of the visit.   Time spent by me doing chart review, history and exam, documentation and further activities per the note           Alisia Krishnamurthy MD  [facility]   Colette is a 23 year old, presenting for the following health issues:  Establish Care and Abnormal Bleeding Problem        8/22/2023     4:12 PM   Additional Questions   Roomed by Mia VAZQUEZ       History of Present Illness       Reason for visit:  Period lasting over 4 weeks.    She eats 2-3 servings of fruits and vegetables daily.She consumes 0 sweetened beverage(s) daily.She exercises with enough effort to increase her heart rate 10 to 19 minutes per day.  She exercises with enough effort to increase her heart rate 3 or less days per week. She is missing 2 dose(s) of medications per week.  She is not taking prescribed medications  regularly due to remembering to take.       Concern - menstrual cycle issues   Onset: Since March- consistent bleeding for 3 weeks  And then last menstural period onset July 21st  Uses mary alice cup and has to change the cup twice  Description: Long menstrual cycle     Accompanying Signs & Symptoms: Fatigue   Previous history of similar problem: None    Alleviating factors:        Improved by: None  Therapies tried and outcome: None    Menarche  onset 13yo  Regular periods every 4 weeks  Lasting for 4-5 days  Average bleeding  Deos not want to be on oral contraceptive pills  Interested in nuaring             Review of Systems   Genitourinary:  Positive for menstrual problem.      Constitutional, HEENT, cardiovascular, pulmonary, GI, , musculoskeletal, neuro, skin, endocrine and psych systems are negative, except as otherwise noted.      Objective    Vitals - Patient Reported  Systolic (Patient Reported):  (n/a)  Diastolic (Patient Reported):  (n/a)  Weight (Patient Reported):  (n/a)  Height (Patient Reported):  (n/a)  SpO2 (Patient Reported):  (n/a)  Temperature (Patient Reported):  (n/a)  Pulse (Patient Reported):  (n/a)  Pain Score: No Pain (0)        Physical Exam   GENERAL: Healthy, alert and no distress  EYES: Eyes grossly normal to inspection.  No discharge or erythema, or obvious scleral/conjunctival abnormalities.  RESP: No audible wheeze, cough, or visible cyanosis.  No visible retractions or increased work of breathing.    SKIN: Visible skin clear. No significant rash, abnormal pigmentation or lesions.  NEURO: Cranial nerves grossly intact.  Mentation and speech appropriate for age.  PSYCH: Mentation appears normal, affect normal/bright, judgement and insight intact, normal speech and appearance well-groomed.                Video-Visit Details    Type of service:  Video Visit     Originating Location (pt. Location): Home    Distant Location (provider location):  On-site  Platform used for Video Visit:  AmWell

## 2023-08-25 NOTE — CONFIDENTIAL NOTE
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 (H)   Fatty liver   Hepatomegaly      Appt Date:  09.26.2023    NOTES STATUS DETAILS   OFFICE NOTE from referring provider Internal 08.19.2023 Josiane Carolina MD    OFFICE NOTES from other specialists     DISCHARGE SUMMARY from hospital     MEDICATION LIST Internal    LIVER BIOSPY (IF APPLICABLE)      PATHOLOGY REPORTS      IMAGING     ENDOSCOPY (IF AVAILABLE)     COLONOSCOPY (IF AVAILABLE)     ULTRASOUND LIVER Internal 02.10.2023 US Abd Limited   CT OF ABDOMEN     MRI OF LIVER     FIBROSCAN, US ELASTOGRAPHY, FIBROSIS SCAN, MR ELASTOGRAPHY     LABS     HEPATIC PANEL (LIVER PANEL) Internal 01.24.2023   BASIC METABOLIC PANEL     COMPLETE METABOLIC PANEL Internal 08.17.2023    COMPLETE BLOOD COUNT (CBC) Internal 08.17.2023   INTERNATIONAL NORMALIZED RATIO (INR)     HEPATITIS C ANTIBODY Internal 10.02.2021   HEPATITIS C VIRAL LOAD/PCR     HEPATITIS C GENOTYPE     HEPATITIS B SURFACE ANTIGEN Internal 10.02.2021   HEPATITIS B SURFACE ANTIBODY Internal 10.02.2021   HEPATITIS B DNA QUANT LEVEL     HEPATITIS B CORE ANTIBODY Internal 10.02.2021

## 2023-08-26 ENCOUNTER — ANCILLARY PROCEDURE (OUTPATIENT)
Dept: ULTRASOUND IMAGING | Facility: CLINIC | Age: 23
End: 2023-08-26
Attending: FAMILY MEDICINE
Payer: COMMERCIAL

## 2023-08-26 DIAGNOSIS — N92.1 MENORRHAGIA WITH IRREGULAR CYCLE: ICD-10-CM

## 2023-08-26 PROCEDURE — 76856 US EXAM PELVIC COMPLETE: CPT | Performed by: STUDENT IN AN ORGANIZED HEALTH CARE EDUCATION/TRAINING PROGRAM

## 2023-08-26 PROCEDURE — 76830 TRANSVAGINAL US NON-OB: CPT | Performed by: STUDENT IN AN ORGANIZED HEALTH CARE EDUCATION/TRAINING PROGRAM

## 2023-08-27 LAB — RADIOLOGIST FLAGS: NORMAL

## 2023-08-29 ENCOUNTER — TELEPHONE (OUTPATIENT)
Dept: FAMILY MEDICINE | Facility: CLINIC | Age: 23
End: 2023-08-29
Payer: COMMERCIAL

## 2023-08-29 DIAGNOSIS — N83.291 COMPLEX CYST OF RIGHT OVARY: Primary | ICD-10-CM

## 2023-08-29 DIAGNOSIS — N94.89 ADNEXAL MASS: ICD-10-CM

## 2023-08-29 NOTE — TELEPHONE ENCOUNTER
A.S,  X-ray calling to report an incidental finding on pelvic ultrasound.    Impression as follows:  1. Mildly complex 5.5 cm right adnexal cyst with questionable peripheral soft tissue nodularity. Recommend GYN consultation and pelvic MRI for further evaluation.    2. There are greater than 20 follicles in each ovary and the left ovary is enlarged (measurement of right ovary volume is limited due to large cyst). Findings can be seen in PCOS in the appropriate clinical setting.    Pended GYN consult and pelvic MRI if approved.  Judith IZQUIERDO RN

## 2023-09-04 ENCOUNTER — E-VISIT (OUTPATIENT)
Dept: URGENT CARE | Facility: CLINIC | Age: 23
End: 2023-09-04
Payer: COMMERCIAL

## 2023-09-04 DIAGNOSIS — R21 RASH: Primary | ICD-10-CM

## 2023-09-04 PROCEDURE — 99207 PR NON-BILLABLE SERV PER CHARTING: CPT | Performed by: NURSE PRACTITIONER

## 2023-09-05 NOTE — PATIENT INSTRUCTIONS
Dear Colette Marcos?     After reviewing your responses, I am unable to make a diagnosis that can be treated online.    You will not be charged for this eVisit.     We are dedicated to helping you achieve your best health and would like to see you in one of our many clinic locations - a primary care provider would be ideal for your concern.    Please use [employer] to schedule a visit with a provider or call [phone] ([phone]) to schedule at any of our locations.  If you can not get in within a couple days, go to urgent care for evaluation    Thanks for choosing?us?as your health care partner,?   ?   Christi Padron, CNP?

## 2023-09-08 NOTE — TELEPHONE ENCOUNTER
FYI to provider - Patient is lost to pap tracking follow-up. Attempts to contact pt have been made per reminder process and there has been no reply and/or no appt scheduled.       7/19/21 ASCUS, 21 yr old. Plan repeat cytology 6-12 months  Dx of Juvenile Rheumatoid Arthritis, taking Humira and PDN  7/28/22 NIL pap. Plan 1 yr pap  7/10/23 Reminder MyChart  8/10/23 Reminder call - left message  9/8/23 lost to follow up    Natalie Manjarrez RN, BSN

## 2023-09-15 ENCOUNTER — TELEPHONE (OUTPATIENT)
Dept: GASTROENTEROLOGY | Facility: CLINIC | Age: 23
End: 2023-09-15
Payer: COMMERCIAL

## 2023-09-17 ENCOUNTER — HEALTH MAINTENANCE LETTER (OUTPATIENT)
Age: 23
End: 2023-09-17

## 2023-09-18 ENCOUNTER — VIRTUAL VISIT (OUTPATIENT)
Dept: RHEUMATOLOGY | Facility: CLINIC | Age: 23
End: 2023-09-18
Attending: STUDENT IN AN ORGANIZED HEALTH CARE EDUCATION/TRAINING PROGRAM
Payer: COMMERCIAL

## 2023-09-18 VITALS — HEIGHT: 64 IN | BODY MASS INDEX: 35.67 KG/M2

## 2023-09-18 DIAGNOSIS — M08.40 JIA (JUVENILE IDIOPATHIC ARTHRITIS), OLIGOARTHRITIS, PERSISTENT (H): ICD-10-CM

## 2023-09-18 DIAGNOSIS — Z79.899 HIGH RISK MEDICATION USE: Primary | ICD-10-CM

## 2023-09-18 DIAGNOSIS — R79.89 ELEVATED LFTS: ICD-10-CM

## 2023-09-18 DIAGNOSIS — H20.9 UVEITIS: ICD-10-CM

## 2023-09-18 DIAGNOSIS — M08.3 JIA (JUVENILE IDIOPATHIC ARTHRITIS), POLYARTHRITIS, RHEUMAT FACTOR NEG (H): ICD-10-CM

## 2023-09-18 PROCEDURE — 99213 OFFICE O/P EST LOW 20 MIN: CPT | Mod: VID | Performed by: STUDENT IN AN ORGANIZED HEALTH CARE EDUCATION/TRAINING PROGRAM

## 2023-09-18 ASSESSMENT — PAIN SCALES - GENERAL: PAINLEVEL: NO PAIN (0)

## 2023-09-18 NOTE — PATIENT INSTRUCTIONS
Let me know how the conversation goes with your liver specialist.  Let me know if Dr. Maynard would like a change in your adalimumab dose.  Otherwise, no changes from my end.

## 2023-09-18 NOTE — LETTER
9/18/2023       RE: Colette Marcos  [address]  [place] [address]     Dear Colleague,    Thank you for referring your patient, Colette Marcos, to the [facility] at [facility]. Please see a copy of my visit note below.    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 From Arthritis perspective is doing well. However, she is undergoing a workup for fatty liver disease and recently had an ultrasound showing an ovarian cyst    ROS: 10 point ROS neg other than the symptoms noted above in the HPI.   ALLERGIES: Patient has no known allergies.   MEDS: personally reviewed, notable for Humira 40 mg every 14 days and predforte eye drops.    PRIOR RHEUM MEDS:  infliximab infusion - [id number] transitioned because went to college  Methotrexate - started in elementary school. Weekly injections. weaned off slowly first year of college, would vomit day after methotrexate an/ord often felt nauseas the next day    PMHx:  Past Medical History:   Diagnosis Date    Abnormal Pap smear of cervix 07/19/2021    OMERO (juvenile idiopathic arthritis), oligoarthritis, extended (H) 06/07/2012    OMERO (juvenile idiopathic arthritis), oligoarthritis, persistent (H) 06/07/2012    Nonsenile cataract     Uveitis     - left ankle fracture 2012  - history of positive CMV 2015    PSHx:  Past Surgical History:   Procedure Laterality Date    CATARACT IOL, RT/LT      EXAM UNDER ANESTHESIA EYE(S) Bilateral 12/9/2014    Procedure: EXAM UNDER ANESTHESIA EYE(S);  Surgeon: Ramon Manuel MD;  Location: [facility] OR    INJECT STEROID (LOCATION)      LENSECTOMY CHILD Right 12/9/2014    Procedure: LENSECTOMY CHILD;  Surgeon: Ramon Manuel MD;  Location: [facility] OR       SocHx:  Social History     Tobacco Use    Smoking status: Never     Smokeless tobacco: Never   Vaping Use    Vaping Use: Never used   Substance Use Topics    Alcohol use: No     Alcohol/week: 0.0 standard drinks of alcohol    Drug use: No   never smoker  Just finished college, majored in math, 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 No known family history of autoimmune disease    PHYSICAL EXAM  Vitals: LMP 07/22/2023 (Approximate)   BMI= There is no height or weight on file to calculate BMI.   General: NAD  HEENT: face symmetric, moist mucosa, no oral ulcers  Resp: comfortably breathing on room air  CV: warm appearing extremities    LAB REVIEW:      Latest Ref Rng & Units 4/5/2023     7:31 AM 5/26/2023     3:45 PM 8/17/2023     3:58 PM   RHEUM RESULTS   ALT 0 - 50 U/L  89  117    AST 0 - 45 U/L  55  68    Creatinine 0.51 - 0.95 mg/dL  0.86  0.80    CRP Inflammation <5.00 mg/L  7.44  6.51    GFR Estimate >60 mL/min/1.73m2  >90  >90    Hematocrit 35.0 - 47.0 %  38.3  38.1    Hemoglobin 11.7 - 15.7 g/dL 13.2  12.6  12.6    WBC 4.0 - 11.0 10e3/uL  7.5  7.1    RBC Count 3.80 - 5.20 10e6/uL  4.46  4.29    RDW 10.0 - 15.0 %  13.2  13.2    MCHC 31.5 - 36.5 g/dL  32.9  33.1    MCV 78 - 100 fL  86  89    Platelet Count 150 - 450 10e3/uL  355  315      Lab Results   Component Value Date/Time    MIKE Negative 10/02/2021 11:53 AM    RHF 9 08/20/2008 12:41 PM    CCPIGG 2.6 10/02/2021 11:52 AM     HLA B27 negative  2008    Lab Results   Component Value Date/Time    HBCAB Nonreactive 10/02/2021 11:52 AM    HEPBANG Nonreactive 10/02/2021 11:52 AM    HEPBANG Negative 08/20/2008 12:41 PM    TBRES Negative 10/02/2021 11:52 AM        IMAGING, the following have been personally reviewed:  7/2016 XR feet: Normal radiograph of the feet.  7/2016 XR wrists: Improved negative ulnar variance on the right. No new erosions.  7/2016 XR ankles: Normal right and left ankles      ASSESSMENT: 22 yo with [id number]  polyarticular OMERO involving right knee, right ankle, right midfoot, and right eye who has had stable disease on adalimumab 40 mg every 14 days. She continues to require predforte eyedrops and has elevated eye pressures. 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 I am following her ophthalmologists' (Dr. Maynard's) lead in regards to dosing for eye disease as her arthritis symptoms are very well controlled.    Of note, I have asked Colette to discuss the possibility that TNF inhibitor might be responsible for or contributing to her elevated liver enzymes. This seems unlikely given the number of years has been on them whereas liver enzyme elevation is relatively recent but this is not impossible. Given her eye disease, switching away from TNFs is complicated but her long term liver health is also very important.    PLAN:  -- repeat medication monitoring labs every 4 months  -- continue adalimumab 40 mg every 14 days   -- continue monitoring per ophthalmology  -- Colette will let me know what comes up in her discussion with hepatology    RTC in 1 year    Josiane Carolina MD, PhD  Rheumatology     20 minutes spent on the date of encounter doing chart review, history and exam, documentation, care coordination, and further activities as noted above    Virtual Visit Details  Type of service:  Video Visit   Originating Location (pt. Location): Home    Distant Location (provider location):  On-site  Platform used for Video Visit: Lisa  Start: 1:45 pm  End: 2:00 pm

## 2023-09-18 NOTE — PROGRESS NOTES
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 From Arthritis perspective is doing well. However, she is undergoing a workup for fatty liver disease and recently had an ultrasound showing an ovarian cyst    ROS: 10 point ROS neg other than the symptoms noted above in the HPI.   ALLERGIES: Patient has no known allergies.   MEDS: personally reviewed, notable for Humira 40 mg every 14 days and predforte eye drops.    PRIOR RHEUM MEDS:  infliximab infusion - [id number] transitioned because went to college  Methotrexate - started in elementary school. Weekly injections. weaned off slowly first year of college, would vomit day after methotrexate an/ord often felt nauseas the next day    PMHx:  Past Medical History:   Diagnosis Date    Abnormal Pap smear of cervix 07/19/2021    OMERO (juvenile idiopathic arthritis), oligoarthritis, extended (H) 06/07/2012    OMERO (juvenile idiopathic arthritis), oligoarthritis, persistent (H) 06/07/2012    Nonsenile cataract     Uveitis     - left ankle fracture 2012  - history of positive CMV 2015    PSHx:  Past Surgical History:   Procedure Laterality Date    CATARACT IOL, RT/LT      EXAM UNDER ANESTHESIA EYE(S) Bilateral 12/9/2014    Procedure: EXAM UNDER ANESTHESIA EYE(S);  Surgeon: Ramon Manuel MD;  [facility]    INJECT STEROID (LOCATION)      LENSECTOMY CHILD Right 12/9/2014    Procedure: LENSECTOMY CHILD;  Surgeon: Ramon Manuel MD;  [facility]: [facility] OR       SocHx:  Social History     Tobacco Use    Smoking status: Never    Smokeless tobacco: Never   Vaping Use    Vaping Use: Never used   Substance Use Topics    Alcohol use: No     Alcohol/week: 0.0 standard drinks of alcohol    Drug use: No   never smoker  Just finished college, majored in math, 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 No known family history of autoimmune disease    PHYSICAL EXAM  Vitals: LMP 07/22/2023 (Approximate)   BMI= There is no height or weight on file to calculate BMI.   General: NAD  HEENT: face symmetric, moist mucosa, no oral ulcers  Resp: comfortably breathing on room air  CV: warm appearing extremities    LAB REVIEW:      Latest Ref Rng & Units 4/5/2023     7:31 AM 5/26/2023     3:45 PM 8/17/2023     3:58 PM   RHEUM RESULTS   ALT 0 - 50 U/L  89  117    AST 0 - 45 U/L  55  68    Creatinine 0.51 - 0.95 mg/dL  0.86  0.80    CRP Inflammation <5.00 mg/L  7.44  6.51    GFR Estimate >60 mL/min/1.73m2  >90  >90    Hematocrit 35.0 - 47.0 %  38.3  38.1    Hemoglobin 11.7 - 15.7 g/dL 13.2  12.6  12.6    WBC 4.0 - 11.0 10e3/uL  7.5  7.1    RBC Count 3.80 - 5.20 10e6/uL  4.46  4.29    RDW 10.0 - 15.0 %  13.2  13.2    MCHC 31.5 - 36.5 g/dL  32.9  33.1    MCV 78 - 100 fL  86  89    Platelet Count 150 - 450 10e3/uL  355  315      Lab Results   Component Value Date/Time    MIKE Negative 10/02/2021 11:53 AM    RHF 9 08/20/2008 12:41 PM    CCPIGG 2.6 10/02/2021 11:52 AM     HLA B27 negative  2008    Lab Results   Component Value Date/Time    HBCAB Nonreactive 10/02/2021 11:52 AM    HEPBANG Nonreactive 10/02/2021 11:52 AM    HEPBANG Negative 08/20/2008 12:41 PM    TBRES Negative 10/02/2021 11:52 AM        IMAGING, the following have been personally reviewed:  7/2016 XR feet: Normal radiograph of the feet.  7/2016 XR wrists: Improved negative ulnar variance on the right. No new erosions.  7/2016 XR ankles: Normal right and left ankles      ASSESSMENT: 22 yo with [id number]- polyarticular OMERO involving right knee, right ankle, right midfoot, and right eye who has had stable disease on adalimumab 40 mg every 14 days. She continues to require predforte eyedrops and has elevated eye pressures. Symptom onset 2002, diagnosed 2005, seronegative polyarticular OMERO with history of uveitis &  iritis (2016) and iridocyclitis (2014). I am following her ophthalmologists' (Dr. Maynard's) lead in regards to dosing for eye disease as her arthritis symptoms are very well controlled.    Of note, I have asked Colette to discuss the possibility that TNF inhibitor might be responsible for or contributing to her elevated liver enzymes. This seems unlikely given the number of years has been on them whereas liver enzyme elevation is relatively recent but this is not impossible. Given her eye disease, switching away from TNFs is complicated but her long term liver health is also very important.    PLAN:  -- repeat medication monitoring labs every 4 months  -- continue adalimumab 40 mg every 14 days   -- continue monitoring per ophthalmology  -- Colette will let me know what comes up in her discussion with hepatology    RTC in 1 year    Josiane Carolina MD, PhD  Rheumatology     20 minutes spent on the date of encounter doing chart review, history and exam, documentation, care coordination, and further activities as noted above    Virtual Visit Details  Type of service:  Video Visit   Originating Location (pt. Location): Home    Distant Location (provider location):  On-site  Platform used for Video Visit: Lisa  Start: 1:45 pm  End: 2:00 pm

## 2023-09-18 NOTE — NURSING NOTE
Is the patient currently in the state of [place]? YES    Visit mode:VIDEO    If the visit is dropped, the patient can be reconnected by: VIDEO VISIT: Text to cell phone:   Telephone Information:   Mobile [phone]       Will anyone else be joining the visit? NO  (If patient encounters technical issues they should call [phone])    How would you like to obtain your AVS? MyChart    Are changes needed to the allergy or medication list? No    Reason for visit: RECHECK    Medication and allergies have been reviewed.     Nicholas LOPEZ

## 2023-09-26 ENCOUNTER — PRE VISIT (OUTPATIENT)
Dept: GASTROENTEROLOGY | Facility: CLINIC | Age: 23
End: 2023-09-26
Payer: COMMERCIAL

## 2023-09-27 ENCOUNTER — LAB (OUTPATIENT)
Dept: LAB | Facility: CLINIC | Age: 23
End: 2023-09-27
Payer: COMMERCIAL

## 2023-09-27 ENCOUNTER — OFFICE VISIT (OUTPATIENT)
Dept: GASTROENTEROLOGY | Facility: CLINIC | Age: 23
End: 2023-09-27
Attending: STUDENT IN AN ORGANIZED HEALTH CARE EDUCATION/TRAINING PROGRAM
Payer: COMMERCIAL

## 2023-09-27 VITALS
DIASTOLIC BLOOD PRESSURE: 85 MMHG | TEMPERATURE: 98.3 F | HEART RATE: 77 BPM | SYSTOLIC BLOOD PRESSURE: 135 MMHG | OXYGEN SATURATION: 95 % | RESPIRATION RATE: 22 BRPM

## 2023-09-27 DIAGNOSIS — M08.3 JIA (JUVENILE IDIOPATHIC ARTHRITIS), POLYARTHRITIS, RHEUMAT FACTOR NEG (H): ICD-10-CM

## 2023-09-27 DIAGNOSIS — Z79.899 HIGH RISK MEDICATION USE: ICD-10-CM

## 2023-09-27 DIAGNOSIS — R73.03 PREDIABETES: ICD-10-CM

## 2023-09-27 DIAGNOSIS — R79.89 ABNORMAL LIVER FUNCTION TESTS: ICD-10-CM

## 2023-09-27 DIAGNOSIS — R79.89 ABNORMAL LIVER FUNCTION TESTS: Primary | ICD-10-CM

## 2023-09-27 LAB
ALBUMIN SERPL BCG-MCNC: 4.3 G/DL (ref 3.5–5.2)
ALP SERPL-CCNC: 65 U/L (ref 35–104)
ALT SERPL W P-5'-P-CCNC: 50 U/L (ref 0–50)
ANION GAP SERPL CALCULATED.3IONS-SCNC: 11 MMOL/L (ref 7–15)
AST SERPL W P-5'-P-CCNC: 34 U/L (ref 0–45)
BILIRUB DIRECT SERPL-MCNC: <0.2 MG/DL (ref 0–0.3)
BILIRUB SERPL-MCNC: 0.2 MG/DL
BUN SERPL-MCNC: 10.9 MG/DL (ref 6–20)
CALCIUM SERPL-MCNC: 9.4 MG/DL (ref 8.6–10)
CHLORIDE SERPL-SCNC: 102 MMOL/L (ref 98–107)
CREAT SERPL-MCNC: 0.77 MG/DL (ref 0.51–0.95)
DEPRECATED HCO3 PLAS-SCNC: 24 MMOL/L (ref 22–29)
EGFRCR SERPLBLD CKD-EPI 2021: >90 ML/MIN/1.73M2
ERYTHROCYTE [DISTWIDTH] IN BLOOD BY AUTOMATED COUNT: 13 % (ref 10–15)
GLUCOSE SERPL-MCNC: 132 MG/DL (ref 70–99)
HCT VFR BLD AUTO: 37.9 % (ref 35–47)
HGB BLD-MCNC: 12.5 G/DL (ref 11.7–15.7)
INR PPP: 1.01 (ref 0.85–1.15)
MCH RBC QN AUTO: 28.4 PG (ref 26.5–33)
MCHC RBC AUTO-ENTMCNC: 33 G/DL (ref 31.5–36.5)
MCV RBC AUTO: 86 FL (ref 78–100)
PLATELET # BLD AUTO: 336 10E3/UL (ref 150–450)
POTASSIUM SERPL-SCNC: 4 MMOL/L (ref 3.4–5.3)
PROT SERPL-MCNC: 8 G/DL (ref 6.4–8.3)
RBC # BLD AUTO: 4.4 10E6/UL (ref 3.8–5.2)
SODIUM SERPL-SCNC: 137 MMOL/L (ref 135–145)
WBC # BLD AUTO: 6.7 10E3/UL (ref 4–11)

## 2023-09-27 PROCEDURE — 80053 COMPREHEN METABOLIC PANEL: CPT | Performed by: PATHOLOGY

## 2023-09-27 PROCEDURE — 85027 COMPLETE CBC AUTOMATED: CPT | Performed by: PATHOLOGY

## 2023-09-27 PROCEDURE — 99204 OFFICE O/P NEW MOD 45 MIN: CPT | Performed by: INTERNAL MEDICINE

## 2023-09-27 PROCEDURE — G0463 HOSPITAL OUTPT CLINIC VISIT: HCPCS | Performed by: INTERNAL MEDICINE

## 2023-09-27 PROCEDURE — 99000 SPECIMEN HANDLING OFFICE-LAB: CPT | Performed by: PATHOLOGY

## 2023-09-27 PROCEDURE — 85610 PROTHROMBIN TIME: CPT | Performed by: PATHOLOGY

## 2023-09-27 PROCEDURE — 86364 TISS TRNSGLTMNASE EA IG CLAS: CPT | Performed by: INTERNAL MEDICINE

## 2023-09-27 PROCEDURE — 82248 BILIRUBIN DIRECT: CPT | Performed by: PATHOLOGY

## 2023-09-27 PROCEDURE — 36415 COLL VENOUS BLD VENIPUNCTURE: CPT | Performed by: PATHOLOGY

## 2023-09-27 ASSESSMENT — PAIN SCALES - GENERAL: PAINLEVEL: NO PAIN (0)

## 2023-09-27 NOTE — LETTER
9/27/2023         RE: Colette Marcos  [address]  [facility] [id number]        Dear Colleague,    Thank you for referring your patient, Colette Marcos, to the [facility] [place]. Please see a copy of my visit note below.    [facility] Hepatology    New Patient Visit    Referring provider:  Josiane Carolina    23 year old female    Chief complaint:  Abnormal LFTs, hepatic steatosis    HPI:  Patient presents for further evaluation and management of abnormal liver function tests.  Liver function tests have been mildly abnormal in the past in 2015.  Abdominal ultrasound in February 2023 showed increased echogenicity and hepatomegaly.  Testing for hepatitis B and C have been negative in the past.    Patient is well today.  She has no symptoms related to liver disease.    Patient denies jaundice, abdominal distension, lower extremity edema, lethargy or confusion.    No history of melena, hematemesis or hematochezia.    Patient denies fevers, sweats, chills    No recent weight loss or gain.  Appetite is normal.    History of obesity and prediabetes noted.  No known history of CAD, CVA, PAD, NI hypertension or hyperlipidemia.    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  RA affects ankles and knees and is well controlled on Humira, started in 2018 for uveitis.    Patient drinks 1 alcoholic drink per month.  She denies any history of DUIs or alcohol use disorder.    Patient is never smoked.  No history of illicit drug use including marijuana, IN or IV drugs.    Medical hx Surgical hx   Past Medical History:   Diagnosis Date    Abnormal Pap smear of cervix 07/19/2021    Depression with anxiety     OMERO (juvenile idiopathic arthritis), oligoarthritis, persistent (H) 06/07/2012    Nonsenile cataract     Obesity     Prediabetes     Uveitis       Past Surgical History:   Procedure Laterality Date    CATARACT IOL, RT/LT      EXAM UNDER ANESTHESIA EYE(S) Bilateral  12/09/2014    Procedure: EXAM UNDER ANESTHESIA EYE(S);  Surgeon: Ramon Manuel MD;  Location: [facility] OR    INJECT STEROID (LOCATION)      LENSECTOMY CHILD Right 12/09/2014    Procedure: LENSECTOMY CHILD;  Surgeon: Ramon Manuel MD;  Location: [facility] OR    WISDOM TOOTH EXTRACTION            Medications  Current Outpatient Medications   Medication Sig Dispense Refill    adalimumab (HUMIRA *CF*) 40 MG/0.4ML pen kit Inject 0.4 mLs (40 mg) Subcutaneous every 14 days Hold for signs of infection, then seek medical attention. 2 each 0    etonogestrel-ethinyl estradiol (NUVARING) 0.12-0.015 MG/24HR vaginal ring Insert one (1) ring vaginally and leave in place for 3 consecutive weeks (21 days), then remove for 1 week. 1 each 11    metFORMIN (GLUCOPHAGE XR) 500 MG 24 hr tablet Take 1 tablet (500 mg) by mouth daily (with dinner) 90 tablet 1    prednisoLONE acetate (PRED FORTE) 1 % ophthalmic suspension Place 1 drop into the right eye 3 times daily 5 mL 2    sertraline (ZOLOFT) 100 MG tablet Take 1 tablet (100 mg) by mouth daily 90 tablet 3       Allergies  No Known Allergies    Family hx Social hx   Family History   Problem Relation Age of Onset    Macular Degeneration Paternal Grandfather     Cerebrovascular Disease Paternal Grandfather     Amblyopia No family hx of     Strabismus No family hx of     Glaucoma No family hx of     Retinal detachment No family hx of     Liver Disease No family hx of     Colon Cancer No family hx of       Social History     Tobacco Use    Smoking status: Never    Smokeless tobacco: Never   Vaping Use    Vaping Use: Never used   Substance Use Topics    Alcohol use: No     Alcohol/week: 0.0 standard drinks of alcohol    Drug use: No     Patient lives in [place] with her partner.  No children.  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     Review of systems  A 10-point review of systems was negative.    Examination  /85 (BP Location: Right arm, Patient Position: Sitting,  Cuff Size: Adult Large)   Pulse 77   Temp 98.3  F (36.8  C) (Oral)   Resp 22   LMP 07/22/2023 (Approximate)   SpO2 95%   There is no height or weight on file to calculate BMI.    Gen- well, NAD, A+Ox3, normal color  Eye- EOMI  ENT- MMM, normal oropharynx  Lym- no palpable lymphadenopathy  CVS- S1, S2 normal, no added sounds, RRR  RS- CTA  Abd- overweight, striae, soft, non-tender, palpable hepatomegaly, no ascites or splenomegaly on palpation or percussion, BS+  Extr- pulses good, no DRU  MS- hands normal- no clubbing  Neuro- A+Ox3, no asterixis  Skin- tattoos, no rash or jaundice  Psych- normal mood    Laboratory  Lab Results   Component Value Date     07/28/2022     06/05/2017    POTASSIUM 3.9 07/28/2022    POTASSIUM 4.3 06/05/2017    CHLORIDE 104 07/28/2022    CHLORIDE 107 06/05/2017    CO2 28 07/28/2022    CO2 24 06/05/2017    BUN 8 07/28/2022    BUN 13 06/05/2017    CR 0.80 08/17/2023    CR 0.78 05/29/2019       Lab Results   Component Value Date    BILITOTAL 0.2 01/24/2023    BILITOTAL 0.2 05/29/2019     08/17/2023    ALT 29 05/29/2019    AST 68 08/17/2023    AST 23 05/29/2019    ALKPHOS 76 08/17/2023    ALKPHOS 61 05/29/2019       Lab Results   Component Value Date    ALBUMIN 4.1 01/24/2023    ALBUMIN 3.9 07/28/2022    ALBUMIN 3.9 05/29/2019    PROTTOTAL 7.4 01/24/2023    PROTTOTAL 7.6 05/29/2019        Lab Results   Component Value Date    WBC 7.1 08/17/2023    WBC 8.4 10/20/2020    HGB 12.6 08/17/2023    HGB 12.1 10/20/2020    MCV 89 08/17/2023    MCV 91 05/29/2019     08/17/2023     10/20/2020       No results found for: INR     10/02/21 11:52   Hep B Surface Agn Nonreactive   Hepatitis B Core Zunilda Nonreactive   Hepatitis B Surface Antibody Instrument Value 6.49   Hepatitis C Antibody Nonreactive     Radiology  Abd U/S Feb 2023 reviewed    Assessment  23 year old female with history of juvenile rheumatoid arthritis and metabolic syndrome who presents for further  evaluation and management of 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  Will rule out other etiologies of chronic liver disease including celiac disease.  No clinical, biochemical or radiographic evidence of cirrhosis.  Fib 4 = 0.48, indicating low likelihood of advanced liver disease.      We discussed the pathophysiology, complications and management of MASLD, which is primarily with weight loss via diet and exercise as well as risk factor modification.    Plan  Check CMP, INR, CBC  Check TTG Ab  Weight loss with diet and exercise  Follow-up with PCP    Aman Gray MD  Hepatology  [facility]          Again, thank you for allowing me to participate in the care of your patient.        Sincerely,        Aman Gray MD

## 2023-09-27 NOTE — PROGRESS NOTES
[facility] Hepatology    New Patient Visit    Referring provider:  Josiane Carolina    23 year old female    Chief complaint:  Abnormal LFTs, hepatic steatosis    HPI:  Patient presents for further evaluation and management of abnormal liver function tests.  Liver function tests have been mildly abnormal in the past in 2015.  Abdominal ultrasound in February 2023 showed increased echogenicity and hepatomegaly.  Testing for hepatitis B and C have been negative in the past.    Patient is well today.  She has no symptoms related to liver disease.    Patient denies jaundice, abdominal distension, lower extremity edema, lethargy or confusion.    No history of melena, hematemesis or hematochezia.    Patient denies fevers, sweats, chills    No recent weight loss or gain.  Appetite is normal.    History of obesity and prediabetes noted.  No known history of CAD, CVA, PAD, NI hypertension or hyperlipidemia.    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  RA affects ankles and knees and is well controlled on Humira, started in 2018 for uveitis.    Patient drinks 1 alcoholic drink per month.  She denies any history of DUIs or alcohol use disorder.    Patient is never smoked.  No history of illicit drug use including marijuana, IN or IV drugs.    Medical hx Surgical hx   Past Medical History:   Diagnosis Date     Abnormal Pap smear of cervix 07/19/2021     Depression with anxiety      OMERO (juvenile idiopathic arthritis), oligoarthritis, persistent (H) 06/07/2012     Nonsenile cataract      Obesity      Prediabetes      Uveitis       Past Surgical History:   Procedure Laterality Date     CATARACT IOL, RT/LT       EXAM UNDER ANESTHESIA EYE(S) Bilateral 12/09/2014    Procedure: EXAM UNDER ANESTHESIA EYE(S);  Surgeon: Ramon Manuel MD;  [facility]     INJECT STEROID (LOCATION)       LENSECTOMY CHILD Right 12/09/2014    Procedure: LENSECTOMY CHILD;  Surgeon: Ramon Manuel MD;  [facility] OR      WISDOM TOOTH EXTRACTION            Medications  Current Outpatient Medications   Medication Sig Dispense Refill     adalimumab (HUMIRA *CF*) 40 MG/0.4ML pen kit Inject 0.4 mLs (40 mg) Subcutaneous every 14 days Hold for signs of infection, then seek medical attention. 2 each 0     etonogestrel-ethinyl estradiol (NUVARING) 0.12-0.015 MG/24HR vaginal ring Insert one (1) ring vaginally and leave in place for 3 consecutive weeks (21 days), then remove for 1 week. 1 each 11     metFORMIN (GLUCOPHAGE XR) 500 MG 24 hr tablet Take 1 tablet (500 mg) by mouth daily (with dinner) 90 tablet 1     prednisoLONE acetate (PRED FORTE) 1 % ophthalmic suspension Place 1 drop into the right eye 3 times daily 5 mL 2     sertraline (ZOLOFT) 100 MG tablet Take 1 tablet (100 mg) by mouth daily 90 tablet 3       Allergies  No Known Allergies    Family hx Social hx   Family History   Problem Relation Age of Onset     Macular Degeneration Paternal Grandfather      Cerebrovascular Disease Paternal Grandfather      Amblyopia No family hx of      Strabismus No family hx of      Glaucoma No family hx of      Retinal detachment No family hx of      Liver Disease No family hx of      Colon Cancer No family hx of       Social History     Tobacco Use     Smoking status: Never     Smokeless tobacco: Never   Vaping Use     Vaping Use: Never used   Substance Use Topics     Alcohol use: No     Alcohol/week: 0.0 standard drinks of alcohol     Drug use: No     Patient lives in [place] with her partner.  No children.  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     Review of systems  A 10-point review of systems was negative.    Examination  /85 (BP Location: Right arm, Patient Position: Sitting, Cuff Size: Adult Large)   Pulse 77   Temp 98.3  F (36.8  C) (Oral)   Resp 22   LMP 07/22/2023 (Approximate)   SpO2 95%   There is no height or weight on file to calculate BMI.    Gen- well, NAD, A+Ox3, normal color  Eye- EOMI  ENT-  MMM, normal oropharynx  Lym- no palpable lymphadenopathy  CVS- S1, S2 normal, no added sounds, RRR  RS- CTA  Abd- overweight, striae, soft, non-tender, palpable hepatomegaly, no ascites or splenomegaly on palpation or percussion, BS+  Extr- pulses good, no DRU  MS- hands normal- no clubbing  Neuro- A+Ox3, no asterixis  Skin- tattoos, no rash or jaundice  Psych- normal mood    Laboratory  Lab Results   Component Value Date     07/28/2022     06/05/2017    POTASSIUM 3.9 07/28/2022    POTASSIUM 4.3 06/05/2017    CHLORIDE 104 07/28/2022    CHLORIDE 107 06/05/2017    CO2 28 07/28/2022    CO2 24 06/05/2017    BUN 8 07/28/2022    BUN 13 06/05/2017    CR 0.80 08/17/2023    CR 0.78 05/29/2019       Lab Results   Component Value Date    BILITOTAL 0.2 01/24/2023    BILITOTAL 0.2 05/29/2019     08/17/2023    ALT 29 05/29/2019    AST 68 08/17/2023    AST 23 05/29/2019    ALKPHOS 76 08/17/2023    ALKPHOS 61 05/29/2019       Lab Results   Component Value Date    ALBUMIN 4.1 01/24/2023    ALBUMIN 3.9 07/28/2022    ALBUMIN 3.9 05/29/2019    PROTTOTAL 7.4 01/24/2023    PROTTOTAL 7.6 05/29/2019        Lab Results   Component Value Date    WBC 7.1 08/17/2023    WBC 8.4 10/20/2020    HGB 12.6 08/17/2023    HGB 12.1 10/20/2020    MCV 89 08/17/2023    MCV 91 05/29/2019     08/17/2023     10/20/2020       No results found for: INR     10/02/21 11:52   Hep B Surface Agn Nonreactive   Hepatitis B Core Zunilda Nonreactive   Hepatitis B Surface Antibody Instrument Value 6.49   Hepatitis C Antibody Nonreactive     Radiology  Abd U/S Feb 2023 reviewed    Assessment  23 year old female with history of juvenile rheumatoid arthritis and metabolic syndrome who presents for further evaluation and management of 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  Will rule out other etiologies of chronic liver disease including celiac disease.  No clinical, biochemical or radiographic evidence of cirrhosis.  Fib 4 = 0.48,  indicating low likelihood of advanced liver disease.      We discussed the pathophysiology, complications and management of MASLD, which is primarily with weight loss via diet and exercise as well as risk factor modification.    Plan  1. Check CMP, INR, CBC  2. Check TTG Ab  3. Weight loss with diet and exercise  4. Follow-up with PCP    Aman Gray MD  Hepatology  [facility]

## 2023-09-27 NOTE — NURSING NOTE
"Chief Complaint   Patient presents with    Consult     Elevated LFT's and abnormal liver imaging.      Vital signs:  Temp: 98.3  F (36.8  C) Temp src: Oral BP: 135/85 Pulse: 77   Resp: 22 SpO2: 95 %       Weight:  (declined)  Estimated body mass index is 35.67 kg/m  as calculated from the following:    Height as of 9/18/23: 1.626 m (5' 4\").    Weight as of 7/28/22: 94.3 kg (207 lb 12.8 oz).      Zainab Weaver, [facility]  9/27/2023 2:45 PM    "

## 2023-09-28 ENCOUNTER — ANCILLARY PROCEDURE (OUTPATIENT)
Dept: MRI IMAGING | Facility: CLINIC | Age: 23
End: 2023-09-28
Attending: FAMILY MEDICINE
Payer: COMMERCIAL

## 2023-09-28 DIAGNOSIS — N83.291 COMPLEX CYST OF RIGHT OVARY: ICD-10-CM

## 2023-09-28 DIAGNOSIS — N94.89 ADNEXAL MASS: ICD-10-CM

## 2023-09-28 PROCEDURE — A9585 GADOBUTROL INJECTION: HCPCS | Mod: JZ | Performed by: RADIOLOGY

## 2023-09-28 PROCEDURE — 72197 MRI PELVIS W/O & W/DYE: CPT | Mod: GC | Performed by: RADIOLOGY

## 2023-09-28 RX ORDER — GADOBUTROL 604.72 MG/ML
10 INJECTION INTRAVENOUS ONCE
Status: COMPLETED | OUTPATIENT
Start: 2023-09-28 | End: 2023-09-28

## 2023-09-28 RX ORDER — METFORMIN HCL 500 MG
500 TABLET, EXTENDED RELEASE 24 HR ORAL
Qty: 30 TABLET | Refills: 0 | Status: SHIPPED | OUTPATIENT
Start: 2023-09-28 | End: 2023-10-31

## 2023-09-28 RX ADMIN — GADOBUTROL 9.5 ML: 604.72 INJECTION INTRAVENOUS at 18:03

## 2023-09-28 NOTE — TELEPHONE ENCOUNTER
Medication is being filled for 1 time refill only due to:  Patient needs to be seen because due for physical .    Last 7/28/23.    Pam Galindo RN

## 2023-09-29 LAB
TTG IGA SER-ACNC: 1.5 U/ML
TTG IGG SER-ACNC: 0.9 U/ML

## 2023-10-09 ENCOUNTER — NURSE TRIAGE (OUTPATIENT)
Dept: FAMILY MEDICINE | Facility: CLINIC | Age: 23
End: 2023-10-09
Payer: COMMERCIAL

## 2023-10-09 NOTE — TELEPHONE ENCOUNTER
I am not in clinic for the next 2 days - can she be seen in urgent care or with another provider?    SN

## 2023-10-09 NOTE — TELEPHONE ENCOUNTER
"Pt stating that she recently started to use Nuva ring. A week ago she started to have light bleeding. Now she thinks that the ring has fallen out. Pt states that she now has a heavy period and is painful. Pt put in a new ring yesterday, but continues to have \"heavy bleeding.\" Pt currently using tampons. Pt was told to use pads to get more of an accurate amount of the bleeding. Pt was told to call back if she started saturating 2 pads within a 2 hour period or started to have clots that were larger than a golf ball size.     Pt states she does have an appointment with a GYN provider that is coming up, but has not seen one yet.   Reason for Disposition   Patient wants to be seen    Additional Information   Negative: SEVERE vaginal bleeding (e.g., continuous red blood from vagina, or large blood clots) and very weak (can't stand)   Negative: Passed out (i.e., fainted, collapsed and was not responding)   Negative: Difficult to awaken or acting confused (e.g., disoriented, slurred speech)   Negative: Shock suspected (e.g., cold/pale/clammy skin, too weak to stand, low BP, rapid pulse)   Negative: Sounds like a life-threatening emergency to the triager   Negative: Pregnant 20 or more weeks (5 months or more)   Negative: Pregnant < 20 weeks (less than 5 months)   Negative: Postpartum (from 0 to 6 weeks after delivery)   Negative: Vaginal discharge is main symptom and bleeding is slight   Negative: SEVERE abdominal pain (e.g., excruciating)   Negative: SEVERE dizziness (e.g., unable to stand, requires support to walk, feels like passing out now)   Negative: SEVERE vaginal bleeding (e.g., soaking 2 pads or tampons per hour and present 2 or more hours; 1 menstrual cup every 2 hours)   Negative: Patient sounds very sick or weak to the triager   Negative: MODERATE vaginal bleeding (i.e., soaking pad or tampon per hour and present > 6 hours; 1 menstrual cup every 6 hours)   Negative: Constant abdominal pain lasting > 2 hours   " "Negative: Pale skin (pallor) of new-onset or worsening    Answer Assessment - Initial Assessment Questions  1. AMOUNT: \"Describe the bleeding that you are having.\"     - SPOTTING: spotting, or pinkish / brownish mucous discharge; does not fill panty liner or pad     - MILD:  less than 1 pad / hour; less than patient's usual menstrual bleeding    - MODERATE: 1-2 pads / hour; 1 menstrual cup every 6 hours; small-medium blood clots (e.g., pea, grape, small coin)    - SEVERE: soaking 2 or more pads/hour for 2 or more hours; 1 menstrual cup every 2 hours; bleeding not contained by pads or continuous red blood from vagina; large blood clots (e.g., golf ball, large coin)       Unable to tell being that she has been wearing tampons  2. ONSET: \"When did the bleeding begin?\" \"Is it continuing now?\"      It started as light spotting yesterday  3. MENSTRUAL PERIOD: \"When was the last normal menstrual period?\" \"How is this different than your period?\"      Have not been regular since starting the ring  4. REGULARITY: \"How regular are your periods?\"      not  5. ABDOMEN PAIN: \"Do you have any pain?\" \"How bad is the pain?\"  (e.g., Scale 1-10; mild, moderate, or severe)    - MILD (1-3): doesn't interfere with normal activities, abdomen soft and not tender to touch     - MODERATE (4-7): interferes with normal activities or awakens from sleep, abdomen tender to touch     - SEVERE (8-10): excruciating pain, doubled over, unable to do any normal activities       Moderate  6. PREGNANCY: \"Is there any chance you are pregnant?\" \"When was your last menstrual period?\"      no  7. BREASTFEEDING: \"Are you breastfeeding?\"      no  8. HORMONE MEDICINES: \"Are you taking any hormone medicines, prescription or over-the-counter?\" (e.g., birth control pills, estrogen)      Nuva ring  9. BLOOD THINNER MEDICINES: \"Do you take any blood thinners?\" (e.g., Coumadin / warfarin, Pradaxa / dabigatran, aspirin)      no  10. CAUSE: \"What do you think is " "causing the bleeding?\" (e.g., recent gyn surgery, recent gyn procedure; known bleeding disorder, cervical cancer, polycystic ovarian disease, fibroids)          Unkown  11. HEMODYNAMIC STATUS: \"Are you weak or feeling lightheaded?\" If Yes, ask: \"Can you stand and walk normally?\"         Is not light headed. She can walk  12. OTHER SYMPTOMS: \"What other symptoms are you having with the bleeding?\" (e.g., passed tissue, vaginal discharge, fever, menstrual-type cramps)        cramps    Protocols used: Vaginal Bleeding - [id number]  Chloé Manjarrez RN  [facility]   "

## 2023-10-09 NOTE — TELEPHONE ENCOUNTER
Pt calling the clinic back. Please call pt back. Thanks    Chloé Manjarrez RN  [facility]

## 2023-10-16 ENCOUNTER — OFFICE VISIT (OUTPATIENT)
Dept: OBGYN | Facility: CLINIC | Age: 23
End: 2023-10-16
Payer: COMMERCIAL

## 2023-10-16 VITALS — SYSTOLIC BLOOD PRESSURE: 138 MMHG | BODY MASS INDEX: 35.67 KG/M2 | DIASTOLIC BLOOD PRESSURE: 76 MMHG | HEIGHT: 64 IN

## 2023-10-16 DIAGNOSIS — N80.03 ADENOMYOSIS: ICD-10-CM

## 2023-10-16 DIAGNOSIS — Z23 NEED FOR PROPHYLACTIC VACCINATION AND INOCULATION AGAINST INFLUENZA: ICD-10-CM

## 2023-10-16 DIAGNOSIS — N93.9 ABNORMAL UTERINE BLEEDING (AUB): ICD-10-CM

## 2023-10-16 DIAGNOSIS — E28.2 PCOS (POLYCYSTIC OVARIAN SYNDROME): ICD-10-CM

## 2023-10-16 DIAGNOSIS — R87.610 ATYPICAL SQUAMOUS CELLS OF UNDETERMINED SIGNIFICANCE (ASCUS) ON PAPANICOLAOU SMEAR OF CERVIX: ICD-10-CM

## 2023-10-16 DIAGNOSIS — N83.201 CYST OF RIGHT OVARY: Primary | ICD-10-CM

## 2023-10-16 PROCEDURE — 99204 OFFICE O/P NEW MOD 45 MIN: CPT | Mod: 25 | Performed by: STUDENT IN AN ORGANIZED HEALTH CARE EDUCATION/TRAINING PROGRAM

## 2023-10-16 PROCEDURE — 90686 IIV4 VACC NO PRSV 0.5 ML IM: CPT | Performed by: STUDENT IN AN ORGANIZED HEALTH CARE EDUCATION/TRAINING PROGRAM

## 2023-10-16 PROCEDURE — 90471 IMMUNIZATION ADMIN: CPT | Performed by: STUDENT IN AN ORGANIZED HEALTH CARE EDUCATION/TRAINING PROGRAM

## 2023-10-16 NOTE — PROGRESS NOTES
[facility]  OB/GYN Clinic Note    SUBJECTIVE:                                                   Colette Marcos is a 23 year old female who presents to clinic today for the following health issue(s):  Patient presents with:  Consult: Discuss recent MRI and Pelvic US results  Imm/Inj: Flu Shot    HPI:  Patient was seen by PCP regarding irregular menstrual cycle. Had only a few menses this year. US done showing right ovarian cyst. Otherwise no concerning endometrial findings.   Started on Nuvaring. Did have a very heavy bleeding a week ago. Seen in urgent care. Given Toradol, which stopped bleeding and helped with pain.   Denies abdominal pain.   STD testing- declines. Due for ASCUS pap follow-up.     Patient's last menstrual period was 10/02/2023 (approximate).  Patient is sexually active, No obstetric history on file.  Using Nuvaring for contraception. Partner is not male. Not using for pregnancy prevention.    reports that she has never smoked. She has never used smokeless tobacco.  STD testing offered?  Declined  Health maintenance reviewed.  Patient requests flu vaccine.    Problem list and histories reviewed & adjusted, as indicated.  Additional history: as documented.    Patient Active Problem List   Diagnosis    Juvenile rheumatoid arthritis (H)    Secondary iridocyclitis, noninfectious    Cystoid macular degeneration of retina    Chondromalacia of patella    Cataract    Adjustment disorder with depressed mood    Uveitis    Iritis    Atypical squamous cells of undetermined significance (ASCUS) on Papanicolaou smear of cervix    PMDD (premenstrual dysphoric disorder)     Past Surgical History:   Procedure Laterality Date    CATARACT IOL, RT/LT      EXAM UNDER ANESTHESIA EYE(S) Bilateral 12/09/2014    Procedure: EXAM UNDER ANESTHESIA EYE(S);  Surgeon: Ramon Manuel MD;  Location: [facility] OR    INJECT STEROID (LOCATION)      LENSECTOMY CHILD Right 12/09/2014    Procedure: LENSECTOMY CHILD;   "Surgeon: Ramon Manuel MD;  Location: [facility] OR    WISDOM TOOTH EXTRACTION        Social History     Tobacco Use    Smoking status: Never    Smokeless tobacco: Never   Substance Use Topics    Alcohol use: No     Alcohol/week: 0.0 standard drinks of alcohol      Problem (# of Occurrences) Relation (Name,Age of Onset)    Cerebrovascular Disease (1) Paternal Grandfather    Macular Degeneration (1) Paternal Grandfather           Negative family history of: Amblyopia, Strabismus, Glaucoma, Retinal detachment, Liver Disease, Colon Cancer              Current Outpatient Medications   Medication Sig    adalimumab (HUMIRA *CF*) 40 MG/0.4ML pen kit Inject 0.4 mLs (40 mg) Subcutaneous every 14 days Hold for signs of infection, then seek medical attention.    etonogestrel-ethinyl estradiol (NUVARING) 0.12-0.015 MG/24HR vaginal ring Insert one (1) ring vaginally and leave in place for 3 consecutive weeks (21 days), then remove for 1 week.    metFORMIN (GLUCOPHAGE XR) 500 MG 24 hr tablet TAKE 1 TABLET(500 MG) BY MOUTH DAILY WITH DINNER    prednisoLONE acetate (PRED FORTE) 1 % ophthalmic suspension Place 1 drop into the right eye 3 times daily    sertraline (ZOLOFT) 100 MG tablet Take 1 tablet (100 mg) by mouth daily     No current facility-administered medications for this visit.     No Known Allergies          OBJECTIVE:     /76 (BP Location: Right arm, Cuff Size: Adult Large)   Ht 1.626 m (5' 4\")   LMP 10/02/2023 (Approximate)   BMI 35.67 kg/m    Body mass index is 35.67 kg/m .    Exam:  Constitutional:  Appearance: Well nourished, well developed alert, in no acute distress   Pelvic: Normal external genitalia, normal vagina. Unable to visualize cervix due to discomfort. Pap not obtained.     US PELVIC TRANSABDOMINAL AND TRANSVAGINAL 08/26/2023    Narrative  EXAMINATION: US PELVIC TRANSABDOMINAL AND TRANSVAGINAL, 8/26/2023 7:41  AM    COMPARISON: None.    HISTORY: menorrhagia , rule out fibroid, vs, PCOS, vs " endometrial  hyperplasia; Menorrhagia with irregular cycle. LMP 7/21.    TECHNIQUE: The pelvis was scanned in standard fashion with  transabdominal and transvaginal transducer(s) using both grey scale  and limited color Doppler techniques.    FINDINGS:  The uterus measures 7.5 x 4.0 x 3.2 cm, and there is no evidence of a  focal fibroid.  The endometrium is within normal limits and measures  10 mm. There is swirling of hyperechoic debris within the endometrium  on cine images, possibly blood products. Nabothian cysts with the  largest measuring 1.2 cm. Contraceptive device in the vagina. There is  no free fluid in the pelvis.    Right ovary: Measures 8.8 x 6.1 x 5.3 cm (volume 148 cc). There is a  5.5 x 5.0 x 5.2 cm mildly complex right adnexal cyst with some  internal debris and questionable peripheral soft tissue nodularity on  cine images. There are >20 follicles. Normal blood flow.    Left ovary: Measures 4.4 x 2.8 x 2.7 cm (volume 17 cc). There are >20  follicles. Normal blood flow.    Impression  IMPRESSION:  1. Mildly complex 5.5 cm right adnexal cyst with questionable  peripheral soft tissue nodularity. Recommend GYN consultation and  pelvic MRI for further evaluation.  2. There are greater than 20 follicles in each ovary and the left  ovary is enlarged (measurement of right ovary volume is limited due to  large cyst). Findings can be seen in PCOS in the appropriate clinical  setting.    [Consider Follow Up: Ovarian cyst]    This report will be copied to the [facility] to ensure a  provider acknowledges the finding. Access Center is available Monday  through Friday 8am-3:30 pm.    CHRISTOPH VENCES MD      SYSTEM ID:  [id number]    MR PELVIS (GYN) W/O & W CONTRAST 09/28/2023    Narrative  EXAMINATION: MR PELVIS (GYN) W/O & W CONTRAST, 9/28/2023 5:31 PM    COMPARISON: Pelvic ultrasound 8/26/2023.    HISTORY: Complex cyst of right ovary; Adnexal mass    TECHNIQUE: Multiplanar, multisequence imaging  was obtained of the  pelvis without and with intravenous contrast. Contrast dose: 9.5 mL  Gadavist.    FINDINGS:  Right ovary: Homogenous T2 hyperintense, T1 hypointense right ovarian  cystic structure measures 5.5 x 4.9 x 4.3 cm (CC, AP, ML) with clear,  thin border and with no diffusion restriction or abnormal contrast  enhancement, favored to represent simple ovarian cyst. Remainder of  the right ovary measures 3.5 x 1.7 cm in axial dimensions,  demonstrating between 15 and 20 subcentimeter follicles. No other  abnormal findings of the right ovary or adnexal structures.    Left ovary: Measures up to 2.9 x 2.3 cm axially. Up to 15-20  subcentimeter follicles present. No signal abnormality present.  Postcontrast sequences are unremarkable No ovarian mass. No abnormal  adnexal structures.    Uterus: Focal asymmetric nodular thickening of the junctional zone in  the posterior uterine body measuring 22 x 19 mm (series 4, image 30)  with internal T1 hyperintense foci (7/87) suggestive of focal  adenomyosis/adenomyoma. Endometrial stripe measures 1.0 cm.  Redemonstrated nabothian cyst measures up to 0.9 cm. No suspicious  cervical abnormality.    Additional findings: Grade 1 retrolisthesis of L5 on S1. L5-S1 disc  bulge. Visible portions of the bowel are unremarkable. Patent vascular  structures demonstrate normal flow void without suspicion for  stenosis. Bladder is grossly unremarkable. Bones and soft tissues are  unremarkable.    Impression  IMPRESSION:  1. 5.5 cm benign-appearing simple right ovarian cyst.  2. Polycystic ovarian morphology, though with normal volumes. Clinical  correlation is required for diagnosis.  3. Focal adenomyosis/adenomyoma in the posterior uterine body.    I have personally reviewed the examination and initial interpretation  and I agree with the findings.    DONALDO TAMAYO MD      SYSTEM ID:  [id number]        ASSESSMENT/PLAN:                                                        ICD-10-CM     1. Cyst of right ovary  N83.201 US Transvaginal Pelvic Non-OB     CANCELED: US Transvaginal Pelvic Non-OB      2. Abnormal uterine bleeding (AUB)  N93.9       3. Atypical squamous cells of undetermined significance (ASCUS) on Papanicolaou smear of cervix  R87.610 Pap imaged thin layer screen only - recommended age 21 - 24 years      4. PCOS (polycystic ovarian syndrome)  E28.2       5. Adenomyosis  N80.03       6. Need for prophylactic vaccination and inoculation against influenza  Z23           Colette Marcos is a 23 year old  with AUB being managed with Nuvaring, incidental finding of right ovarian cyst. Questions about other findings on imaging.     Reviewed US and MRI findings in extensive detail. Images were personally reviewed. Simple appearing right ovarian cyst. No concerning pelvic symptoms. Recommend follow-up in 6 months. Discussed risks of rupture/torsion. If experiencing severe pelvic pain, recommend eval in ER. Discussed vast majority of simple cysts resolve spontaneously. Nuvaring can aid in reducing cyst formation.     MRI showing polycystic appearance to ovaries. Hx of oligomenorrhea. Free testosterone slightly elevated. Reviewed physiology of PCOS including association with metabolic syndrome,  and risk of endometrial hyperplasia. Consistent form of contraception helps regulate menses and prevent overgrowth of endometrial lining and heavy menstrual cycles. Encouraged follow-up with PCP for associated medical issues. She is not working on weight loss at this time.     Adenomyosis- discussed incidental finding of small area of adenomyosis on MRI. Discussed association with endometriosis. She does not have typical symptoms of endometriosis. With incidental finding, no additional follow-up needed.     Pap attempted today and unsuccessful due to patient discomfort. Return for pap. Follow-up for US in 5-6 months.     Dana Lopez MD, MHS  [facility]  10/16/23

## 2023-10-20 DIAGNOSIS — M08.3 JIA (JUVENILE IDIOPATHIC ARTHRITIS), POLYARTHRITIS, RHEUMAT FACTOR NEG (H): ICD-10-CM

## 2023-11-15 ENCOUNTER — MYC MEDICAL ADVICE (OUTPATIENT)
Dept: FAMILY MEDICINE | Facility: CLINIC | Age: 23
End: 2023-11-15
Payer: COMMERCIAL

## 2023-11-15 DIAGNOSIS — F43.21 ADJUSTMENT DISORDER WITH DEPRESSED MOOD: ICD-10-CM

## 2023-11-15 DIAGNOSIS — N92.1 MENORRHAGIA WITH IRREGULAR CYCLE: ICD-10-CM

## 2023-11-15 DIAGNOSIS — R73.03 PREDIABETES: ICD-10-CM

## 2023-11-15 RX ORDER — METFORMIN HCL 500 MG
500 TABLET, EXTENDED RELEASE 24 HR ORAL
Qty: 90 TABLET | Refills: 0 | Status: SHIPPED | OUTPATIENT
Start: 2023-11-15 | End: 2024-01-18

## 2023-11-15 RX ORDER — SERTRALINE HYDROCHLORIDE 100 MG/1
100 TABLET, FILM COATED ORAL DAILY
Qty: 90 TABLET | Refills: 3 | OUTPATIENT
Start: 2023-11-15

## 2023-11-15 RX ORDER — ETONOGESTREL AND ETHINYL ESTRADIOL VAGINAL RING .015; .12 MG/D; MG/D
RING VAGINAL
Qty: 1 EACH | Refills: 11 | Status: SHIPPED | OUTPATIENT
Start: 2023-11-15 | End: 2024-09-27

## 2023-11-15 NOTE — TELEPHONE ENCOUNTER
Pharmacy change to [facility]    Liset AKERS RN  [facility]

## 2023-11-16 RX ORDER — SERTRALINE HYDROCHLORIDE 100 MG/1
100 TABLET, FILM COATED ORAL DAILY
Qty: 90 TABLET | Refills: 3 | Status: SHIPPED | OUTPATIENT
Start: 2023-11-16 | End: 2024-01-18

## 2023-11-17 ENCOUNTER — OFFICE VISIT (OUTPATIENT)
Dept: OPHTHALMOLOGY | Facility: CLINIC | Age: 23
End: 2023-11-17
Attending: OPHTHALMOLOGY
Payer: COMMERCIAL

## 2023-11-17 DIAGNOSIS — H20.11 CHRONIC ANTERIOR UVEITIS OF RIGHT EYE: Primary | ICD-10-CM

## 2023-11-17 DIAGNOSIS — H40.053 BORDERLINE GLAUCOMA OF BOTH EYES WITH OCULAR HYPERTENSION: ICD-10-CM

## 2023-11-17 PROCEDURE — 99213 OFFICE O/P EST LOW 20 MIN: CPT | Performed by: OPHTHALMOLOGY

## 2023-11-17 PROCEDURE — 99214 OFFICE O/P EST MOD 30 MIN: CPT | Performed by: OPHTHALMOLOGY

## 2023-11-17 ASSESSMENT — VISUAL ACUITY
CORRECTION_TYPE: GLASSES
OD_CC+: -2
OD_CC: 20/20
METHOD: SNELLEN - LINEAR
OS_CC: 20/20

## 2023-11-17 ASSESSMENT — CONF VISUAL FIELD
OD_SUPERIOR_TEMPORAL_RESTRICTION: 0
OD_NORMAL: 1
OS_SUPERIOR_TEMPORAL_RESTRICTION: 0
OS_NORMAL: 1
OS_SUPERIOR_NASAL_RESTRICTION: 0
METHOD: COUNTING FINGERS
OS_INFERIOR_TEMPORAL_RESTRICTION: 0
OS_INFERIOR_NASAL_RESTRICTION: 0
OD_SUPERIOR_NASAL_RESTRICTION: 0
OD_INFERIOR_NASAL_RESTRICTION: 0
OD_INFERIOR_TEMPORAL_RESTRICTION: 0

## 2023-11-17 ASSESSMENT — REFRACTION_WEARINGRX
OS_CYLINDER: +0.50
OD_ADD: +2.50
OS_SPHERE: -1.50
OD_CYLINDER: +1.00
OD_SPHERE: -1.50
OS_AXIS: 180
OS_ADD: - -
OD_AXIS: 070

## 2023-11-17 ASSESSMENT — SLIT LAMP EXAM - LIDS
COMMENTS: NORMAL
COMMENTS: NORMAL

## 2023-11-17 ASSESSMENT — CUP TO DISC RATIO
OS_RATIO: 0.5
OD_RATIO: 0.5

## 2023-11-17 ASSESSMENT — EXTERNAL EXAM - LEFT EYE: OS_EXAM: NORMAL

## 2023-11-17 ASSESSMENT — EXTERNAL EXAM - RIGHT EYE: OD_EXAM: NORMAL

## 2023-11-17 ASSESSMENT — TONOMETRY
OD_IOP_MMHG: 14
OS_IOP_MMHG: 16
IOP_METHOD: TONOPEN

## 2023-11-17 NOTE — NURSING NOTE
Chief Complaints and History of Present Illnesses   Patient presents with    Uveitis Follow-Up     4 month follow up Chronic anterior uveitis of right eye     Chief Complaint(s) and History of Present Illness(es)       Uveitis Follow-Up              Laterality: right eye    Associated symptoms: Negative for eye pain, redness, flashes and floaters    Treatments tried: no treatments    Pain scale: 0/10    Comments: 4 month follow up Chronic anterior uveitis of right eye              Comments    No vision changes since last visit.   Denies eye pain, redness, irritation.  No flashes or floaters.     Ocular meds:  Pred forte BID right eye   Humira q2wk    Blair Ho 9:57 AM November 17, 2023

## 2023-11-17 NOTE — PROGRESS NOTES
HPI: Colette Marcos is a 23 year old female with a history of OMERO-associated anterior uveitis right eye here for 4 month uveitis clinic follow up. No eye changes or concerns since last visit. No new floaters. No flashes.     Current medications: prednisolone acetate two times a day right eye, Humira q2wk (started 6.7.18). IMT prescribed by rheum: Dr. Carolina at [facility].    Prior medications have included various oral NSAIDs for joints and a short course of oral prednisone for joints (patient did not tolerate well, felt sick per mother's report, patient does not recall because it was so long ago). For ease of use she switched to Humira from Remicade 700 mg (~9.5 mg/kg) q4wk (Fall 2013 - 5.7.18), oral methotrexate 10 mg (was on and off methotrexate since age 8-9 years, previously on 15 mg/wk with taper starting early Jan 2019 - stopped July 20, 2019).    Ocular history:   1. 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 Joint disease (RF-negative, polyarticular) first diagnosed at age 5 years (though mother reports that she may have been symptomatic as early as age 2 years with ankle pain and swelling). Follows with Dr. Arnold ([facility] rheum) for joint disease and immunomodulatory therapy. Uveitis first diagnosed at age 8-9 years. Patient and mother report no history of uveitis left eye. Review of electronic records here (through 01/2014) shows no cell left eye.    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   Most recently had 0.5+ anterior chamber cell right eye in August 2017 (low trace, ~1 cell/hpf, was holding MTX at time of exam) and 0.5+ anterior chamber cell right eye in March 2019 (0-2 cells/hpf) and June/Aug 2020 (1 cell/hpf).  2. History of cystoid macular edema right eye, s/p intravitreal corticosteroid right eye in September 2013 (Dr. Fofana).  3. Pseudophakia right eye s/p cataract extraction/IOL 12.09.14 ([facility]), s/p YAG capsulotomy  04.24.17.    Ocular Imaging:  Optos fluorescein angiography 6.25.21: no leakage in either eye    Macular OCT 3.10.23: within normal limits and stable both eyes    Central corneal thickness 8.14.20: 571/569  RNFL OCT 3.10.23 within normal limits and stable vs 8.14.20 both eyes    OVF 24-2 3.10.23:  OD - reliable. Diffuse non-specific loss. MD -5.7 **Patient reports that target was difficult to see (bifocal correction was not done for this test - patient has had cataract surgery right eye only and requires bifocal add for visual field testing right eye).  OS - reliable. Normal. MD 0.6    OVF 24-2 7.14.23 right eye: reliable. No defects.    Impression/Plan:   1. OMERO-associated anterior uveitis right eye, quiescent today.   - Continue Humira q2wk   - Continue Predforte right eye twice a day    2. Ocular hypertension both eyes. Repeat retinal nerve fiber layer OCT in March 2023 was normal and stable. Visual field testing in 2023 shows no glaucomatous loss.   - Intraocular pressure previously at higher end of normal, symmetric despite Predforte only in right eye; today intraocular pressure in mid-teens   - Monitor    3. Pseudophakia right eye, stable.   - Monitor    4. Posterior vitreous detachment (PVD) right eye, stable.   - Monitor    Follow up in 3-4 months, V/T, sooner with changes    Attending Physician Attestation:  Complete documentation of historical and exam elements from today's encounter can be found in the full encounter summary report (not reduplicated in this progress note).  I personally obtained the chief complaint(s) and history of present illness.  I confirmed and edited as necessary the review of systems, past medical/surgical history, family history, social history, and examination findings as documented by others; and I examined the patient myself.  I personally reviewed the relevant tests, images, and reports as documented above.  I formulated and edited as necessary the assessment and plan and  discussed the findings and management plan with the patient and family.  - Yaima Maynard M.D.

## 2023-12-07 ENCOUNTER — LAB (OUTPATIENT)
Dept: LAB | Facility: CLINIC | Age: 23
End: 2023-12-07
Payer: COMMERCIAL

## 2023-12-07 ENCOUNTER — MYC MEDICAL ADVICE (OUTPATIENT)
Dept: FAMILY MEDICINE | Facility: CLINIC | Age: 23
End: 2023-12-07

## 2023-12-07 ENCOUNTER — DOCUMENTATION ONLY (OUTPATIENT)
Dept: FAMILY MEDICINE | Facility: CLINIC | Age: 23
End: 2023-12-07

## 2023-12-07 DIAGNOSIS — Z13.220 SCREENING FOR LIPID DISORDERS: ICD-10-CM

## 2023-12-07 DIAGNOSIS — Z13.1 SCREENING FOR DIABETES MELLITUS: Primary | ICD-10-CM

## 2023-12-07 DIAGNOSIS — Z79.899 HIGH RISK MEDICATION USE: ICD-10-CM

## 2023-12-07 LAB
ALP SERPL-CCNC: 87 U/L (ref 40–150)
ALT SERPL W P-5'-P-CCNC: 78 U/L (ref 0–50)
AST SERPL W P-5'-P-CCNC: 64 U/L (ref 0–45)
BASOPHILS # BLD AUTO: 0.1 10E3/UL (ref 0–0.2)
BASOPHILS NFR BLD AUTO: 1 %
CREAT SERPL-MCNC: 0.77 MG/DL (ref 0.51–0.95)
CRP SERPL-MCNC: 20.1 MG/L
EGFRCR SERPLBLD CKD-EPI 2021: >90 ML/MIN/1.73M2
EOSINOPHIL # BLD AUTO: 0.3 10E3/UL (ref 0–0.7)
EOSINOPHIL NFR BLD AUTO: 4 %
ERYTHROCYTE [DISTWIDTH] IN BLOOD BY AUTOMATED COUNT: 12.8 % (ref 10–15)
HCT VFR BLD AUTO: 39.2 % (ref 35–47)
HGB BLD-MCNC: 12.6 G/DL (ref 11.7–15.7)
HOLD SPECIMEN: NORMAL
IMM GRANULOCYTES # BLD: 0 10E3/UL
IMM GRANULOCYTES NFR BLD: 0 %
LYMPHOCYTES # BLD AUTO: 3 10E3/UL (ref 0.8–5.3)
LYMPHOCYTES NFR BLD AUTO: 39 %
MCH RBC QN AUTO: 28.6 PG (ref 26.5–33)
MCHC RBC AUTO-ENTMCNC: 32.1 G/DL (ref 31.5–36.5)
MCV RBC AUTO: 89 FL (ref 78–100)
MONOCYTES # BLD AUTO: 0.6 10E3/UL (ref 0–1.3)
MONOCYTES NFR BLD AUTO: 7 %
NEUTROPHILS # BLD AUTO: 3.8 10E3/UL (ref 1.6–8.3)
NEUTROPHILS NFR BLD AUTO: 49 %
PLATELET # BLD AUTO: 399 10E3/UL (ref 150–450)
RBC # BLD AUTO: 4.4 10E6/UL (ref 3.8–5.2)
WBC # BLD AUTO: 7.8 10E3/UL (ref 4–11)

## 2023-12-07 PROCEDURE — 84460 ALANINE AMINO (ALT) (SGPT): CPT

## 2023-12-07 PROCEDURE — 82565 ASSAY OF CREATININE: CPT

## 2023-12-07 PROCEDURE — 36415 COLL VENOUS BLD VENIPUNCTURE: CPT

## 2023-12-07 PROCEDURE — 86140 C-REACTIVE PROTEIN: CPT

## 2023-12-07 PROCEDURE — 85025 COMPLETE CBC W/AUTO DIFF WBC: CPT

## 2023-12-07 PROCEDURE — 84075 ASSAY ALKALINE PHOSPHATASE: CPT

## 2023-12-07 PROCEDURE — 84450 TRANSFERASE (AST) (SGOT): CPT

## 2023-12-16 ENCOUNTER — MYC MEDICAL ADVICE (OUTPATIENT)
Dept: RHEUMATOLOGY | Facility: CLINIC | Age: 23
End: 2023-12-16
Payer: COMMERCIAL

## 2024-01-05 ENCOUNTER — LAB (OUTPATIENT)
Dept: LAB | Facility: CLINIC | Age: 24
End: 2024-01-05
Payer: COMMERCIAL

## 2024-01-05 DIAGNOSIS — Z13.220 SCREENING FOR LIPID DISORDERS: ICD-10-CM

## 2024-01-05 DIAGNOSIS — Z13.1 SCREENING FOR DIABETES MELLITUS: ICD-10-CM

## 2024-01-05 LAB
CHOLEST SERPL-MCNC: 278 MG/DL
FASTING STATUS PATIENT QL REPORTED: YES
HBA1C MFR BLD: 5.6 % (ref 0–5.6)
HDLC SERPL-MCNC: 45 MG/DL
LDLC SERPL CALC-MCNC: 181 MG/DL
NONHDLC SERPL-MCNC: 233 MG/DL
TRIGL SERPL-MCNC: 262 MG/DL

## 2024-01-05 PROCEDURE — 83036 HEMOGLOBIN GLYCOSYLATED A1C: CPT

## 2024-01-05 PROCEDURE — 36415 COLL VENOUS BLD VENIPUNCTURE: CPT

## 2024-01-05 PROCEDURE — 80061 LIPID PANEL: CPT

## 2024-01-15 ASSESSMENT — ENCOUNTER SYMPTOMS
FEVER: 0
HEMATURIA: 0
CHILLS: 0
DIZZINESS: 0
DYSURIA: 0
JOINT SWELLING: 0
HEARTBURN: 0
FREQUENCY: 0
SORE THROAT: 0
ARTHRALGIAS: 0
COUGH: 0
NERVOUS/ANXIOUS: 0
NAUSEA: 1
WEAKNESS: 0
EYE PAIN: 0
BREAST MASS: 0
PARESTHESIAS: 0
SHORTNESS OF BREATH: 0
DIARRHEA: 0
HEMATOCHEZIA: 0
PALPITATIONS: 0
MYALGIAS: 0
HEADACHES: 0
ABDOMINAL PAIN: 0
CONSTIPATION: 0

## 2024-01-18 ENCOUNTER — OFFICE VISIT (OUTPATIENT)
Dept: FAMILY MEDICINE | Facility: CLINIC | Age: 24
End: 2024-01-18
Payer: COMMERCIAL

## 2024-01-18 VITALS
OXYGEN SATURATION: 98 % | HEIGHT: 64 IN | TEMPERATURE: 97.8 F | SYSTOLIC BLOOD PRESSURE: 132 MMHG | DIASTOLIC BLOOD PRESSURE: 84 MMHG | BODY MASS INDEX: 35.67 KG/M2 | RESPIRATION RATE: 16 BRPM | HEART RATE: 72 BPM

## 2024-01-18 DIAGNOSIS — Z11.3 SCREENING FOR STDS (SEXUALLY TRANSMITTED DISEASES): ICD-10-CM

## 2024-01-18 DIAGNOSIS — E28.2 PCOS (POLYCYSTIC OVARIAN SYNDROME): ICD-10-CM

## 2024-01-18 DIAGNOSIS — R73.03 PREDIABETES: ICD-10-CM

## 2024-01-18 DIAGNOSIS — R41.840 INATTENTION: ICD-10-CM

## 2024-01-18 DIAGNOSIS — F43.21 ADJUSTMENT DISORDER WITH DEPRESSED MOOD: ICD-10-CM

## 2024-01-18 DIAGNOSIS — E78.5 HYPERLIPIDEMIA LDL GOAL <160: ICD-10-CM

## 2024-01-18 DIAGNOSIS — M08.00 JUVENILE RHEUMATOID ARTHRITIS (H): ICD-10-CM

## 2024-01-18 DIAGNOSIS — R03.0 ELEVATED BP WITHOUT DIAGNOSIS OF HYPERTENSION: ICD-10-CM

## 2024-01-18 DIAGNOSIS — Z00.00 ROUTINE GENERAL MEDICAL EXAMINATION AT A HEALTH CARE FACILITY: Primary | ICD-10-CM

## 2024-01-18 DIAGNOSIS — Z12.4 CERVICAL CANCER SCREENING: ICD-10-CM

## 2024-01-18 PROCEDURE — 90471 IMMUNIZATION ADMIN: CPT | Performed by: FAMILY MEDICINE

## 2024-01-18 PROCEDURE — 99395 PREV VISIT EST AGE 18-39: CPT | Mod: 25 | Performed by: FAMILY MEDICINE

## 2024-01-18 PROCEDURE — 90651 9VHPV VACCINE 2/3 DOSE IM: CPT | Performed by: FAMILY MEDICINE

## 2024-01-18 PROCEDURE — 99214 OFFICE O/P EST MOD 30 MIN: CPT | Mod: 25 | Performed by: FAMILY MEDICINE

## 2024-01-18 PROCEDURE — 96127 BRIEF EMOTIONAL/BEHAV ASSMT: CPT | Performed by: FAMILY MEDICINE

## 2024-01-18 RX ORDER — SERTRALINE HYDROCHLORIDE 100 MG/1
100 TABLET, FILM COATED ORAL DAILY
Qty: 90 TABLET | Refills: 3 | Status: SHIPPED | OUTPATIENT
Start: 2024-01-18

## 2024-01-18 RX ORDER — METFORMIN HCL 500 MG
500 TABLET, EXTENDED RELEASE 24 HR ORAL
Qty: 90 TABLET | Refills: 3 | Status: SHIPPED | OUTPATIENT
Start: 2024-01-18

## 2024-01-18 ASSESSMENT — ANXIETY QUESTIONNAIRES
7. FEELING AFRAID AS IF SOMETHING AWFUL MIGHT HAPPEN: NOT AT ALL
2. NOT BEING ABLE TO STOP OR CONTROL WORRYING: SEVERAL DAYS
GAD7 TOTAL SCORE: 7
4. TROUBLE RELAXING: NOT AT ALL
8. IF YOU CHECKED OFF ANY PROBLEMS, HOW DIFFICULT HAVE THESE MADE IT FOR YOU TO DO YOUR WORK, TAKE CARE OF THINGS AT HOME, OR GET ALONG WITH OTHER PEOPLE?: SOMEWHAT DIFFICULT
1. FEELING NERVOUS, ANXIOUS, OR ON EDGE: SEVERAL DAYS
GAD7 TOTAL SCORE: 7
3. WORRYING TOO MUCH ABOUT DIFFERENT THINGS: MORE THAN HALF THE DAYS
GAD7 TOTAL SCORE: 7
IF YOU CHECKED OFF ANY PROBLEMS ON THIS QUESTIONNAIRE, HOW DIFFICULT HAVE THESE PROBLEMS MADE IT FOR YOU TO DO YOUR WORK, TAKE CARE OF THINGS AT HOME, OR GET ALONG WITH OTHER PEOPLE: SOMEWHAT DIFFICULT
6. BECOMING EASILY ANNOYED OR IRRITABLE: MORE THAN HALF THE DAYS
5. BEING SO RESTLESS THAT IT IS HARD TO SIT STILL: SEVERAL DAYS
7. FEELING AFRAID AS IF SOMETHING AWFUL MIGHT HAPPEN: NOT AT ALL

## 2024-01-18 ASSESSMENT — PATIENT HEALTH QUESTIONNAIRE - PHQ9
SUM OF ALL RESPONSES TO PHQ QUESTIONS 1-9: 12
10. IF YOU CHECKED OFF ANY PROBLEMS, HOW DIFFICULT HAVE THESE PROBLEMS MADE IT FOR YOU TO DO YOUR WORK, TAKE CARE OF THINGS AT HOME, OR GET ALONG WITH OTHER PEOPLE: VERY DIFFICULT
SUM OF ALL RESPONSES TO PHQ QUESTIONS 1-9: 12

## 2024-01-18 ASSESSMENT — ENCOUNTER SYMPTOMS
DYSURIA: 0
CHILLS: 0
SORE THROAT: 0
MYALGIAS: 0
PALPITATIONS: 0
DIZZINESS: 0
CONSTIPATION: 0
NAUSEA: 1
HEADACHES: 0
ARTHRALGIAS: 0
NERVOUS/ANXIOUS: 0
EYE PAIN: 0
ABDOMINAL PAIN: 0
SHORTNESS OF BREATH: 0
JOINT SWELLING: 0
HEMATURIA: 0
FREQUENCY: 0
DIARRHEA: 0
COUGH: 0
FEVER: 0
WEAKNESS: 0

## 2024-01-18 NOTE — PROGRESS NOTES
Preventive Care Visit  [facility]  Alisia Krishnamurthy MD, Family Medicine  Jan 18, 2024       SUBJECTIVE:   Colette is a 23 year old, presenting for the following:  Physical      Healthy Habits:     Getting at least 3 servings of Calcium per day:  Yes    Bi-annual eye exam:  Yes    Dental care twice a year:  Yes    Sleep apnea or symptoms of sleep apnea:  Daytime drowsiness and Excessive snoring    Diet:  Regular (no restrictions)    Frequency of exercise:  2-3 days/week    Duration of exercise:  15-30 minutes    Taking medications regularly:  Yes    Medication side effects:  None    Additional concerns today:  Yes  1.evaluation for attention deficit hyperactivity disorder  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 PCOS  Ultrasound - not discussed   Skipping the period on nuva ring. Does not need refills   Seen by OBG/GYN- has a follow up ultrasound matt for complex ovarain cyst.      3. Has a dietician, almost works as therapist  Feels that the symptoms depression and anxiety- are possibly the result of undiagnosed attention deficit hyperactivity disorder and  would like to continue with same dose of sertraline 100 mg once daily & needs refills    4. Metformin was started due to pre-Diabetes . Repeat A1c looks excellent    5. Sertraline 100 mg once daily and she denies suicidal thoughts or ideation.reports no side effects from medications. Would like to continue.    Today's PHQ-9 Score:       1/18/2024     6:49 AM   PHQ-9 SCORE   PHQ-9 Total Score MyChart 12 (Moderate depression)   PHQ-9 Total Score 12             Social History     Tobacco Use    Smoking status: Never    Smokeless tobacco: Never   Substance Use Topics    Alcohol use: No             1/15/2024    10:07 AM   Alcohol Use   Prescreen: >3  "drinks/day or >7 drinks/week? Not Applicable          No data to display              Reviewed orders with patient.  Reviewed health maintenance and updated orders accordingly - Yes      Breast Cancer Screening:    History of abnormal Pap smear: NO - age 21-29 PAP every 3 years recommended      7/28/2022     3:19 PM 7/19/2021    10:16 AM   PAP / HPV   PAP Negative for Intraepithelial Lesion or Malignancy (NILM)  Atypical squamous cells of undetermined significance (ASC-US)      Reviewed and updated as needed this visit by clinical staff   Tobacco  Allergies  Meds  Problems  Med Hx  Surg Hx  Fam Hx          Reviewed and updated as needed this visit by Provider   Tobacco  Allergies  Meds  Problems  Med Hx  Surg Hx  Fam Hx              OBJECTIVE:   /84   Pulse 72   Temp 97.8  F (36.6  C) (Temporal)   Resp 16   Ht 1.626 m (5' 4\")   LMP 12/30/2023   SpO2 98%   BMI 35.67 kg/m     Estimated body mass index is 35.67 kg/m  as calculated from the following:    Height as of this encounter: 1.626 m (5' 4\").    Weight as of 7/28/22: 94.3 kg (207 lb 12.8 oz).  Review of Systems   Constitutional:  Negative for chills and fever.   HENT:  Negative for congestion, ear pain, hearing loss and sore throat.    Eyes:  Negative for pain and visual disturbance.   Respiratory:  Negative for cough and shortness of breath.    Cardiovascular:  Negative for chest pain and palpitations.   Gastrointestinal:  Positive for nausea. Negative for abdominal pain, constipation and diarrhea.   Genitourinary:  Negative for dysuria, frequency, genital sores, hematuria, pelvic pain, urgency, vaginal bleeding and vaginal discharge.   Musculoskeletal:  Negative for arthralgias, joint swelling and myalgias.   Skin:  Negative for rash.   Neurological:  Negative for dizziness, weakness and headaches.   Psychiatric/Behavioral:  The patient is not nervous/anxious.        Physical Exam  GENERAL: alert and no distress  EYES: Eyes grossly " normal to inspection, PERRL and conjunctivae and sclerae normal  HENT: ear canals and TM's normal, nose and mouth without ulcers or lesions  NECK: no adenopathy, no asymmetry, masses, or scars  RESP: lungs clear to auscultation - no rales, rhonchi or wheezes  CV: regular rate and rhythm, normal S1 S2, no S3 or S4, no murmur, click or rub, no peripheral edema  ABDOMEN: soft, nontender, no hepatosplenomegaly, no masses and bowel sounds normal  MS: no gross musculoskeletal defects noted, no edema  SKIN: no suspicious lesions or rashes  NEURO: Normal strength and tone, mentation intact and speech normal  PSYCH: mentation appears normal, affect normal/bright  LYMPH: no cervical, supraclavicular, axillary, or inguinal adenopathy    Diagnostic Test Results:  Labs reviewed in Epic  none     ASSESSMENT/PLAN:   (Z00.00) Routine general medical examination at a health care facility  (primary encounter diagnosis)  Comment: pap and breast exam declined by patient today  Willing to complete them at a future date    (R41.840) Inattention  Comment: ? Possibility of undxd attention deficit hyperactivity disorder. Needs eval and would like to be treated with stimulants if positive   Plan: Adult Mental Health  Referral    (E28.2) PCOS (polycystic ovarian syndrome)  Comment:ultrasound discussed in detail  Plan: management with wt reduction, excercise ,metformin, and hormonal birth control  She has seen OBG/GYN and has a follow up us for complex ovarian cyst. Advised to reviw it further after ultrasound is completed as advised      (R73.03) Prediabetes  Comment: refill given for metformin  Plan: metFORMIN (GLUCOPHAGE XR) 500 MG 24 hr tablet    (F43.21) Adjustment disorder with depressed mood  Comment: refill given  Plan: sertraline (ZOLOFT) 100 MG tablet, once daily      8/22/2023     4:12 PM 11/27/2023    10:02 AM 1/18/2024     6:49 AM   PHQ   PHQ-9 Total Score 6 8 12   Q9: Thoughts of better off dead/self-harm past 2 weeks  Not at all Not at all Not at all         12/13/2021     7:15 AM 8/22/2023     4:13 PM 1/18/2024     6:50 AM   SAMEER-7 SCORE   Total Score 7 (mild anxiety) 8 (mild anxiety) 7 (mild anxiety)   Total Score 7 8 7       EMOTIONAL /       BEHAVIORAL ASSESSMENT    (R03.0) Elevated BP without diagnosis of hypertension  Comment:   Plan: low salt diet, excercise and recheck periodically  Please see patient instructions       (E78.5) Hyperlipidemia LDL goal <160  Comment: discussed labs  Plan: Please see patient instructions   Recheck annually    (M08.00) Juvenile rheumatoid arthritis (H)  Comment: - considered this problem when making today's plans  - no interventions today       -followed by specialist.    (Z11.3) Screening for STDs (sexually transmitted diseases)  Comment: patient declined    (Z12.4) Cervical cancer screening  Comment: pt declined  Plan: CANCELED: Pap Screen only - recommended age 21         - 24 years      Patient has been advised of split billing requirements and indicates understanding: Yes    Depression Screening Follow Up        1/18/2024     6:49 AM   PHQ   PHQ-9 Total Score 12   Q9: Thoughts of better off dead/self-harm past 2 weeks Not at all                Counseling  Reviewed preventive health counseling, as reflected in patient instructions       Regular exercise       Healthy diet/nutrition       Vision screening       Contraception       Family planning       Folic Acid       Safe sex practices/STD prevention       Advance Care Planning        She reports that she has never smoked. She has never used smokeless tobacco.          Signed Electronically by: Alisia Krishnamurthy MD  Answers submitted by the patient for this visit:  Patient Health Questionnaire (Submitted on 1/18/2024)  If you checked off any problems, how difficult have these problems made it for you to do your work, take care of things at home, or get along with other people?: Very difficult  PHQ9 TOTAL SCORE: 12  SAMEER-7 (Submitted on  1/18/2024)  SAMEER 7 TOTAL SCORE: 7  Annual Preventive Visit (Submitted on 1/15/2024)  Chief Complaint: Annual Exam:  Blood in stool: No  heartburn: No  peripheral edema: No  mood changes: No  Skin sensation changes: No  tenderness: No  breast mass: No  breast discharge: No

## 2024-01-18 NOTE — PATIENT INSTRUCTIONS
-Normal  BP is  119/79 or lower. Upper number is systolic Blood pressure (SBP). Lower number is diastolic  Blood pressure (DBP)  -Blood pressure between 120-139/80-89 (prehypertensive blood pressure/ class 1 hypertension )   -Hypertensive is  BP of 140/90 or above and needs treatment with medication.  -life style changes that are beneficial to reach goal of normal Blood pressure   1.Weight loss of 1 kg can reduce systolic Blood pressure  (SBP) by 1 mmHg  2.Health healthy diet (eg DASH dietary pattern can reduce SBP by 11 mmHg)  3.Structured excercise program (150 mins aerobic activity/week can reduce SBP by 5 mmHg)  4.Low salt  diet - very important.(eg: cut by 255 or 1000 mg/day to reduce SBP by 5mmHg)  5. Increase 4-5 serving of fresh vegetables & fruits /day- good source of potassium.          if More than 140/90  after a month of above life style changes  Return visit with MD/provider  for recheck & consideration of medications .       -LDL(bad) cholesterol level is elevated, and your triglycerides are elevated which can increase your heart disease risk.  A diet high in fat and simple carbohydrates, genetics and being overweight can contribute to this. ADVISE: exercising 150 minutes of aerobic exercise per week (30 minutes for 5 days per week or 50 minutes for 3 days per week are options), eating a low saturated fat/low carbohydrate diet, and omega-3 fatty acids (fish oil) 8050-5980 mg daily are helpful to improve this. In 12 months, you should recheck your fasting cholesterol panel by scheduling a lab-only appointment.      Preventive Health Recommendations  Female Ages 21 to 25     Yearly exam:   See your health care provider every year in order to  Review health changes.   Discuss preventive care.    Review your medicines if your doctor has prescribed any.    You should be tested each year for STDs (sexually transmitted diseases).     Talk to your provider about how often you should have cholesterol  testing.    Get a Pap test every three years. If you have an abnormal result, your doctor may have you test more often.    If you are at risk for diabetes, you should have a diabetes test (fasting glucose).     Shots:   Get a flu shot each year.   Get a tetanus shot every 10 years.   Consider getting the shot (vaccine) that prevents cervical cancer (Gardasil).    Nutrition:   Eat at least 5 servings of fruits and vegetables each day.  Eat whole-grain bread, whole-wheat pasta and brown rice instead of white grains and rice.  Get adequate Calcium and Vitamin D.     Lifestyle  Exercise at least 150 minutes a week each week (30 minutes a day, 5 days a week). This will help you control your weight and prevent disease.  Limit alcohol to one drink per day.  No smoking.   Wear sunscreen to prevent skin cancer.  See your dentist every six months for an exam and cleaning.

## 2024-03-15 NOTE — PROGRESS NOTES
"[facility]  OB/GYN Clinic Note    SUBJECTIVE:                                                   Colette Marcos is a 23 year old  female who presents to clinic today for the following health issue(s):  Patient presents with:  Follow Up: US - ovarian cyst    HPI:  Seen today for follow-up of ovarian cyst. Denies pain. Wondering about PCOS dx. Has occasional prolonged bleeding on continuous Nuvaring use.   Declined weight today.     Ovarian cyst hx:   23: US for AUB, mildly complex 5.5cm right adnexal cyst with peripheral soft tissue nodularity  23: MRI pelvis: 5.5cm benign appearing simple right ovarian cyst     No LMP recorded. (Menstrual status: Birth Control)..   Patient is sexually active, .  Using ring for contraception.    reports that she has never smoked. She has never used smokeless tobacco.  STD testing offered?  Declined  Health maintenance updated:  yes      OBJECTIVE:     /78   Ht 1.626 m (5' 4\")   BMI 35.67 kg/m    Body mass index is 35.67 kg/m .    Exam:  Constitutional:  Appearance: Well nourished, well developed alert, in no acute distress     In-Clinic Test Results:  Results for orders placed or performed in visit on 24 (from the past 24 hour(s))   US Transvaginal Pelvic Non-OB    Narrative    Table formatting from the original result was not included.     Gynecological Ultrasound Report  Pelvic U/S - Transvaginal  [facility]  Referring Provider: Dana Lopez MD   Sonographer:  Brittnee Spurling, RDMS  Indication: Right ovarian cyst  LMP: No LMP recorded.  History:   Gynecological Ultrasonography:   Uterus: anteverted. Contour is smooth/regular.  Size: 7.2 x 4.2 x 3.5 cm  Endometrium: Thickness Total 9.9 mm  Findings: right adnexal simple cyst; ovarian vs para-ovarian  Right Ovary: 3.9 x 1.6 x 1.8 cm. Simple cyst 4.0 x 4.1 x 3.9 cm  Left Ovary: 3.3 x 2.2 x 2.8 cm. Wnl  Cul de Sac Free Fluid: No free fluid  Technique: " Transvaginal Imaging performed     Impression:   The uterus was visualized and no abnormalities were seen.  The endometrium appeared normal.  A cyst was noted adjacent to the right ovary measuring 4.1cm and is simple   appearing. Normal left ovary.     Dana Lopez MD, S  24         ASSESSMENT/PLAN:                                                        ICD-10-CM    1. Cyst of right ovary  N83.201 US Pelvic Complete with Transvaginal      2. Pap smear for cervical cancer screening  Z12.4       3. PCOS (polycystic ovarian syndrome)  E28.2       4. Breakthrough bleeding with NuvaRing  N92.1     Z97.5         Colette Marcos is a 23 year old  here for above. Reviewed US findings of slight decrease in size of simple right ovarian cyst. Reviewed risks of torsion. She is asymptomatic. Will continue with expectant management and follow-up again in 6 months for repeat imaging. If stable/decreasing at that time, can continue with annual US.   Due for pap- very painful. Discussed Ativan prior to visit for anxiety/pain- pt will send message for rx prior to visit.   Follow-up for repeat US and pap in 6 months.  Discussed diagnosis of PCOS. Even thought US does not show PCOS appearance, clinically picture is that of PCOS with hx of elevated testosterone and heavy/irregular menses. Discussed that combined hormonal contraception can suppress formation of cysts, thus the change in appearance on US.   Discussed using Nuvaring as prescribed to cycle every 3-4 months to improve BTB (remove at day 21, and replace at day 28 of cycle) otherwise can use continuously as she has been doing.     Dana Lopez MD, S  [facility] [place]  23

## 2024-03-22 ENCOUNTER — OFFICE VISIT (OUTPATIENT)
Dept: OPHTHALMOLOGY | Facility: CLINIC | Age: 24
End: 2024-03-22
Attending: OPHTHALMOLOGY
Payer: COMMERCIAL

## 2024-03-22 DIAGNOSIS — H40.053 BORDERLINE GLAUCOMA OF BOTH EYES WITH OCULAR HYPERTENSION: ICD-10-CM

## 2024-03-22 DIAGNOSIS — H20.9 UVEITIS: ICD-10-CM

## 2024-03-22 DIAGNOSIS — H20.11 CHRONIC ANTERIOR UVEITIS OF RIGHT EYE: Primary | ICD-10-CM

## 2024-03-22 PROCEDURE — 99214 OFFICE O/P EST MOD 30 MIN: CPT | Performed by: OPHTHALMOLOGY

## 2024-03-22 PROCEDURE — 99213 OFFICE O/P EST LOW 20 MIN: CPT | Performed by: OPHTHALMOLOGY

## 2024-03-22 RX ORDER — PREDNISOLONE ACETATE 10 MG/ML
1 SUSPENSION/ DROPS OPHTHALMIC 3 TIMES DAILY
Qty: 5 ML | Refills: 2 | Status: SHIPPED | OUTPATIENT
Start: 2024-03-22 | End: 2024-08-23

## 2024-03-22 ASSESSMENT — CUP TO DISC RATIO
OS_RATIO: 0.5
OD_RATIO: 0.5

## 2024-03-22 ASSESSMENT — TONOMETRY
OS_IOP_MMHG: 19
OD_IOP_MMHG: 18
IOP_METHOD: TONOPEN

## 2024-03-22 ASSESSMENT — VISUAL ACUITY
OD_CC: 20/20
METHOD: SNELLEN - LINEAR
OS_CC: 20/20
CORRECTION_TYPE: GLASSES
OD_CC+: -1

## 2024-03-22 ASSESSMENT — REFRACTION_WEARINGRX
OD_AXIS: 070
OD_SPHERE: -1.50
OS_ADD: - -
OS_AXIS: 180
OS_SPHERE: -1.50
OD_CYLINDER: +1.00
OD_ADD: +2.50
OS_CYLINDER: +0.50

## 2024-03-22 ASSESSMENT — SLIT LAMP EXAM - LIDS
COMMENTS: NORMAL
COMMENTS: NORMAL

## 2024-03-22 ASSESSMENT — EXTERNAL EXAM - LEFT EYE: OS_EXAM: NORMAL

## 2024-03-22 ASSESSMENT — EXTERNAL EXAM - RIGHT EYE: OD_EXAM: NORMAL

## 2024-03-22 NOTE — NURSING NOTE
Chief Complaints and History of Present Illnesses   Patient presents with    Uveitis Follow-Up     4 month follow up      Chief Complaint(s) and History of Present Illness(es)       Uveitis Follow-Up              Associated symptoms: floaters.  Negative for eye pain, redness and flashes    Treatments tried: eye drops    Pain scale: 0/10    Comments: 4 month follow up               Comments    Pt states no VA changes since last visit.   No eye pain, redness or irritation.   No flashes, stable floaters right eye   No new concerns.     Ocular meds:   Pred forte BID right eye   Humira q2wk     Blair Ho 9:52 AM March 22, 2024

## 2024-03-22 NOTE — PROGRESS NOTES
HPI: Colette Marcos is a 23 year old female with a history of OMERO-associated anterior uveitis right eye here for 4 month uveitis clinic follow up. No eye changes or concerns since last visit. No new floaters. No flashes.     Current medications: prednisolone acetate two times a day right eye, Humira q2wk (started 6.7.18). IMT prescribed by rheum at [facility].    Prior medications have included various oral NSAIDs for joints and a short course of oral prednisone for joints (patient did not tolerate well, felt sick per mother's report, patient does not recall because it was so long ago). For ease of use she switched to Humira from Remicade 700 mg (~9.5 mg/kg) q4wk (Fall 2013 - 5.7.18), oral methotrexate 10 mg (was on and off methotrexate since age 8-9 years, previously on 15 mg/wk with taper starting early Jan 2019 - stopped July 20, 2019).    Ocular history:   1. 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 Joint disease (RF-negative, polyarticular) first diagnosed at age 5 years (though mother reports that she may have been symptomatic as early as age 2 years with ankle pain and swelling). Follows with Dr. Arnold ([facility] rheum) for joint disease and immunomodulatory therapy. Uveitis first diagnosed at age 8-9 years. Patient and mother report no history of uveitis left eye. Review of electronic records here (through 01/2014) shows no cell left eye.    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   Most recently had 0.5+ anterior chamber cell right eye in August 2017 (low trace, ~1 cell/hpf, was holding MTX at time of exam) and 0.5+ anterior chamber cell right eye in March 2019 (0-2 cells/hpf) and Melissa/Aug 2020 (1 cell/hpf).  2. History of cystoid macular edema right eye, s/p intravitreal corticosteroid right eye in September 2013 (Dr. Fofana).  3. Pseudophakia right eye s/p cataract extraction/IOL 12.09.14 (Bothun), s/p YAG capsulotomy 04.24.17.    Ocular  Imaging:  Optos fluorescein angiography 6.25.21: no leakage in either eye    Macular OCT 3.10.23: within normal limits and stable both eyes    Central corneal thickness 8.14.20: 571/569  RNFL OCT 3.10.23 within normal limits and stable vs 8.14.20 both eyes    OVF 24-2 3.10.23:  OD - reliable. Diffuse non-specific loss. MD -5.7 **Patient reports that target was difficult to see (bifocal correction was not done for this test - patient has had cataract surgery right eye only and requires bifocal add for visual field testing right eye).  OS - reliable. Normal. MD 0.6    OVF 24-2 7.14.23 right eye: reliable. No defects.    Impression/Plan:   1. OMERO-associated anterior uveitis right eye, quiescent today.   - Continue Humira q2wk   - Continue Predforte right eye twice a day    2. Ocular hypertension both eyes. Repeat retinal nerve fiber layer OCT in March 2023 was normal and stable. Visual field testing in 2023 shows no glaucomatous loss.   - Intraocular pressure previously at higher end of normal, symmetric despite Predforte only in right eye; today intraocular pressure in mid-teens   - Monitor    3. Pseudophakia right eye, stable.   - Monitor    4. Posterior vitreous detachment (PVD) right eye, stable.   - Monitor    Follow up in 4-6 months, V/T, sooner with changes    Attending Physician Attestation:  Complete documentation of historical and exam elements from today's encounter can be found in the full encounter summary report (not reduplicated in this progress note).  I personally obtained the chief complaint(s) and history of present illness.  I confirmed and edited as necessary the review of systems, past medical/surgical history, family history, social history, and examination findings as documented by others; and I examined the patient myself.  I personally reviewed the relevant tests, images, and reports as documented above.  I formulated and edited as necessary the assessment and plan and discussed the findings  and management plan with the patient and family.  - Yaima Maynard M.D.

## 2024-03-25 ENCOUNTER — ANCILLARY PROCEDURE (OUTPATIENT)
Dept: ULTRASOUND IMAGING | Facility: CLINIC | Age: 24
End: 2024-03-25
Attending: STUDENT IN AN ORGANIZED HEALTH CARE EDUCATION/TRAINING PROGRAM
Payer: COMMERCIAL

## 2024-03-25 ENCOUNTER — OFFICE VISIT (OUTPATIENT)
Dept: OBGYN | Facility: CLINIC | Age: 24
End: 2024-03-25
Attending: STUDENT IN AN ORGANIZED HEALTH CARE EDUCATION/TRAINING PROGRAM
Payer: COMMERCIAL

## 2024-03-25 VITALS — HEIGHT: 64 IN | SYSTOLIC BLOOD PRESSURE: 128 MMHG | BODY MASS INDEX: 35.67 KG/M2 | DIASTOLIC BLOOD PRESSURE: 78 MMHG

## 2024-03-25 DIAGNOSIS — Z12.4 PAP SMEAR FOR CERVICAL CANCER SCREENING: ICD-10-CM

## 2024-03-25 DIAGNOSIS — E28.2 PCOS (POLYCYSTIC OVARIAN SYNDROME): ICD-10-CM

## 2024-03-25 DIAGNOSIS — N83.201 CYST OF RIGHT OVARY: ICD-10-CM

## 2024-03-25 DIAGNOSIS — Z97.5 BREAKTHROUGH BLEEDING WITH NUVARING: ICD-10-CM

## 2024-03-25 DIAGNOSIS — N83.201 CYST OF RIGHT OVARY: Primary | ICD-10-CM

## 2024-03-25 DIAGNOSIS — N92.1 BREAKTHROUGH BLEEDING WITH NUVARING: ICD-10-CM

## 2024-03-25 PROCEDURE — 76830 TRANSVAGINAL US NON-OB: CPT | Performed by: STUDENT IN AN ORGANIZED HEALTH CARE EDUCATION/TRAINING PROGRAM

## 2024-03-25 PROCEDURE — 99213 OFFICE O/P EST LOW 20 MIN: CPT | Mod: 25 | Performed by: STUDENT IN AN ORGANIZED HEALTH CARE EDUCATION/TRAINING PROGRAM

## 2024-03-29 ENCOUNTER — OFFICE VISIT (OUTPATIENT)
Dept: RHEUMATOLOGY | Facility: CLINIC | Age: 24
End: 2024-03-29
Attending: STUDENT IN AN ORGANIZED HEALTH CARE EDUCATION/TRAINING PROGRAM
Payer: COMMERCIAL

## 2024-03-29 ENCOUNTER — LAB (OUTPATIENT)
Dept: LAB | Facility: CLINIC | Age: 24
End: 2024-03-29
Attending: STUDENT IN AN ORGANIZED HEALTH CARE EDUCATION/TRAINING PROGRAM
Payer: COMMERCIAL

## 2024-03-29 VITALS — HEART RATE: 81 BPM | DIASTOLIC BLOOD PRESSURE: 84 MMHG | SYSTOLIC BLOOD PRESSURE: 130 MMHG | OXYGEN SATURATION: 94 %

## 2024-03-29 DIAGNOSIS — M08.00 JUVENILE RHEUMATOID ARTHRITIS (H): Primary | ICD-10-CM

## 2024-03-29 DIAGNOSIS — M08.00 JUVENILE RHEUMATOID ARTHRITIS (H): ICD-10-CM

## 2024-03-29 LAB
ALBUMIN SERPL BCG-MCNC: 4.5 G/DL (ref 3.5–5.2)
ALP SERPL-CCNC: 72 U/L (ref 40–150)
ALT SERPL W P-5'-P-CCNC: 65 U/L (ref 0–50)
ANION GAP SERPL CALCULATED.3IONS-SCNC: 14 MMOL/L (ref 7–15)
AST SERPL W P-5'-P-CCNC: 54 U/L (ref 0–45)
BASOPHILS # BLD AUTO: 0.1 10E3/UL (ref 0–0.2)
BASOPHILS NFR BLD AUTO: 1 %
BILIRUB SERPL-MCNC: 0.2 MG/DL
BUN SERPL-MCNC: 11.2 MG/DL (ref 6–20)
CALCIUM SERPL-MCNC: 9.9 MG/DL (ref 8.6–10)
CHLORIDE SERPL-SCNC: 98 MMOL/L (ref 98–107)
CREAT SERPL-MCNC: 0.71 MG/DL (ref 0.51–0.95)
CRP SERPL-MCNC: 10.67 MG/L
DEPRECATED HCO3 PLAS-SCNC: 21 MMOL/L (ref 22–29)
EGFRCR SERPLBLD CKD-EPI 2021: >90 ML/MIN/1.73M2
EOSINOPHIL # BLD AUTO: 0.3 10E3/UL (ref 0–0.7)
EOSINOPHIL NFR BLD AUTO: 3 %
ERYTHROCYTE [DISTWIDTH] IN BLOOD BY AUTOMATED COUNT: 13.1 % (ref 10–15)
GLUCOSE SERPL-MCNC: 100 MG/DL (ref 70–99)
HBA1C MFR BLD: 5.7 %
HCT VFR BLD AUTO: 38.9 % (ref 35–47)
HGB BLD-MCNC: 12.9 G/DL (ref 11.7–15.7)
IMM GRANULOCYTES # BLD: 0 10E3/UL
IMM GRANULOCYTES NFR BLD: 0 %
LYMPHOCYTES # BLD AUTO: 2.9 10E3/UL (ref 0.8–5.3)
LYMPHOCYTES NFR BLD AUTO: 37 %
MCH RBC QN AUTO: 28.7 PG (ref 26.5–33)
MCHC RBC AUTO-ENTMCNC: 33.2 G/DL (ref 31.5–36.5)
MCV RBC AUTO: 87 FL (ref 78–100)
MONOCYTES # BLD AUTO: 0.7 10E3/UL (ref 0–1.3)
MONOCYTES NFR BLD AUTO: 9 %
NEUTROPHILS # BLD AUTO: 3.9 10E3/UL (ref 1.6–8.3)
NEUTROPHILS NFR BLD AUTO: 50 %
NRBC # BLD AUTO: 0 10E3/UL
NRBC BLD AUTO-RTO: 0 /100
PLATELET # BLD AUTO: 365 10E3/UL (ref 150–450)
POTASSIUM SERPL-SCNC: 4.2 MMOL/L (ref 3.4–5.3)
PROT SERPL-MCNC: 8.3 G/DL (ref 6.4–8.3)
RBC # BLD AUTO: 4.49 10E6/UL (ref 3.8–5.2)
SODIUM SERPL-SCNC: 133 MMOL/L (ref 135–145)
WBC # BLD AUTO: 7.8 10E3/UL (ref 4–11)

## 2024-03-29 PROCEDURE — 80053 COMPREHEN METABOLIC PANEL: CPT

## 2024-03-29 PROCEDURE — 36415 COLL VENOUS BLD VENIPUNCTURE: CPT

## 2024-03-29 PROCEDURE — 83036 HEMOGLOBIN GLYCOSYLATED A1C: CPT

## 2024-03-29 PROCEDURE — 86140 C-REACTIVE PROTEIN: CPT

## 2024-03-29 PROCEDURE — 99213 OFFICE O/P EST LOW 20 MIN: CPT | Performed by: STUDENT IN AN ORGANIZED HEALTH CARE EDUCATION/TRAINING PROGRAM

## 2024-03-29 PROCEDURE — 99215 OFFICE O/P EST HI 40 MIN: CPT | Performed by: STUDENT IN AN ORGANIZED HEALTH CARE EDUCATION/TRAINING PROGRAM

## 2024-03-29 PROCEDURE — G2211 COMPLEX E/M VISIT ADD ON: HCPCS | Performed by: STUDENT IN AN ORGANIZED HEALTH CARE EDUCATION/TRAINING PROGRAM

## 2024-03-29 PROCEDURE — 85025 COMPLETE CBC W/AUTO DIFF WBC: CPT

## 2024-03-29 ASSESSMENT — PAIN SCALES - GENERAL: PAINLEVEL: NO PAIN (0)

## 2024-03-29 NOTE — NURSING NOTE
Chief Complaint   Patient presents with    Consult     /84 (BP Location: Left arm, Patient Position: Sitting, Cuff Size: Adult Large)   Pulse 81   LMP 03/01/2024   SpO2 94%

## 2024-03-29 NOTE — PROGRESS NOTES
Problem List    SAMEER/MDD/ ADHD/PTSD  PCOS  Fatty liver   Pre-diabetes     Assessment & Plan       History of Polyarticular OMERO   Uveitis   Comment: Diagnosed at age of 5 with involvement ankles and knees and later developed recurrent right uveitis.  Arthritis has been in remission since 2013.  She continues on twice daily steroid drops for her uveitis. I am following her ophthalmologists' (Dr. Maynard's) lead in regards to dosing for eye disease as her arthritis continues to be remission.    Treatment history  infliximab infusion - 5775-5564 transitioned because went to college  Methotrexate - started in elementary school. Weekly injections. weaned off slowly first year of college, would vomit day after methotrexate an/ord often felt nauseas the next day    -Continue adalimumab 40 mg every 14 days.   -Continue ophthalmology follow-up    High risk medication use  Comment: Humira  Quant gold, hepatitis serologies negative 2021  Plan:  -Discussed side effect profile of Humira today with patient including increased risk of infections, and increased risk of cancers.  She is aware that she should hold her medication in the setting of fever or infection, and contact me if she is planning to have surgery.  -Toxicity monitoring labs today and every 6 months  -Recommend shingles and pneumonia vaccine as well as latest COVID booster    Preventative care in patient with autoimmune disease  Comment: Patient with metabolic risk factors including obesity, fatty liver disease, and prediabetes  Plan:   -Discussed with patient that people with autoimmune disease have increased risk of cardiovascular disease.  Recommended working with her primary care doctor to mitigate her cardiovascular risk factors.  -Reports being up-to-date on her Pap smears     Reproductive health  Comment: Patient has 1 female partner.  Currently no plans for conception  Plan:  -Patient knows to contact me if she is planning a pregnancy    Orders Placed This  Encounter   Procedures    Comprehensive metabolic panel    Hemoglobin A1c    CRP inflammation      Follow up in 6 months    42 minutes spent on the day of the encounter reviewing tests, records, ordering, communicating results and counseling the patient, care coordination and documentation.     The longitudinal plan of care for the diagnosis(es)/condition(s) as documented were addressed during this visit. Due to the added complexity in care, I will continue to support Colette in the subsequent management and with ongoing continuity of care.      Elizabeth Gaxiola MD  Rheumatology     Subjective       Interim Hx:     Previous pt of Dr. Carolina, transferring care to me today.  Reports no joint pain, swelling, or stiffness while on Humira.  Continues to use steroid eyedrops twice a day and is being followed by ophthalmology closely.  Is hopeful she can wean her steroids when she sees them next this coming September.      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 Diagnosed with uveitis of her right eye around age of 7. Has been on and off steroid eye strops since. Only involvement of her right eye.  Her uveitis worsened around the age of 14 and she required an intraocular steroid injection which unfortunately resulted in the cataract.  She had cataract surgery 2014.  She started Remicade for her arthritis in the beginning of high school in 2013 and remained on this until her first year of college at which point she was switched to Humira for convenience.  She has had good disease control for her arthritis since the start of Remicade and throughout her Humira.  No infectious complications.        I have reviewed recent labs and images  including:  Lab Review  Negative WILLIAN, rheumatoid factor, CCP and HLA-B27      Past Medical History:   Diagnosis Date    Abnormal Pap smear of cervix 07/19/2021    Depression with anxiety     OMERO (juvenile idiopathic arthritis), oligoarthritis, persistent (H) 06/07/2012    Nonsenile cataract     Obesity     Prediabetes     Uveitis      No Known Allergies  Social History     Tobacco Use    Smoking status: Never    Smokeless tobacco: Never   Vaping Use    Vaping Use: Never used   Substance Use Topics    Alcohol use: No    Drug use: No        Current Outpatient Medications:     adalimumab (HUMIRA *CF*) 40 MG/0.4ML pen kit, Inject 0.4 mLs (40 mg) Subcutaneous every 14 days Hold for signs of infection, then seek medical attention., Disp: 2 each, Rfl: 5    etonogestrel-ethinyl estradiol (NUVARING) 0.12-0.015 MG/24HR vaginal ring, Insert one (1) ring vaginally and leave in place for 3 consecutive weeks (21 days), then remove for 1 week., Disp: 1 each, Rfl: 11    metFORMIN (GLUCOPHAGE XR) 500 MG 24 hr tablet, Take 1 tablet (500 mg) by mouth daily (with dinner), Disp: 90 tablet, Rfl: 3    prednisoLONE acetate (PRED FORTE) 1 % ophthalmic suspension, Place 1 drop into the right eye 3 times daily, Disp: 5 mL, Rfl: 2    sertraline (ZOLOFT) 100 MG tablet, Take 1 tablet (100 mg) by mouth daily, Disp: 90 tablet, Rfl: 3     Objective   /84 (BP Location: Left arm, Patient Position: Sitting, Cuff Size: Adult Large)   Pulse 81   LMP 03/01/2024   SpO2 94%   General: alert, well appearing,   HEENT: clear conjunctiva,  Cardiac: normal rate and rhythm, no murmur, rubs or gallops   Pulm: normal respiratory effort, clear to auscultation bilaterally    MSK: normal gait,   Hand, wrist, elbow, and shoulder joints without evidence of synovitis or effusion. Intact and nonpainful range of motion.  knee/hip joints without erythema/effusion/tenderness to palpation and with intact nonpainful range of motion.   Skin: No rashes, warm and  well-perfused.

## 2024-03-29 NOTE — PATIENT INSTRUCTIONS
I recommend obtaining the pneumonia (PCV 20) vaccine and Shingles (Shingrix two dose series) as well as latest COVID-19 vaccine given your immunocompromised status.

## 2024-03-29 NOTE — LETTER
3/29/2024       RE: Colette Marcos  [address]  [place] [id number]     Dear Colleague,    Thank you for referring your patient, Colette Marcos, to the [facility] at [facility]. Please see a copy of my visit note below.    Problem List    SAMEER/MDD/ ADHD/PTSD  PCOS  Fatty liver   Pre-diabetes     Assessment & Plan      History of Polyarticular OMERO   Uveitis   Comment: Diagnosed at age of 5 with involvement ankles and knees and later developed recurrent right uveitis.  Arthritis has been in remission since 2013.  She continues on twice daily steroid drops for her uveitis. I am following her ophthalmologists' (Dr. Maynard's) lead in regards to dosing for eye disease as her arthritis continues to be remission.    Treatment history  infliximab infusion - [id number] transitioned because went to college  Methotrexate - started in elementary school. Weekly injections. weaned off slowly first year of college, would vomit day after methotrexate an/ord often felt nauseas the next day    -Continue adalimumab 40 mg every 14 days.   -Continue ophthalmology follow-up    High risk medication use  Comment: Humira  Quant gold, hepatitis serologies negative 2021  Plan:  -Discussed side effect profile of Humira today with patient including increased risk of infections, and increased risk of cancers.  She is aware that she should hold her medication in the setting of fever or infection, and contact me if she is planning to have surgery.  -Toxicity monitoring labs today and every 6 months  -Recommend shingles and pneumonia vaccine as well as latest COVID booster    Preventative care in patient with autoimmune disease  Comment: Patient with metabolic risk factors including obesity, fatty liver disease, and prediabetes  Plan:   -Discussed with patient that people with autoimmune disease have increased risk of cardiovascular disease.  Recommended working with  her primary care doctor to mitigate her cardiovascular risk factors.  -Reports being up-to-date on her Pap smears     Reproductive health  Comment: Patient has 1 female partner.  Currently no plans for conception  Plan:  -Patient knows to contact me if she is planning a pregnancy    Orders Placed This Encounter   Procedures    Comprehensive metabolic panel    Hemoglobin A1c    CRP inflammation      Follow up in 6 months    42 minutes spent on the day of the encounter reviewing tests, records, ordering, communicating results and counseling the patient, care coordination and documentation.     The longitudinal plan of care for the diagnosis(es)/condition(s) as documented were addressed during this visit. Due to the added complexity in care, I will continue to support Colette in the subsequent management and with ongoing continuity of care.      Elizabeth Gaxiola MD  Rheumatology     Subjective      Interim Hx:     Previous pt of Dr. Carolina, transferring care to me today.  Reports no joint pain, swelling, or stiffness while on Humira.  Continues to use steroid eyedrops twice a day and is being followed by ophthalmology closely.  Is hopeful she can wean her steroids when she sees them next this coming September.      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 Diagnosed with uveitis of her right eye around age of 7. Has been on and off steroid eye strops since. Only involvement of her right eye.  Her uveitis worsened around the age of 14 and she required an intraocular steroid injection which unfortunately resulted in the cataract.  She had cataract surgery 2014.  She started Remicade for her arthritis in the beginning of high school in 2013 and  remained on this until her first year of college at which point she was switched to Humira for convenience.  She has had good disease control for her arthritis since the start of Remicade and throughout her Humira.  No infectious complications.        I have reviewed recent labs and images including:  Lab Review  Negative WILLIAN, rheumatoid factor, CCP and HLA-B27      Past Medical History:   Diagnosis Date    Abnormal Pap smear of cervix 07/19/2021    Depression with anxiety     OMERO (juvenile idiopathic arthritis), oligoarthritis, persistent (H) 06/07/2012    Nonsenile cataract     Obesity     Prediabetes     Uveitis      No Known Allergies  Social History     Tobacco Use    Smoking status: Never    Smokeless tobacco: Never   Vaping Use    Vaping Use: Never used   Substance Use Topics    Alcohol use: No    Drug use: No        Current Outpatient Medications:     adalimumab (HUMIRA *CF*) 40 MG/0.4ML pen kit, Inject 0.4 mLs (40 mg) Subcutaneous every 14 days Hold for signs of infection, then seek medical attention., Disp: 2 each, Rfl: 5    etonogestrel-ethinyl estradiol (NUVARING) 0.12-0.015 MG/24HR vaginal ring, Insert one (1) ring vaginally and leave in place for 3 consecutive weeks (21 days), then remove for 1 week., Disp: 1 each, Rfl: 11    metFORMIN (GLUCOPHAGE XR) 500 MG 24 hr tablet, Take 1 tablet (500 mg) by mouth daily (with dinner), Disp: 90 tablet, Rfl: 3    prednisoLONE acetate (PRED FORTE) 1 % ophthalmic suspension, Place 1 drop into the right eye 3 times daily, Disp: 5 mL, Rfl: 2    sertraline (ZOLOFT) 100 MG tablet, Take 1 tablet (100 mg) by mouth daily, Disp: 90 tablet, Rfl: 3     Objective  /84 (BP Location: Left arm, Patient Position: Sitting, Cuff Size: Adult Large)   Pulse 81   LMP 03/01/2024   SpO2 94%   General: alert, well appearing,   HEENT: clear conjunctiva,  Cardiac: normal rate and rhythm, no murmur, rubs or gallops   Pulm: normal respiratory effort, clear to auscultation bilaterally     MSK: normal gait,   Hand, wrist, elbow, and shoulder joints without evidence of synovitis or effusion. Intact and nonpainful range of motion.  knee/hip joints without erythema/effusion/tenderness to palpation and with intact nonpainful range of motion.   Skin: No rashes, warm and well-perfused.

## 2024-04-29 ENCOUNTER — IMMUNIZATION (OUTPATIENT)
Dept: FAMILY MEDICINE | Facility: CLINIC | Age: 24
End: 2024-04-29
Payer: COMMERCIAL

## 2024-04-29 DIAGNOSIS — Z23 ENCOUNTER FOR IMMUNIZATION: Primary | ICD-10-CM

## 2024-04-29 PROCEDURE — 90480 ADMN SARSCOV2 VAC 1/ONLY CMP: CPT

## 2024-04-29 PROCEDURE — 90677 PCV20 VACCINE IM: CPT

## 2024-04-29 PROCEDURE — 90471 IMMUNIZATION ADMIN: CPT

## 2024-04-29 PROCEDURE — 99207 PR NO CHARGE NURSE ONLY: CPT

## 2024-04-29 PROCEDURE — 91320 SARSCV2 VAC 30MCG TRS-SUC IM: CPT

## 2024-04-29 NOTE — PROGRESS NOTES
Prior to immunization administration, verified patients identity using patient s name and date of birth. Please see Immunization Activity for additional information.     Screening Questionnaire for Adult Immunization    Are you sick today?   No   Do you have allergies to medications, food, a vaccine component or latex?   No   Have you ever had a serious reaction after receiving a vaccination?   No   Do you have a long-term health problem with heart, lung, kidney, or metabolic disease (e.g., diabetes), asthma, a blood disorder, no spleen, complement component deficiency, a cochlear implant, or a spinal fluid leak?  Are you on long-term aspirin therapy?   No   Do you have cancer, leukemia, HIV/AIDS, or any other immune system problem?   No   Do you have a parent, brother, or sister with an immune system problem?   No   In the past 3 months, have you taken medications that affect  your immune system, such as prednisone, other steroids, or anticancer drugs; drugs for the treatment of rheumatoid arthritis, Crohn s disease, or psoriasis; or have you had radiation treatments?   Yes   Have you had a seizure, or a brain or other nervous system problem?   No   During the past year, have you received a transfusion of blood or blood    products, or been given immune (gamma) globulin or antiviral drug?   No   For women: Are you pregnant or is there a chance you could become       pregnant during the next month?   No   Have you received any vaccinations in the past 4 weeks?   No     Immunization questionnaire was positive for at least one answer.    I have reviewed the following standing orders:   This patient is due and qualifies for the Covid-19 vaccine.     Click here for COVID-19 Standing Order    I have reviewed the vaccines inclusion and exclusion criteria; No concerns regarding eligibility.   This patient is due and qualifies for the Pneumococcal vaccine.    Click here for Pneumococcal (Adult) Standing Order    I have  reviewed the vaccines inclusion and exclusion criteria;No concerns regarding eligibility.     Patient instructed to remain in clinic for 15 minutes afterwards, and to report any adverse reactions.     Screening performed by Mia Che on 4/29/2024 at 7:54 AM.

## 2024-05-02 NOTE — PROGRESS NOTES
Colette came to clinic today, accompanied by mother, to receive Remicade infusion. PIV placed in right hand using J-tip without difficulty.  NO pre-meds given. Titrated Remicade infusion completed without complication.  Vital signs remained stable throughout.  PIV removed. Patient discharged to home with mother in stable condition after completion of cares.    Please fax patient's H&P to [facility] at [phone].   Patient's surgery is scheduled 5/7/24    Call Katelin at [phone], option 4, with any questions.    Thank you!

## 2024-05-14 ENCOUNTER — TELEPHONE (OUTPATIENT)
Dept: RHEUMATOLOGY | Facility: CLINIC | Age: 24
End: 2024-05-14
Payer: COMMERCIAL

## 2024-05-14 NOTE — TELEPHONE ENCOUNTER
PA Initiation    Medication: HUMIRA *CF* PEN 40 MG/0.4ML SC PNKT  Insurance Company: MED[employer] - Phone [phone] Fax [phone]  Ph[facility] Filling the Rx[facility] - [address] [facility] [address]  Filling Pharmacy Phone:    Filling Pharmacy Fax:    Start Date: 5/14/2024    Key: [id number]

## 2024-05-20 NOTE — TELEPHONE ENCOUNTER
Prior Authorization Approval    Medication: HUMIRA *CF* PEN 40 MG/0.4ML SC PNKT  Authorization Effective Date: 5/20/2024  Authorization Expiration Date: 5/20/2025  Approved Dose/Quantity: 2/28  Reference #: Key: [id number]   Insurance Company: MEDICA - Phone [phone] Fax [phone]  Expected CoPay: $    CoPay Card Available: No    Financial Assistance Needed: no  Which Pharm[facility] is filling the prescription[facility] - [address] [place] [address]  Pharmacy Notified: yes  Patient Notified: yes

## 2024-06-23 ENCOUNTER — HEALTH MAINTENANCE LETTER (OUTPATIENT)
Age: 24
End: 2024-06-23

## 2024-08-23 ENCOUNTER — OFFICE VISIT (OUTPATIENT)
Dept: OPHTHALMOLOGY | Facility: CLINIC | Age: 24
End: 2024-08-23
Attending: OPHTHALMOLOGY
Payer: COMMERCIAL

## 2024-08-23 DIAGNOSIS — H20.11 CHRONIC ANTERIOR UVEITIS OF RIGHT EYE: Primary | ICD-10-CM

## 2024-08-23 DIAGNOSIS — H20.9 UVEITIS: ICD-10-CM

## 2024-08-23 DIAGNOSIS — H40.053 BORDERLINE GLAUCOMA OF BOTH EYES WITH OCULAR HYPERTENSION: ICD-10-CM

## 2024-08-23 PROCEDURE — 99214 OFFICE O/P EST MOD 30 MIN: CPT | Performed by: OPHTHALMOLOGY

## 2024-08-23 PROCEDURE — 99213 OFFICE O/P EST LOW 20 MIN: CPT | Performed by: OPHTHALMOLOGY

## 2024-08-23 RX ORDER — PREDNISOLONE ACETATE 10 MG/ML
1 SUSPENSION/ DROPS OPHTHALMIC 3 TIMES DAILY
Qty: 5 ML | Refills: 2 | Status: SHIPPED | OUTPATIENT
Start: 2024-08-23

## 2024-08-23 ASSESSMENT — CONF VISUAL FIELD
OD_SUPERIOR_NASAL_RESTRICTION: 0
OS_SUPERIOR_NASAL_RESTRICTION: 0
OS_INFERIOR_NASAL_RESTRICTION: 0
OD_INFERIOR_TEMPORAL_RESTRICTION: 0
OS_INFERIOR_TEMPORAL_RESTRICTION: 0
OD_SUPERIOR_TEMPORAL_RESTRICTION: 0
OD_NORMAL: 1
METHOD: COUNTING FINGERS
OD_INFERIOR_NASAL_RESTRICTION: 0
OS_SUPERIOR_TEMPORAL_RESTRICTION: 0
OS_NORMAL: 1

## 2024-08-23 ASSESSMENT — VISUAL ACUITY
OS_CC+: -3
METHOD: SNELLEN - LINEAR
OD_CC+: -2
OD_CC: 20/20
CORRECTION_TYPE: GLASSES
OS_CC: 20/25
CORRECTION_TYPE: GLASSES
METHOD: SNELLEN - LINEAR
OS_CC: 20/20
OD_CC: 20/20

## 2024-08-23 ASSESSMENT — REFRACTION_MANIFEST
OS_AXIS: 180
OS_CYLINDER: +0.50
OS_ADD: --
OD_ADD: +2.50
OS_SPHERE: -1.75
OD_SPHERE: -1.50
OD_AXIS: 070
OD_CYLINDER: +1.25

## 2024-08-23 ASSESSMENT — TONOMETRY
IOP_METHOD: TONOPEN
OD_IOP_MMHG: 23
OS_IOP_MMHG: 24

## 2024-08-23 ASSESSMENT — REFRACTION_WEARINGRX
OS_ADD: - -
OD_ADD: +2.50
OS_SPHERE: -1.50
OD_SPHERE: -1.50
OS_CYLINDER: +0.50
OD_AXIS: 070
OS_AXIS: 180
OD_CYLINDER: +1.00

## 2024-08-23 ASSESSMENT — EXTERNAL EXAM - LEFT EYE: OS_EXAM: NORMAL

## 2024-08-23 ASSESSMENT — SLIT LAMP EXAM - LIDS
COMMENTS: NORMAL
COMMENTS: NORMAL

## 2024-08-23 ASSESSMENT — CUP TO DISC RATIO
OS_RATIO: 0.5
OD_RATIO: 0.5

## 2024-08-23 ASSESSMENT — EXTERNAL EXAM - RIGHT EYE: OD_EXAM: NORMAL

## 2024-08-23 NOTE — NURSING NOTE
Chief Complaints and History of Present Illnesses   Patient presents with    Uveitis Follow-Up     Chronic anterior uveitis of right eye.     Chief Complaint(s) and History of Present Illness(es)       Uveitis Follow-Up              Laterality: right eye    Associated symptoms: floaters.  Negative for dryness, eye pain, photophobia and flashes    Treatments tried: eye drops    Pain scale: 0/10    Comments: Chronic anterior uveitis of right eye.              Comments    Pt states vision is the same.  No pain.  No flashes.  No change to floaters RE.    Pred forte BID right eye   Humira q2wk     JOLIE Valladares August 23, 2024 9:58 AM

## 2024-08-23 NOTE — Clinical Note
Hi Dr. Gaxiola, I saw Colette in uveitis clinic today. She is interested in tapering off Humira, which I think is reasonable since her uveitis has been inactive for ~4 years with most recent substantial flare 10 years ago. Also, she understands that her uveitis is more likely to flare if she proceeds with a taper. I propose decreasing her Humira frequency to q3wk for 3 months and then stopping if her uveitis remains quiescent at 3 month follow up visit with me, but I defer to you for final plan. Thank you, Yaima

## 2024-08-23 NOTE — PROGRESS NOTES
HPI: Colette Marcos is a 24 year old female with a history of OMERO-associated anterior uveitis right eye here for 5 month uveitis clinic follow up. No eye changes or concerns since last visit. No new floaters. No flashes. No photophobia. Colette is motivated to decrease her medications.    Current medications: prednisolone acetate two times a day right eye, Humira q2wk (started 6.7.18). IMT prescribed by rheum at [facility].    Prior medications have included various oral NSAIDs for joints and a short course of oral prednisone for joints (patient did not tolerate well, felt sick per mother's report, patient does not recall because it was so long ago). For ease of use she switched to Humira from Remicade 700 mg (~9.5 mg/kg) q4wk (Fall 2013 - 5.7.18), oral methotrexate 10 mg (was on and off methotrexate since age 8-9 years, previously on 15 mg/wk with taper starting early Jan 2019 - stopped July 20, 2019).    Ocular history:   1. 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 Joint disease (RF-negative, polyarticular) first diagnosed at age 5 years (though mother reports that she may have been symptomatic as early as age 2 years with ankle pain and swelling). Follows with Dr. Arnold ([facility] rheum) for joint disease and immunomodulatory therapy. Uveitis first diagnosed at age 8-9 years. Patient and mother report no history of uveitis left eye. Review of electronic records here (through 01/2014) shows no cell left eye.    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   Most recently had 0.5+ anterior chamber cell right eye in August 2017 (low trace, ~1 cell/hpf, was holding MTX at time of exam) and 0.5+ anterior chamber cell right eye in March 2019 (0-2 cells/hpf) and Melissa/Aug 2020 (1 cell/hpf).  2. History of cystoid macular edema right eye, s/p intravitreal corticosteroid right eye in September 2013 (Dr. Fofana).  3. Pseudophakia right eye s/p cataract extraction/IOL  12.09.14 ([facility]), s/p YAG capsulotomy 04.24.17.  4. Posterior vitreous detachment (PVD) right eye.    Ocular Imaging:  Optos fluorescein angiography 6.25.21: no leakage in either eye    Macular OCT 3.10.23: within normal limits and stable both eyes    Central corneal thickness 8.14.20: 571/569  RNFL OCT 3.10.23 within normal limits and stable vs 8.14.20 both eyes    OVF 24-2 3.10.23:  OD - reliable. Diffuse non-specific loss. MD -5.7 **Patient reports that target was difficult to see (bifocal correction was not done for this test - patient has had cataract surgery right eye only and requires bifocal add for visual field testing right eye).  OS - reliable. Normal. MD 0.6    OVF 24-2 7.14.23 right eye: reliable. No defects.    Impression/Plan:   1. OMERO-associated anterior uveitis right eye, quiescent today.   - Colette is interested in decreasing her systemic medications, and I think it is reasonable to attempt to taper off Humira given her extended uveitis quiescence. We discussed that her uveitis is more likely to flare (requiring additional medications and more frequent eye clinic visits) if she tapers medications, and she reports understanding; she would like to proceed with IMT taper. I propose decreasing her Humira frequency to q3wk for 3 months and then stopping if her uveitis remains quiescent, but will defer to rheumatology for final plan.   - Continue Predforte right eye twice a day    2. Ocular hypertension both eyes. Repeat retinal nerve fiber layer OCT in March 2023 was normal and stable. Visual field testing in 2023 shows no glaucomatous loss.   - Intraocular pressure previously at higher end of normal, symmetric despite Predforte only in right eye; today intraocular pressure in mid-teens   - Monitor; repeat retinal nerve fiber layer OCT next visit    3. Pseudophakia right eye, stable.   - Monitor    Follow up in 3 months, V/T/retinal nerve fiber layer OCT, sooner with changes    Attending Physician  Attestation:  Complete documentation of historical and exam elements from today's encounter can be found in the full encounter summary report (not reduplicated in this progress note).  I personally obtained the chief complaint(s) and history of present illness.  I confirmed and edited as necessary the review of systems, past medical/surgical history, family history, social history, and examination findings as documented by others; and I examined the patient myself.  I personally reviewed the relevant tests, images, and reports as documented above.  I formulated and edited as necessary the assessment and plan and discussed the findings and management plan with the patient and family.  - Yaima Maynard M.D.

## 2024-09-09 ENCOUNTER — MYC MEDICAL ADVICE (OUTPATIENT)
Dept: OBGYN | Facility: CLINIC | Age: 24
End: 2024-09-09
Payer: COMMERCIAL

## 2024-09-09 DIAGNOSIS — Z12.4 PAP SMEAR FOR CERVICAL CANCER SCREENING: Primary | ICD-10-CM

## 2024-09-10 ENCOUNTER — MYC MEDICAL ADVICE (OUTPATIENT)
Dept: FAMILY MEDICINE | Facility: CLINIC | Age: 24
End: 2024-09-10
Payer: COMMERCIAL

## 2024-09-10 DIAGNOSIS — Z13.1 SCREENING FOR DIABETES MELLITUS: Primary | ICD-10-CM

## 2024-09-10 RX ORDER — LORAZEPAM 0.5 MG/1
0.5 TABLET ORAL ONCE
Qty: 1 TABLET | Refills: 0 | Status: SHIPPED | OUTPATIENT
Start: 2024-09-10 | End: 2024-09-10

## 2024-09-10 NOTE — TELEPHONE ENCOUNTER
Pt confirmed pharmacy   Routing to provider to sign  Agueda Dennis RN on 9/10/2024 at 10:44 AM   [facility]

## 2024-09-17 ENCOUNTER — LAB (OUTPATIENT)
Dept: LAB | Facility: CLINIC | Age: 24
End: 2024-09-17
Payer: COMMERCIAL

## 2024-09-17 DIAGNOSIS — M08.00 JUVENILE RHEUMATOID ARTHRITIS (H): ICD-10-CM

## 2024-09-17 DIAGNOSIS — Z13.1 SCREENING FOR DIABETES MELLITUS: ICD-10-CM

## 2024-09-17 LAB
ALBUMIN SERPL BCG-MCNC: 4.2 G/DL (ref 3.5–5.2)
ALP SERPL-CCNC: 87 U/L (ref 40–150)
ALT SERPL W P-5'-P-CCNC: 56 U/L (ref 0–50)
ANION GAP SERPL CALCULATED.3IONS-SCNC: 14 MMOL/L (ref 7–15)
AST SERPL W P-5'-P-CCNC: 51 U/L (ref 0–45)
BASOPHILS # BLD AUTO: 0.1 10E3/UL (ref 0–0.2)
BASOPHILS NFR BLD AUTO: 1 %
BILIRUB SERPL-MCNC: 0.2 MG/DL
BUN SERPL-MCNC: 10.7 MG/DL (ref 6–20)
CALCIUM SERPL-MCNC: 9.4 MG/DL (ref 8.8–10.4)
CHLORIDE SERPL-SCNC: 102 MMOL/L (ref 98–107)
CREAT SERPL-MCNC: 0.79 MG/DL (ref 0.51–0.95)
CRP SERPL-MCNC: 20.7 MG/L
EGFRCR SERPLBLD CKD-EPI 2021: >90 ML/MIN/1.73M2
EOSINOPHIL # BLD AUTO: 0.3 10E3/UL (ref 0–0.7)
EOSINOPHIL NFR BLD AUTO: 4 %
ERYTHROCYTE [DISTWIDTH] IN BLOOD BY AUTOMATED COUNT: 12.7 % (ref 10–15)
GLUCOSE SERPL-MCNC: 108 MG/DL (ref 70–99)
HBA1C MFR BLD: 5.7 % (ref 0–5.6)
HCO3 SERPL-SCNC: 21 MMOL/L (ref 22–29)
HCT VFR BLD AUTO: 36 % (ref 35–47)
HGB BLD-MCNC: 11.9 G/DL (ref 11.7–15.7)
IMM GRANULOCYTES # BLD: 0 10E3/UL
IMM GRANULOCYTES NFR BLD: 0 %
LYMPHOCYTES # BLD AUTO: 3.3 10E3/UL (ref 0.8–5.3)
LYMPHOCYTES NFR BLD AUTO: 39 %
MCH RBC QN AUTO: 28.1 PG (ref 26.5–33)
MCHC RBC AUTO-ENTMCNC: 33.1 G/DL (ref 31.5–36.5)
MCV RBC AUTO: 85 FL (ref 78–100)
MONOCYTES # BLD AUTO: 0.6 10E3/UL (ref 0–1.3)
MONOCYTES NFR BLD AUTO: 7 %
NEUTROPHILS # BLD AUTO: 4.1 10E3/UL (ref 1.6–8.3)
NEUTROPHILS NFR BLD AUTO: 49 %
PLATELET # BLD AUTO: 387 10E3/UL (ref 150–450)
POTASSIUM SERPL-SCNC: 4.6 MMOL/L (ref 3.4–5.3)
PROT SERPL-MCNC: 8.7 G/DL (ref 6.4–8.3)
RBC # BLD AUTO: 4.24 10E6/UL (ref 3.8–5.2)
SODIUM SERPL-SCNC: 137 MMOL/L (ref 135–145)
WBC # BLD AUTO: 8.3 10E3/UL (ref 4–11)

## 2024-09-17 PROCEDURE — 80053 COMPREHEN METABOLIC PANEL: CPT

## 2024-09-17 PROCEDURE — 36415 COLL VENOUS BLD VENIPUNCTURE: CPT

## 2024-09-17 PROCEDURE — 83036 HEMOGLOBIN GLYCOSYLATED A1C: CPT

## 2024-09-17 PROCEDURE — 86140 C-REACTIVE PROTEIN: CPT

## 2024-09-17 PROCEDURE — 85025 COMPLETE CBC W/AUTO DIFF WBC: CPT

## 2024-09-17 NOTE — PROGRESS NOTES
"[facility]  OB/GYN Clinic Note    SUBJECTIVE:                                                   Colette Marcos is a 24 year old female who presents to clinic today for the following health issue(s):  Patient presents with:  Follow Up      Additional information: US follow up     HPI:  Seen today for US follow-up of right ovarian cyst and pap smear. Received oral anxiolytic meds prior to US due to hx of painful pelvic exams.   Hx of ASCUS pap in .   Denies right sided pelvic/abdominal pain or symtpoms related to ovarian cyst    No LMP recorded. (Menstrual status: Birth Control)..   Patient is sexually active, .  Using nuvaring for contraception.    reports that she has never smoked. She has never used smokeless tobacco.  STD testing offered?  Declined    Health maintenance updated:  yes      OBJECTIVE:     /82   Ht 1.626 m (5' 4\")   Wt 93.9 kg (207 lb)   BMI 35.53 kg/m    Body mass index is 35.53 kg/m .    Exam:  Constitutional:  Appearance: Well nourished, well developed alert, in no acute distress  Pelvic Exam:  External Genitalia:     Normal appearance for age, no discharge present, no tenderness present, no inflammatory lesions present, color normal  Vagina:     Normal vaginal vault without central or paravaginal defects, no discharge present, no inflammatory lesions present, no masses present  Perineum:     Perineum within normal limits, no evidence of trauma, no rashes or skin lesions present    Pubic Hair:     Normal pubic hair distribution for age  Genitalia and Groin:     No rashes present, no lesions present, no areas of discoloration, no masses present     In-Clinic Test Results:  Results for orders placed or performed in visit on 24 (from the past 24 hour(s))   US Pelvic Complete with Transvaginal    Narrative    Table formatting from the original result was not included.     Gynecological Ultrasound Report  Pelvic U/S - Transabdominal and " Recheck on atorva 10  Zero calcium score  Tolerating meds well.    Transvaginal  [facility]  Referring Provider: Dana Lopez MD  Sonographer:  Emmett Buchanan RDMS  Indication: Cyst of right ovary  LMP: No LMP recorded. (Menstrual status: Birth Control).  History:   Gynecological Ultrasonography:   Uterus: anteflexed. Contour is smooth/regular.  Size: 6.7 x 3.3 x 2.9 cm  Endometrium: Thickness Total 4.3 mm  Findings:   Right Ovary: 1.9 x 2.0 x 3.0 cm. Wnl and Simple paraovarian cyst 3.9 x 3.9   x 3.6 cm  Left Ovary: 2.2 x 1.9 x 2.7 cm. Wnl  Cul de Sac Free Fluid: Trace  Technique: Transvaginal Imaging performed  Transabdominal Imaging performed     Impression:   The uterus was visualized and no abnormalities were seen.  The endometrium appeared normal.  A simple paraovarian cyst was noted adjacent to the the right ovary,   measuring 3.9cm.     Dana Lopez MD, [facility], [place]  24           ASSESSMENT/PLAN:                                                        ICD-10-CM    1. Cyst of right ovary  N83.201       2. Atypical squamous cells of undetermined significance (ASCUS) on Papanicolaou smear of cervix  R87.610       3. Deep dyspareunia in female  N94.12 Case Request: Pap smear, pelvic exam under anesthesia     Case Request: Pap smear, pelvic exam under anesthesia        Colette Marcos is a 24 year old  with stable/decreasing in size right ovarian cyst. Asymptomatic. No further follow-up unless symptoms recur.     Pap smear attempted today after oral anxiolytic and lidocaine gel. Unable to tolerate. Discussed options. Recommend pelvic exam under anesthesia and pap smear. Orders placed for scheduling.     Dana Lopez MD, [facility] [place]  24

## 2024-09-18 NOTE — PROGRESS NOTES
Problem List    SAMEER/MDD/ ADHD/PTSD  PCOS  Fatty liver   Pre-diabetes     Assessment & Plan       History of Polyarticular OMERO   Uveitis   Comment: Diagnosed at age of 5 with involvement ankles and knees and later developed recurrent right uveitis.  Arthritis has been in remission since 2013. Per conversation with patient's ophthalmologists' (Dr. Maynard's) uveitis has been well controlled and OK to start wean off biologic.     Treatment history  infliximab infusion - 9260-8900 transitioned because went to college  Methotrexate - started in elementary school. Weekly injections. weaned off slowly first year of college, would vomit day after methotrexate an/ord often felt nauseas the next day  Humira 2018- spaced to every 3 weeks 9/2024       She continues to have elevated inflammatory markers but clinically denies any joint symptoms and uveitis has been quite. Patient desires weaning off medication and I am in agreement with trialing this.     -decrease adalimumab 40 mg to every 21 days.   -Continue ophthalmology follow-up    High risk medication use  Comment: Humira  Quant gold, hepatitis serologies negative 2021  S/p pneumonia vaccine   Plan:  -Toxicity monitoring labs  every 6 months  -Recommend influenza vaccine this season and shingles vaccine     Preventative care in patient with autoimmune disease  Comment: Patient with metabolic risk factors including obesity, fatty liver disease, and prediabetes  Plan:   -Discussed with patient that people with autoimmune disease have increased risk of cardiovascular disease.  Recommended working with her primary care doctor to mitigate her cardiovascular risk factors.  -Reports being up-to-date on her Pap smears   -looking for a new PCP to help her discuss PCOS     Reproductive health  Comment: Patient has 1 female partner.  Currently no plans for conception  Plan:  -Patient knows to contact me if she is planning a pregnancy if still on medication     No orders of the  defined types were placed in this encounter.     Follow up in 3-4 months    20 minutes spent on the day of the encounter reviewing tests, records, ordering, communicating results and counseling the patient, care coordination and documentation.     The longitudinal plan of care for the diagnosis(es)/condition(s) as documented were addressed during this visit. Due to the added complexity in care, I will continue to support Colette in the subsequent management and with ongoing continuity of care.      Elizabeth Gaxiola MD  Rheumatology     Subjective     Reports no joint pain, swelling or stiffness. Received a message from Dr. Maynard  that OK to wean meds. Since I last saw her, has been monitoring a cyst on her ovaries. Overall doing well and no major updates. No interim infections. We discussed her elevated inflammatory markers. No illness at the time.       Disease History:      Diagnosed around age of 5 with involvement of her ankles and knees. Diagnosed with uveitis of her right eye around age of 7. Has been on and off steroid eye strops since. Only involvement of her right eye.  Her uveitis worsened around the age of 14 and she required an intraocular steroid injection which unfortunately resulted in the cataract.  She had cataract surgery 2014.  She started Remicade for her arthritis in the beginning of high school in 2013 and remained on this until her first year of college at which point she was switched to Humira for convenience.  She has had good disease control for her arthritis since the start of Remicade and then Humira.  No infectious complications.    Working full time in human resources for [employer]   At school for accounting   No Tobacco   Once a month alcohol   One female partner          I have reviewed recent labs and images including:  Lab Review  Negative WILLIAN, rheumatoid factor, CCP and HLA-B27      Past Medical History:   Diagnosis Date    Abnormal Pap smear of cervix 07/19/2021    Depression  "with anxiety     OMERO (juvenile idiopathic arthritis), oligoarthritis, persistent (H) 06/07/2012    Nonsenile cataract     Obesity     Prediabetes     Uveitis      No Known Allergies  Social History     Tobacco Use    Smoking status: Never    Smokeless tobacco: Never   Vaping Use    Vaping status: Never Used   Substance Use Topics    Alcohol use: No    Drug use: No        Current Outpatient Medications:     adalimumab (HUMIRA *CF*) 40 MG/0.4ML pen kit, Inject 0.4 mLs (40 mg) subcutaneously every 21 days. Hold for signs of infection, then seek medical attention., Disp: 0.8 mL, Rfl: 3    etonogestrel-ethinyl estradiol (NUVARING) 0.12-0.015 MG/24HR vaginal ring, Insert one (1) ring vaginally and leave in place for 3 consecutive weeks (21 days), then remove for 1 week., Disp: 1 each, Rfl: 11    metFORMIN (GLUCOPHAGE XR) 500 MG 24 hr tablet, Take 1 tablet (500 mg) by mouth daily (with dinner), Disp: 90 tablet, Rfl: 3    prednisoLONE acetate (PRED FORTE) 1 % ophthalmic suspension, Place 1 drop into the right eye 3 times daily., Disp: 5 mL, Rfl: 2    sertraline (ZOLOFT) 100 MG tablet, Take 1 tablet (100 mg) by mouth daily, Disp: 90 tablet, Rfl: 3     Objective   /88   Pulse 76   Ht 1.626 m (5' 4\")   BMI 35.67 kg/m    General: alert, well appearing,   HEENT: clear conjunctiva,  Cardiac: warm and well perfused    Pulm: normal respiratory effort,   MSK:   Hand, wrist, elbow, and shoulder joints without evidence of synovitis or effusion. Intact and nonpainful range of motion.  knee/hip joints without erythema/effusion/tenderness to palpation and with intact nonpainful range of motion.   Skin: No rashes, warm and well-perfused.        "

## 2024-09-20 ENCOUNTER — OFFICE VISIT (OUTPATIENT)
Dept: RHEUMATOLOGY | Facility: CLINIC | Age: 24
End: 2024-09-20
Attending: STUDENT IN AN ORGANIZED HEALTH CARE EDUCATION/TRAINING PROGRAM
Payer: COMMERCIAL

## 2024-09-20 VITALS
SYSTOLIC BLOOD PRESSURE: 128 MMHG | HEART RATE: 76 BPM | BODY MASS INDEX: 35.67 KG/M2 | HEIGHT: 64 IN | DIASTOLIC BLOOD PRESSURE: 88 MMHG

## 2024-09-20 DIAGNOSIS — M08.3 JIA (JUVENILE IDIOPATHIC ARTHRITIS), POLYARTHRITIS, RHEUMAT FACTOR NEG (H): ICD-10-CM

## 2024-09-20 PROCEDURE — 99214 OFFICE O/P EST MOD 30 MIN: CPT | Performed by: STUDENT IN AN ORGANIZED HEALTH CARE EDUCATION/TRAINING PROGRAM

## 2024-09-20 PROCEDURE — G2211 COMPLEX E/M VISIT ADD ON: HCPCS | Performed by: STUDENT IN AN ORGANIZED HEALTH CARE EDUCATION/TRAINING PROGRAM

## 2024-09-20 PROCEDURE — 99213 OFFICE O/P EST LOW 20 MIN: CPT | Performed by: STUDENT IN AN ORGANIZED HEALTH CARE EDUCATION/TRAINING PROGRAM

## 2024-09-20 ASSESSMENT — PAIN SCALES - GENERAL: PAINLEVEL: NO PAIN (0)

## 2024-09-20 NOTE — PATIENT INSTRUCTIONS
Recommendations for primary care physician     Dr. Tiffanie Palacios ([place]) [place]   Dr. Cindy Knox ([place]) Carlos Alberto James ([facility]) [place]   Dr. Moni Smith ([facility]) [place]      Report to christo champagne. Gave christo vancomycin to give after zosyn is done.

## 2024-09-20 NOTE — NURSING NOTE
"Chief Complaint   Patient presents with    Follow Up     6 month     /88   Pulse 76   Ht 1.626 m (5' 4\")   BMI 35.67 kg/m    Christi Encinas MA on 9/20/2024 at 8:18 AM    "

## 2024-09-20 NOTE — LETTER
9/20/2024       RE: Colette Marcos  [address]  [place] [address]     Dear Colleague,    Thank you for referring your patient, Colette Marcos, to the [facility] RHEUMATOLOGY at [facility]. Please see a copy of my visit note below.    Problem List    SAMEER/MDD/ ADHD/PTSD  PCOS  Fatty liver   Pre-diabetes     Assessment & Plan      History of Polyarticular OMERO   Uveitis   Comment: Diagnosed at age of 5 with involvement ankles and knees and later developed recurrent right uveitis.  Arthritis has been in remission since 2013. Per conversation with patient's ophthalmologists' (Dr. Maynard's) uveitis has been well controlled and OK to start wean off biologic.     Treatment history  infliximab infusion - [id number] transitioned because went to college  Methotrexate - started in elementary school. Weekly injections. weaned off slowly first year of college, would vomit day after methotrexate an/ord often felt nauseas the next day  Humira 2018- spaced to every 3 weeks 9/2024       She continues to have elevated inflammatory markers but clinically denies any joint symptoms and uveitis has been quite. Patient desires weaning off medication and I am in agreement with trialing this.     -decrease adalimumab 40 mg to every 21 days.   -Continue ophthalmology follow-up    High risk medication use  Comment: Humira  Quant gold, hepatitis serologies negative 2021  S/p pneumonia vaccine   Plan:  -Toxicity monitoring labs  every 6 months  -Recommend influenza vaccine this season and shingles vaccine     Preventative care in patient with autoimmune disease  Comment: Patient with metabolic risk factors including obesity, fatty liver disease, and prediabetes  Plan:   -Discussed with patient that people with autoimmune disease have increased risk of cardiovascular disease.  Recommended working with her primary care doctor to mitigate her cardiovascular risk  factors.  -Reports being up-to-date on her Pap smears   -looking for a new PCP to help her discuss PCOS     Reproductive health  Comment: Patient has 1 female partner.  Currently no plans for conception  Plan:  -Patient knows to contact me if she is planning a pregnancy if still on medication     No orders of the defined types were placed in this encounter.     Follow up in 3-4 months    20 minutes spent on the day of the encounter reviewing tests, records, ordering, communicating results and counseling the patient, care coordination and documentation.     The longitudinal plan of care for the diagnosis(es)/condition(s) as documented were addressed during this visit. Due to the added complexity in care, I will continue to support Colette in the subsequent management and with ongoing continuity of care.      Elizabeth Gaxiola MD  Rheumatology     Subjective    Reports no joint pain, swelling or stiffness. Received a message from Dr. Maynard  that OK to wean meds. Since I last saw her, has been monitoring a cyst on her ovaries. Overall doing well and no major updates. No interim infections. We discussed her elevated inflammatory markers. No illness at the time.       Disease History:      Diagnosed around age of 5 with involvement of her ankles and knees. Diagnosed with uveitis of her right eye around age of 7. Has been on and off steroid eye strops since. Only involvement of her right eye.  Her uveitis worsened around the age of 14 and she required an intraocular steroid injection which unfortunately resulted in the cataract.  She had cataract surgery 2014.  She started Remicade for her arthritis in the beginning of high school in 2013 and remained on this until her first year of college at which point she was switched to Humira for convenience.  She has had good disease control for her arthritis since the start of Remicade and then Humira.  No infectious complications.    Working full time in human resources for  "[employer]   At school for accounting   No Tobacco   Once a month alcohol   One female partner          I have reviewed recent labs and images including:  Lab Review  Negative WILLIAN, rheumatoid factor, CCP and HLA-B27      Past Medical History:   Diagnosis Date     Abnormal Pap smear of cervix 07/19/2021     Depression with anxiety      OMERO (juvenile idiopathic arthritis), oligoarthritis, persistent (H) 06/07/2012     Nonsenile cataract      Obesity      Prediabetes      Uveitis      No Known Allergies  Social History     Tobacco Use     Smoking status: Never     Smokeless tobacco: Never   Vaping Use     Vaping status: Never Used   Substance Use Topics     Alcohol use: No     Drug use: No        Current Outpatient Medications:      adalimumab (HUMIRA *CF*) 40 MG/0.4ML pen kit, Inject 0.4 mLs (40 mg) subcutaneously every 21 days. Hold for signs of infection, then seek medical attention., Disp: 0.8 mL, Rfl: 3     etonogestrel-ethinyl estradiol (NUVARING) 0.12-0.015 MG/24HR vaginal ring, Insert one (1) ring vaginally and leave in place for 3 consecutive weeks (21 days), then remove for 1 week., Disp: 1 each, Rfl: 11     metFORMIN (GLUCOPHAGE XR) 500 MG 24 hr tablet, Take 1 tablet (500 mg) by mouth daily (with dinner), Disp: 90 tablet, Rfl: 3     prednisoLONE acetate (PRED FORTE) 1 % ophthalmic suspension, Place 1 drop into the right eye 3 times daily., Disp: 5 mL, Rfl: 2     sertraline (ZOLOFT) 100 MG tablet, Take 1 tablet (100 mg) by mouth daily, Disp: 90 tablet, Rfl: 3     Objective  /88   Pulse 76   Ht 1.626 m (5' 4\")   BMI 35.67 kg/m    General: alert, well appearing,   HEENT: clear conjunctiva,  Cardiac: warm and well perfused    Pulm: normal respiratory effort,   MSK:   Hand, wrist, elbow, and shoulder joints without evidence of synovitis or effusion. Intact and nonpainful range of motion.  knee/hip joints without erythema/effusion/tenderness to palpation and with intact nonpainful range of motion.   Skin: No " rashes, warm and well-perfused.          Again, thank you for allowing me to participate in the care of your patient.      Sincerely,    Elizabeth Gaxiola MD

## 2024-09-26 ENCOUNTER — OFFICE VISIT (OUTPATIENT)
Dept: OBGYN | Facility: CLINIC | Age: 24
End: 2024-09-26
Attending: STUDENT IN AN ORGANIZED HEALTH CARE EDUCATION/TRAINING PROGRAM
Payer: COMMERCIAL

## 2024-09-26 ENCOUNTER — TELEPHONE (OUTPATIENT)
Dept: OBGYN | Facility: CLINIC | Age: 24
End: 2024-09-26

## 2024-09-26 ENCOUNTER — ANCILLARY PROCEDURE (OUTPATIENT)
Dept: ULTRASOUND IMAGING | Facility: CLINIC | Age: 24
End: 2024-09-26
Attending: STUDENT IN AN ORGANIZED HEALTH CARE EDUCATION/TRAINING PROGRAM
Payer: COMMERCIAL

## 2024-09-26 VITALS
WEIGHT: 207 LBS | BODY MASS INDEX: 35.34 KG/M2 | HEIGHT: 64 IN | DIASTOLIC BLOOD PRESSURE: 82 MMHG | SYSTOLIC BLOOD PRESSURE: 114 MMHG

## 2024-09-26 DIAGNOSIS — N83.201 CYST OF RIGHT OVARY: ICD-10-CM

## 2024-09-26 DIAGNOSIS — N83.201 CYST OF RIGHT OVARY: Primary | ICD-10-CM

## 2024-09-26 DIAGNOSIS — R87.610 ATYPICAL SQUAMOUS CELLS OF UNDETERMINED SIGNIFICANCE (ASCUS) ON PAPANICOLAOU SMEAR OF CERVIX: ICD-10-CM

## 2024-09-26 DIAGNOSIS — N94.12 DEEP DYSPAREUNIA IN FEMALE: ICD-10-CM

## 2024-09-26 PROCEDURE — 99459 PELVIC EXAMINATION: CPT | Performed by: STUDENT IN AN ORGANIZED HEALTH CARE EDUCATION/TRAINING PROGRAM

## 2024-09-26 PROCEDURE — 76856 US EXAM PELVIC COMPLETE: CPT | Performed by: STUDENT IN AN ORGANIZED HEALTH CARE EDUCATION/TRAINING PROGRAM

## 2024-09-26 PROCEDURE — 99213 OFFICE O/P EST LOW 20 MIN: CPT | Performed by: STUDENT IN AN ORGANIZED HEALTH CARE EDUCATION/TRAINING PROGRAM

## 2024-09-26 PROCEDURE — 76830 TRANSVAGINAL US NON-OB: CPT | Performed by: STUDENT IN AN ORGANIZED HEALTH CARE EDUCATION/TRAINING PROGRAM

## 2024-09-26 RX ORDER — LORAZEPAM 0.5 MG/1
TABLET ORAL
COMMUNITY
Start: 2024-09-11

## 2024-09-26 NOTE — TELEPHONE ENCOUNTER
ERAS BAG GIVEN N/A  ERAS INSTRUCTIONS EXPLAINED N/A    ASSIST: (MD, PA, CNM, NONE) None    H&P TO BE COMPLETED BY:   PCP  FOR H&P TO BE DONE BY SURGEON     SAME DAY/OBSERVATION/INPATIENT: STRAIGHT SAME DAY  EQUIPMENT: None  VENDOR NEEDED AT CASE: No  IF IUD WHAT BRAND None  LABS/SPECIAL INSTRUCTIONS: N/A  TIME OFF WORK: 1 day  DO YOU ESTIMATE EXTRA TIME NEEDED THAN YOUR AVERAGE ON THIS CASE? no  IF YES HOW MUCH EXTRA TIME IN MINUTES? N/a  POST OP TO BE SCHEDULED not needed

## 2024-09-27 DIAGNOSIS — N92.1 MENORRHAGIA WITH IRREGULAR CYCLE: ICD-10-CM

## 2024-09-27 RX ORDER — ETONOGESTREL AND ETHINYL ESTRADIOL VAGINAL RING .015; .12 MG/D; MG/D
RING VAGINAL
Qty: 3 EACH | Refills: 0 | Status: SHIPPED | OUTPATIENT
Start: 2024-09-27

## 2024-10-29 NOTE — TELEPHONE ENCOUNTER
Due to no contact, closing encounter    Will leave case request open     Dina Greene  Surgery Scheduler

## 2024-11-04 ENCOUNTER — HOSPITAL ENCOUNTER (OUTPATIENT)
Facility: CLINIC | Age: 24
End: 2024-11-04
Attending: STUDENT IN AN ORGANIZED HEALTH CARE EDUCATION/TRAINING PROGRAM | Admitting: STUDENT IN AN ORGANIZED HEALTH CARE EDUCATION/TRAINING PROGRAM
Payer: COMMERCIAL

## 2024-11-04 NOTE — TELEPHONE ENCOUNTER
Type of surgery: EUA, PAP  Location of surgery: [facility]  Date and time of surgery: 12/16/2024 7:30a  Surgeon: DAREK  Pre-Op Appt Date: PCP  Post-Op Appt Date: NA   Packet sent out:  MAILED 11/4/2024  Pre-cert/Authorization completed:   TBD  Date: 11/4/2024 Annita Greene  Surgery Scheduler    CPT [id number]

## 2024-11-25 NOTE — TELEPHONE ENCOUNTER
Per pt request this case has been cancelled  Spoke wEhsan at [facility] for changes    Dina Greene  Surgery Scheduler

## 2024-12-19 ENCOUNTER — PATIENT OUTREACH (OUTPATIENT)
Dept: CARE COORDINATION | Facility: CLINIC | Age: 24
End: 2024-12-19
Payer: COMMERCIAL

## 2025-01-02 ENCOUNTER — PATIENT OUTREACH (OUTPATIENT)
Dept: CARE COORDINATION | Facility: CLINIC | Age: 25
End: 2025-01-02
Payer: COMMERCIAL

## 2025-01-23 DIAGNOSIS — L65.9 BALDNESS: Primary | ICD-10-CM

## 2025-01-28 ENCOUNTER — NURSE TRIAGE (OUTPATIENT)
Dept: FAMILY MEDICINE | Facility: CLINIC | Age: 25
End: 2025-01-28
Payer: COMMERCIAL

## 2025-01-28 NOTE — TELEPHONE ENCOUNTER
Pt calling, wondering about making appt for possible finger sprain.   Pt fell last night, which initially caused pain.  See triage protocol for further details.     Advised pt be seen in office today.   Discussed option of making appt with [facility] vs [facility] vs [facility].   Pt expressed agreement with plan to go to [facility].   All questions were answered.   Gave options for [facility] including:    [facility]: Same Day Access  [address]  [place] [address]  [phone]    [facility]:  Hours: 8 AM to 8 PM seven days a week.  Locations: [facility], [facility], [facility], [place], [facility], [place], [place], [place]    Advised to call back if symptoms worsen or new symptoms arise. Advised to present to emergency department in the case of medical emergency. Red flag symptoms reviewed in detail.     Dilcia MACIAS RN     Reason for Disposition   Patient wants to be seen    Additional Information   Negative: Major bleeding (actively dripping or spurting) that can't be stopped   Negative: Sounds like a life-threatening emergency to the triager   Negative: Wound looks infected (e.g., spreading redness, pus)   Negative: Caused by animal bite   Negative: Amputation   Negative: High-pressure injection injury (e.g., from paint gun, usually work-related)   Negative: Looks like a broken bone or dislocated joint (e.g., crooked or deformed)   Negative: Skin is split open or gaping (length > 1/2 inch or 12 mm)   Negative: Cut or scrape is very deep (e.g., can see bone or tendons)   Negative: Bleeding won't stop after 10 minutes of direct pressure (using correct technique)   Negative: Dirt in the wound and not removed after 15 minutes of scrubbing   Negative: Cut with numbness (loss of sensation) of finger   Negative: Fingernail is partially torn from a crush injury  (Exception: Torn nail from catching it on  something.)   Negative: Sounds like a serious injury to the triager   Negative: Looks infected (e.g., spreading redness, red streak, pus)   Negative: Fingernail is completely torn off   Negative: Base of fingernail has popped out from under skin fold (cuticle)   Negative: HIV positive or severe immunodeficiency (severely weak immune system) and DIRTY cut or scrape   Negative: SEVERE pain (e.g., excruciating)   Negative: MODERATE-SEVERE pain and blood present under the nail (usually > 50% of nail bed)   Negative: Finger joint can't be opened (straightened) or closed (bent) completely   Negative: No prior tetanus shots (or is not fully vaccinated) and any wound (e.g., cut or scrape)    Protocols used: Finger Injury-A-OH

## 2025-02-06 ENCOUNTER — VIRTUAL VISIT (OUTPATIENT)
Dept: FAMILY MEDICINE | Facility: CLINIC | Age: 25
End: 2025-02-06
Payer: COMMERCIAL

## 2025-02-06 DIAGNOSIS — R79.82 ELEVATED C-REACTIVE PROTEIN (CRP): ICD-10-CM

## 2025-02-06 DIAGNOSIS — R09.81 NASAL CONGESTION: Primary | ICD-10-CM

## 2025-02-06 DIAGNOSIS — M08.00 JUVENILE RHEUMATOID ARTHRITIS (H): ICD-10-CM

## 2025-02-06 DIAGNOSIS — R19.5 LOOSE STOOLS: ICD-10-CM

## 2025-02-06 DIAGNOSIS — Z83.79 FAMILY HISTORY OF GI DISORDER: ICD-10-CM

## 2025-02-06 RX ORDER — FLUTICASONE PROPIONATE 50 MCG
2 SPRAY, SUSPENSION (ML) NASAL DAILY
Qty: 16 G | Refills: 3 | Status: SHIPPED | OUTPATIENT
Start: 2025-02-06

## 2025-02-06 NOTE — PROGRESS NOTES
"Colette is a 24 year old who is being evaluated via a billable video visit.    How would you like to obtain your AVS? MyChart  If the video visit is dropped, the invitation should be resent by: Text to cell phone: [phone]  Will anyone else be joining your video visit? No      Assessment & Plan     (R09.81) Nasal congestion  (primary encounter diagnosis)  Comment:   Plan: fluticasone (FLONASE) 50 MCG/ACT nasal spray            (M08.00) Juvenile rheumatoid arthritis (H)  Comment: stable and still noting high CRP  Plan:     (R19.5) Loose stools  Comment: loose stools and mother has IBD  Tril of elimination diet and colonoscopy if CRP is not better OR if any rectal bleeding is noted  Plan: CRP, inflammation            (R79.82) Elevated C-reactive protein (CRP)  Comment: possibly due to IBD/IBS or BC ring - will hold nuva ring, try elimination diet    Recehck CRP 1 month  Plan: CRP, inflammation                   BMI  Estimated body mass index is 35.53 kg/m  as calculated from the following:    Height as of 1/10/25: 1.626 m (5' 4\").    Weight as of 9/26/24: 93.9 kg (207 lb).             Subjective   Colette is a 24 year old, presenting for the following health issues:  Breathing Problem (Breathing through nose )        2/6/2025    12:32 PM   Additional Questions   Roomed by hardeep rodriguez   Accompanied by didier         2/6/2025    12:32 PM   Patient Reported Additional Medications   Patient reports taking the following new medications n/a     History of Present Illness       Reason for visit:  Difficulty breathing through nose  Symptom onset:  More than a month  Symptoms include:  Mouth breathing, snoring  Symptom intensity:  Severe  Symptom progression:  Staying the same  Had these symptoms before:  Yes  Has tried/received treatment for these symptoms:  No   She is taking medications regularly.     This has been over a mnth  Nose is often stuffy or runny  Does not feel sick  Recently did have a viral infection and this has " resolved  Fnds herself mouth breathing   And mouth breaths at night  Snores a lot    Allergies are noted seasonally  She has no asthma may have had exercise induced reactive airways  Her dad and brother may have had these.    Nasal stuffiness is more on right than left    Has tried allergy pills nightly    Has been on humira for eye inflammation and is now off for a trial  Her CRP is elevated - joints are fine eye is fine  Her mother has IBD and is sensitive to wheat and other things pt has started eating more fiber    Left the call at 2/6/2025, 1:40:43 pm.  You were on the call for .      Patient Active Problem List   Diagnosis    Juvenile rheumatoid arthritis (H)    Secondary iridocyclitis, noninfectious    Cystoid macular degeneration of retina    Chondromalacia of patella    Cataract    Adjustment disorder with depressed mood    Uveitis    Iritis    Atypical squamous cells of undetermined significance (ASCUS) on Papanicolaou smear of cervix    PMDD (premenstrual dysphoric disorder)          Review of Systems  Constitutional, HEENT, cardiovascular, pulmonary, gi and gu systems are negative, except as otherwise noted.      Objective           Vitals:  No vitals were obtained today due to virtual visit.    Physical Exam   GENERAL: alert and no distress  EYES: Eyes grossly normal to inspection.  No discharge or erythema, or obvious scleral/conjunctival abnormalities.  RESP: No audible wheeze, cough, or visible cyanosis.    SKIN: Visible skin clear. No significant rash, abnormal pigmentation or lesions.  NEURO: Cranial nerves grossly intact.  Mentation and speech appropriate for age.  PSYCH: Appropriate affect, tone, and pace of words    CRP Inflammation   Date Value Ref Range Status   01/24/2025 25.80 (H) <5.00 mg/L Final          Video-Visit Details    Call started at 1:18  Left the call at 2/6/2025, 1:40:43 pm.  You were on the call for 18 minutes  Type of service:  Video Visit   Originating Location (pt.  Location): Home    Distant Location (provider location):  On-site  Platform used for Video Visit: Lisa  Signed Electronically by: aYima Jaime MD

## 2025-02-06 NOTE — PATIENT INSTRUCTIONS
"Try Afrin nasal spray - generic is Oxymetalazone for 3 nights then use netti pot then flonase at night    Use flonase again in the morning  take this twice a day    Use antihistamine - consider different brand like Zyrtec/cetirizine or allegra/fexofenadine - these can be used twice per day    Dust and clean bedding and home    Try this for 6-8 weeks    If no better then see ENT    If snoring is no better see sleep study    Try an elimination diet:    F Fermentable   O Oligosaccharides Fructans, galacto-oligosaccharides Wheat, barley, rye, onion, mulugeta, white part of spring onion, garlic, shallots, artichokes, beetroot, fennel, peas, chicory, pistachio, cashews, legumes, lentils, and chickpeas   D Disaccharides Lactose Milk, custard, ice cream, and yogurt   M Monosaccharides \"Free fructose\" (fructose in excess of glucose) Apples, pears, mangoes, cherries, watermelon, asparagus, sugar snap peas, honey, high-fructose corn syrup   A And   P Polyols Sorbitol, mannitol, maltitol, and xylitol Apples, pears, apricots, cherries, nectarines, peaches, plums, watermelon, mushrooms, cauliflower, artificially sweetened chewing gum and confectionery     Stop OCP and recheck CRP in 1 month  "

## 2025-02-20 DIAGNOSIS — F43.21 ADJUSTMENT DISORDER WITH DEPRESSED MOOD: ICD-10-CM

## 2025-02-24 RX ORDER — SERTRALINE HYDROCHLORIDE 100 MG/1
100 TABLET, FILM COATED ORAL DAILY
Qty: 90 TABLET | Refills: 3 | Status: SHIPPED | OUTPATIENT
Start: 2025-02-24

## 2025-03-02 ENCOUNTER — HEALTH MAINTENANCE LETTER (OUTPATIENT)
Age: 25
End: 2025-03-02

## 2025-03-06 ENCOUNTER — LAB (OUTPATIENT)
Dept: LAB | Facility: CLINIC | Age: 25
End: 2025-03-06
Payer: COMMERCIAL

## 2025-03-06 DIAGNOSIS — R19.5 LOOSE STOOLS: ICD-10-CM

## 2025-03-06 DIAGNOSIS — R79.82 ELEVATED C-REACTIVE PROTEIN (CRP): ICD-10-CM

## 2025-03-18 ENCOUNTER — MYC MEDICAL ADVICE (OUTPATIENT)
Dept: FAMILY MEDICINE | Facility: CLINIC | Age: 25
End: 2025-03-18
Payer: COMMERCIAL

## 2025-03-18 DIAGNOSIS — R79.82 ELEVATED C-REACTIVE PROTEIN (CRP): ICD-10-CM

## 2025-03-18 DIAGNOSIS — F32.81 PMDD (PREMENSTRUAL DYSPHORIC DISORDER): Primary | ICD-10-CM

## 2025-03-18 NOTE — TELEPHONE ENCOUNTER
Dr. Erwin,    Please see below Greetzhart message and advise.   Pt had updated CRP labs completed 3/06/25: CRP, inflammation (03/06/2025 11:49 AM)     Thanks,   Dilcia MACIAS RN

## 2025-03-20 RX ORDER — ACETAMINOPHEN AND CODEINE PHOSPHATE 120; 12 MG/5ML; MG/5ML
0.35 SOLUTION ORAL DAILY
Qty: 90 TABLET | Refills: 3 | Status: SHIPPED | OUTPATIENT
Start: 2025-03-20

## 2025-03-20 NOTE — TELEPHONE ENCOUNTER
Called and reviewed her labs    CRP ahs come down since holding the nuvaring  - has been off this for almost 2 montsh now    CRP Inflammation   Date Value Ref Range Status   03/06/2025 18.80 (H) <5.00 mg/L Final      She has not had ane JRA flares or eye flares in the ast 10 years    Last labs showed no other inflammatory markers elevated    She denies loose stools    Will try to recehck CRP again    Have her try progesterone only for period control in mean time    SN

## 2025-04-02 ENCOUNTER — PATIENT OUTREACH (OUTPATIENT)
Dept: CARE COORDINATION | Facility: CLINIC | Age: 25
End: 2025-04-02
Payer: COMMERCIAL

## 2025-04-03 ENCOUNTER — OFFICE VISIT (OUTPATIENT)
Dept: OPHTHALMOLOGY | Facility: CLINIC | Age: 25
End: 2025-04-03
Attending: OPHTHALMOLOGY
Payer: COMMERCIAL

## 2025-04-03 DIAGNOSIS — H20.11 CHRONIC ANTERIOR UVEITIS OF RIGHT EYE: Primary | ICD-10-CM

## 2025-04-03 DIAGNOSIS — H40.053 BORDERLINE GLAUCOMA OF BOTH EYES WITH OCULAR HYPERTENSION: ICD-10-CM

## 2025-04-03 PROCEDURE — 99213 OFFICE O/P EST LOW 20 MIN: CPT | Performed by: OPHTHALMOLOGY

## 2025-04-03 ASSESSMENT — VISUAL ACUITY
OD_CC: 20/20
CORRECTION_TYPE: GLASSES
METHOD: SNELLEN - LINEAR
OD_CC+: -1
OS_CC: 20/20

## 2025-04-03 ASSESSMENT — EXTERNAL EXAM - RIGHT EYE: OD_EXAM: NORMAL

## 2025-04-03 ASSESSMENT — REFRACTION_WEARINGRX
OD_SPHERE: -1.50
OD_AXIS: 070
OS_AXIS: 180
OD_CYLINDER: +1.00
OS_CYLINDER: +0.50
OS_SPHERE: -1.50
SPECS_TYPE: SVL

## 2025-04-03 ASSESSMENT — TONOMETRY
OS_IOP_MMHG: 18
OD_IOP_MMHG: 22
OS_IOP_MMHG: 19
IOP_METHOD: TONOPEN
IOP_METHOD: TONOPEN
OS_IOP_MMHG: 14
IOP_METHOD: APPLANATION
OD_IOP_MMHG: 21
OD_IOP_MMHG: 21

## 2025-04-03 ASSESSMENT — SLIT LAMP EXAM - LIDS
COMMENTS: NORMAL
COMMENTS: NORMAL

## 2025-04-03 ASSESSMENT — CUP TO DISC RATIO
OD_RATIO: 0.5
OS_RATIO: 0.5

## 2025-04-03 ASSESSMENT — EXTERNAL EXAM - LEFT EYE: OS_EXAM: NORMAL

## 2025-04-03 NOTE — NURSING NOTE
Chief Complaints and History of Present Illnesses   Patient presents with    Uveitis Follow-Up     . OMERO-associated anterior uveitis right eye, quiescent today on Predforte twice a day right eye (patient stopped Humira 1.5 months ago)     Chief Complaint(s) and History of Present Illness(es)       Uveitis Follow-Up              Laterality: right eye    Comments: . OMERO-associated anterior uveitis right eye, quiescent today on Predforte twice a day right eye (patient stopped Humira 1.5 months ago)              Comments    Patient states vision has been about the same since here last. No ocular pain. No headaches. Same floaters, nothing new seen. No flashes of light. No redness noticed. No extreme photosensitivity. Taking ocular medications as recommended. No DM.    Ginger Carty on 4/3/2025 at 10:20 AM

## 2025-04-03 NOTE — PROGRESS NOTES
HPI: Colette Marcos is a 24 year old female with a history of OMERO-associated anterior uveitis right eye here uveitis clinic follow up after stopping Humira 4 months ago. No eye symptoms. Vision is stable. No eye redness. No change in photophobia.    Current medications: prednisolone acetate two times a day right eye.   IMT previously prescribed by rheum at [facility].    Prior medications have included various oral NSAIDs for joints and a short course of oral prednisone for joints (patient did not tolerate well, felt sick per mother's report, patient does not recall because it was so long ago). For ease of use she switched to Humira from Remicade 700 mg (~9.5 mg/kg) q4wk (Fall 2013 - 5.7.18), oral methotrexate 10 mg (was on and off methotrexate since age 8-9 years, previously on 15 mg/wk with taper starting early Jan 2019 - stopped July 20, 2019), Humira q2-3wk (started 6.7.18 - 11.28.24).    Ocular history:   1. 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 Joint disease (RF-negative, polyarticular) first diagnosed at age 5 years (though mother reports that she may have been symptomatic as early as age 2 years with ankle pain and swelling). Follows with Dr. Arnold ([facility] rheum) for joint disease and immunomodulatory therapy. Uveitis first diagnosed at age 8-8 years. Patient and mother report no history of uveitis left eye. Review of electronic records here (through 01/2014) shows no cell left eye.    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   Most recently had 0.5+ anterior chamber cell right eye in August 2017 (low trace, ~1 cell/hpf, was holding MTX at time of exam) and 0.5+ anterior chamber cell right eye in March 2019 (0-2 cells/hpf) and June/Aug 2020 (1 cell/hpf).  2. History of cystoid macular edema right eye, s/p intravitreal corticosteroid right eye in September 2013 (Dr. Fofana).  3. Pseudophakia right eye s/p cataract extraction/IOL 12.09.14  ([facility]), s/p YAG capsulotomy 04.24.17.  4. Posterior vitreous detachment (PVD) right eye.    Ocular Imaging:  Optos fluorescein angiography 6.25.21: no leakage in either eye    Macular OCT 3.10.23: within normal limits and stable both eyes    Central corneal thickness 8.14.20: 571/569  RNFL OCT 11.22.24 within normal limits and stable vs 8.14.20 both eyes    OVF 24-2 3.10.23:  OD - reliable. Diffuse non-specific loss. MD -5.7 **Patient reports that target was difficult to see (bifocal correction was not done for this test - patient has had cataract surgery right eye only and requires bifocal add for visual field testing right eye).  OS - reliable. Normal. MD 0.6    OVF 24-2 7.14.23 right eye: reliable. No defects.    Impression/Plan:   1. OMERO-associated anterior uveitis right eye, quiescent today on Predforte twice a day right eye (patient stopped Humira 4 months ago).  - Continue off systemic medications    - Continue Predforte right eye twice a day    2. Ocular hypertension both eyes. Repeat retinal nerve fiber layer OCT 11/2024 was normal and stable. Visual field testing in 2023 shows no glaucomatous loss.   - Intraocular pressure stable at higher end of normal and symmetric despite Predforte only in right eye   - Monitor    3. Pseudophakia right eye, stable.   - Monitor    Follow up in 3 months, V/T, sooner with changes    Attending Physician Attestation:  Complete documentation of historical and exam elements from today's encounter can be found in the full encounter summary report (not reduplicated in this progress note).  I personally obtained the chief complaint(s) and history of present illness.  I confirmed and edited as necessary the review of systems, past medical/surgical history, family history, social history, and examination findings as documented by others; and I examined the patient myself.  I personally reviewed the relevant tests, images, and reports as documented above.  I formulated and edited  as necessary the assessment and plan and discussed the findings and management plan with the patient and family.  - Yaima Maynard M.D.

## 2025-04-30 ENCOUNTER — LAB (OUTPATIENT)
Dept: LAB | Facility: CLINIC | Age: 25
End: 2025-04-30
Payer: COMMERCIAL

## 2025-04-30 DIAGNOSIS — R79.82 ELEVATED C-REACTIVE PROTEIN (CRP): ICD-10-CM

## 2025-04-30 DIAGNOSIS — R73.03 PREDIABETES: ICD-10-CM

## 2025-04-30 LAB
CRP SERPL-MCNC: 16.1 MG/L
EST. AVERAGE GLUCOSE BLD GHB EST-MCNC: 123 MG/DL
HBA1C MFR BLD: 5.9 % (ref 0–5.6)

## 2025-04-30 PROCEDURE — 86140 C-REACTIVE PROTEIN: CPT

## 2025-04-30 PROCEDURE — 36415 COLL VENOUS BLD VENIPUNCTURE: CPT

## 2025-04-30 PROCEDURE — 83036 HEMOGLOBIN GLYCOSYLATED A1C: CPT

## 2025-05-09 ENCOUNTER — RESULTS FOLLOW-UP (OUTPATIENT)
Dept: FAMILY MEDICINE | Facility: CLINIC | Age: 25
End: 2025-05-09

## 2025-05-09 DIAGNOSIS — R73.03 PREDIABETES: ICD-10-CM

## 2025-05-09 RX ORDER — METFORMIN HYDROCHLORIDE 500 MG/1
500 TABLET, EXTENDED RELEASE ORAL 2 TIMES DAILY WITH MEALS
Qty: 180 TABLET | Refills: 3 | Status: SHIPPED | OUTPATIENT
Start: 2025-05-09 | End: 2025-06-10

## 2025-05-09 NOTE — RESULT ENCOUNTER NOTE
Hello,    The CRP is coming down, which is good. It is still elevated.This certainly could be due to the arthritis that you have. I'd like to recheck this in a few months. As you probably know this can sometimes be elevated in infection or inflammation. Let me know if you have any concerns for infection like a persistent, cough, sinus pain, urinary symptoms, etc.    The hemoglobin A1c is slowly going up.I'd like to have you take two metformin tablets daily you can take them at the same time with a meal or split them up twice daily with two meals. We can recheck the hemoglobin A1c in 3 to 6 months.    Yaima Jaime MD

## 2025-06-10 ENCOUNTER — MYC MEDICAL ADVICE (OUTPATIENT)
Dept: FAMILY MEDICINE | Facility: CLINIC | Age: 25
End: 2025-06-10
Payer: COMMERCIAL

## 2025-06-10 DIAGNOSIS — R09.81 NASAL CONGESTION: ICD-10-CM

## 2025-06-10 DIAGNOSIS — R73.03 PREDIABETES: ICD-10-CM

## 2025-06-10 DIAGNOSIS — F43.21 ADJUSTMENT DISORDER WITH DEPRESSED MOOD: ICD-10-CM

## 2025-06-10 DIAGNOSIS — F32.81 PMDD (PREMENSTRUAL DYSPHORIC DISORDER): ICD-10-CM

## 2025-06-10 RX ORDER — FLUTICASONE PROPIONATE 50 MCG
2 SPRAY, SUSPENSION (ML) NASAL DAILY
Qty: 48 G | Refills: 1 | Status: SHIPPED | OUTPATIENT
Start: 2025-06-10

## 2025-06-10 RX ORDER — SERTRALINE HYDROCHLORIDE 100 MG/1
100 TABLET, FILM COATED ORAL DAILY
Qty: 90 TABLET | Refills: 3 | OUTPATIENT
Start: 2025-06-10

## 2025-06-10 RX ORDER — ACETAMINOPHEN AND CODEINE PHOSPHATE 120; 12 MG/5ML; MG/5ML
0.35 SOLUTION ORAL DAILY
Qty: 90 TABLET | Refills: 0 | Status: SHIPPED | OUTPATIENT
Start: 2025-06-10

## 2025-06-10 RX ORDER — METFORMIN HYDROCHLORIDE 500 MG/1
500 TABLET, EXTENDED RELEASE ORAL 2 TIMES DAILY WITH MEALS
Qty: 180 TABLET | Refills: 0 | Status: SHIPPED | OUTPATIENT
Start: 2025-06-10

## 2025-06-10 NOTE — TELEPHONE ENCOUNTER
Pharmacy location change request. Med pended with updated pharmacy location.    Dilcia MACIAS RN

## 2025-07-03 ENCOUNTER — OFFICE VISIT (OUTPATIENT)
Dept: OPHTHALMOLOGY | Facility: CLINIC | Age: 25
End: 2025-07-03
Attending: OPHTHALMOLOGY
Payer: COMMERCIAL

## 2025-07-03 DIAGNOSIS — H20.11 CHRONIC ANTERIOR UVEITIS OF RIGHT EYE: Primary | ICD-10-CM

## 2025-07-03 DIAGNOSIS — H40.053 BORDERLINE GLAUCOMA OF BOTH EYES WITH OCULAR HYPERTENSION: ICD-10-CM

## 2025-07-03 ASSESSMENT — VISUAL ACUITY
CORRECTION_TYPE: GLASSES
METHOD: SNELLEN - LINEAR
OS_CC: 20/20
OD_CC: 20/25

## 2025-07-03 ASSESSMENT — TONOMETRY
OS_IOP_MMHG: 20
IOP_METHOD: ICARE
OD_IOP_MMHG: 20

## 2025-07-03 ASSESSMENT — REFRACTION_WEARINGRX
OD_AXIS: 070
OS_SPHERE: -1.50
SPECS_TYPE: SVL
OS_AXIS: 180
OS_CYLINDER: +0.50
OD_CYLINDER: +1.00
OD_SPHERE: -1.50

## 2025-07-03 ASSESSMENT — EXTERNAL EXAM - LEFT EYE: OS_EXAM: NORMAL

## 2025-07-03 ASSESSMENT — SLIT LAMP EXAM - LIDS
COMMENTS: NORMAL
COMMENTS: NORMAL

## 2025-07-03 ASSESSMENT — CUP TO DISC RATIO
OS_RATIO: 0.5
OD_RATIO: 0.45

## 2025-07-03 ASSESSMENT — EXTERNAL EXAM - RIGHT EYE: OD_EXAM: NORMAL

## 2025-07-03 NOTE — PROGRESS NOTES
HPI: Colette Marcos is a 25 year old female with a history of OMERO-associated anterior uveitis right eye here for uveitis clinic follow up. She stopped Humira 7 months ago. No eye symptoms. Vision is stable. No eye redness. No change in photophobia.    Current medications: prednisolone acetate two times a day right eye.   IMT previously prescribed by rheum at [facility].    Prior medications have included various oral NSAIDs for joints and a short course of oral prednisone for joints (patient did not tolerate well, felt sick per mother's report, patient does not recall because it was so long ago). For ease of use she switched to Humira from Remicade 700 mg (~9.5 mg/kg) q4wk (Fall 2013 - 5.7.18), oral methotrexate 10 mg (was on and off methotrexate since age 8-9 years, previously on 15 mg/wk with taper starting early Jan 2019 - stopped July 20, 2019), Humira q2-3wk (started 6.7.18 - 11.28.24).    Ocular history:   1. 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 Joint disease (RF-negative, polyarticular) first diagnosed at age 5 years (though mother reports that she may have been symptomatic as early as age 2 years with ankle pain and swelling). Follows with Dr. Arnlod ([facility] rheum) for joint disease and immunomodulatory therapy. Uveitis first diagnosed at age 8-9 years. Patient and mother report no history of uveitis left eye. Review of electronic records here (through 01/2014) shows no cell left eye.    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   Most recently had 0.5+ anterior chamber cell right eye in August 2017 (low trace, ~1 cell/hpf, was holding MTX at time of exam) and 0.5+ anterior chamber cell right eye in March 2019 (0-2 cells/hpf) and June/Aug 2020 (1 cell/hpf).  2. History of cystoid macular edema right eye, s/p intravitreal corticosteroid right eye in September 2013 (Dr. Fofana).  3. Pseudophakia right eye s/p cataract extraction/IOL 12.09.14  ([facility]), s/p YAG capsulotomy 04.24.17.  4. Posterior vitreous detachment (PVD) right eye.    Ocular Imaging:  Optos fluorescein angiography 6.25.21: no leakage in either eye    Macular OCT 3.10.23: within normal limits and stable both eyes    Central corneal thickness 8.14.20: 571/569  RNFL OCT 11.22.24 within normal limits and stable vs 8.14.20 both eyes    OVF 24-2 3.10.23:  OD - reliable. Diffuse non-specific loss. MD -5.7 **Patient reports that target was difficult to see (bifocal correction was not done for this test - patient has had cataract surgery right eye only and requires bifocal add for visual field testing right eye).  OS - reliable. Normal. MD 0.6    OVF 24-2 7.14.23 right eye: reliable. No defects.    Impression/Plan:   1. OMERO-associated anterior uveitis right eye, quiescent today on Predforte twice a day right eye (patient stopped Humira 7 months ago).  - Continue off systemic medications    - Continue Predforte right eye twice a day    2. Ocular hypertension both eyes. Repeat retinal nerve fiber layer OCT 11/2024 was normal and stable. Visual field testing in 2023 showed no glaucomatous loss.   - Intraocular pressure stable at higher end of normal and symmetric despite Predforte only in right eye   - Monitor    3. Pseudophakia right eye, stable.   - Monitor    Follow up in 4 months, V/T, sooner with changes    Attending Physician Attestation:  Complete documentation of historical and exam elements from today's encounter can be found in the full encounter summary report (not reduplicated in this progress note).  I personally obtained the chief complaint(s) and history of present illness.  I confirmed and edited as necessary the review of systems, past medical/surgical history, family history, social history, and examination findings as documented by others; and I examined the patient myself.  I personally reviewed the relevant tests, images, and reports as documented above.  I formulated and edited  as necessary the assessment and plan and discussed the findings and management plan with the patient and family.  - Yaima Maynard M.D.

## 2025-07-03 NOTE — NURSING NOTE
Chief Complaints and History of Present Illnesses   Patient presents with    Uveitis Follow-Up     3 Month Uveitis Follow-Up     Chief Complaint(s) and History of Present Illness(es)       Uveitis Follow-Up              Laterality: both eyes    Severity: mild    Frequency: constantly    Course: stable    Associated symptoms: Negative for eye pain, headache, flashes and floaters    Treatments tried: eye drops    Comments: 3 Month Uveitis Follow-Up              Comments    Current ocular drops   Prednisolone BID right eye     Julisa APPIAH 9:11 AM July 3, 2025

## 2025-07-13 ENCOUNTER — MYC REFILL (OUTPATIENT)
Dept: FAMILY MEDICINE | Facility: CLINIC | Age: 25
End: 2025-07-13
Payer: COMMERCIAL

## 2025-07-13 DIAGNOSIS — F32.81 PMDD (PREMENSTRUAL DYSPHORIC DISORDER): ICD-10-CM

## 2025-07-14 RX ORDER — ACETAMINOPHEN AND CODEINE PHOSPHATE 120; 12 MG/5ML; MG/5ML
0.35 SOLUTION ORAL DAILY
Qty: 90 TABLET | Refills: 0 | OUTPATIENT
Start: 2025-07-14